# Patient Record
Sex: MALE | Race: WHITE | NOT HISPANIC OR LATINO | Employment: OTHER | URBAN - METROPOLITAN AREA
[De-identification: names, ages, dates, MRNs, and addresses within clinical notes are randomized per-mention and may not be internally consistent; named-entity substitution may affect disease eponyms.]

---

## 2017-04-15 ENCOUNTER — HOSPITAL ENCOUNTER (EMERGENCY)
Facility: CLINIC | Age: 62
Discharge: HOME | End: 2017-04-15
Attending: EMERGENCY MEDICINE | Admitting: SURGERY
Payer: COMMERCIAL

## 2017-04-15 VITALS
SYSTOLIC BLOOD PRESSURE: 145 MMHG | OXYGEN SATURATION: 99 % | RESPIRATION RATE: 16 BRPM | TEMPERATURE: 97.8 F | DIASTOLIC BLOOD PRESSURE: 84 MMHG | HEART RATE: 87 BPM

## 2017-04-15 DIAGNOSIS — S31.139A PUNCTURE WOUND OF ABDOMEN, INITIAL ENCOUNTER: ICD-10-CM

## 2017-04-15 PROCEDURE — 25000128 H RX IP 250 OP 636

## 2017-04-15 PROCEDURE — 20102 EXPL PENTRG WND ABD/FLNK/BK: CPT | Performed by: SURGERY

## 2017-04-15 PROCEDURE — 99284 EMERGENCY DEPT VISIT MOD MDM: CPT | Mod: 25

## 2017-04-15 PROCEDURE — 36000050 ZZH SURGERY LEVEL 2 1ST 30 MIN: Performed by: SURGERY

## 2017-04-15 PROCEDURE — 25000125 ZZHC RX 250: Performed by: SURGERY

## 2017-04-15 PROCEDURE — 71000027 ZZH RECOVERY PHASE 2 EACH 15 MINS: Performed by: SURGERY

## 2017-04-15 PROCEDURE — 27210794 ZZH OR GENERAL SUPPLY STERILE: Performed by: SURGERY

## 2017-04-15 PROCEDURE — 99203 OFFICE O/P NEW LOW 30 MIN: CPT | Mod: 57 | Performed by: SURGERY

## 2017-04-15 PROCEDURE — 40000306 ZZH STATISTIC PRE PROC ASSESS II: Performed by: SURGERY

## 2017-04-15 RX ORDER — GLYCINE 1.5 G/100ML
SOLUTION IRRIGATION PRN
Status: DISCONTINUED | OUTPATIENT
Start: 2017-04-15 | End: 2017-04-15 | Stop reason: HOSPADM

## 2017-04-15 RX ORDER — OXYCODONE HYDROCHLORIDE 5 MG/1
5-10 TABLET ORAL
Qty: 20 TABLET | Refills: 0 | Status: SHIPPED | OUTPATIENT
Start: 2017-04-15 | End: 2018-02-14

## 2017-04-15 RX ORDER — OXYCODONE HYDROCHLORIDE 5 MG/1
5-10 TABLET ORAL
Status: DISCONTINUED | OUTPATIENT
Start: 2017-04-15 | End: 2017-04-15 | Stop reason: HOSPADM

## 2017-04-15 RX ORDER — CEFAZOLIN SODIUM 1 G/3ML
INJECTION, POWDER, FOR SOLUTION INTRAMUSCULAR; INTRAVENOUS
Status: COMPLETED
Start: 2017-04-15 | End: 2017-04-15

## 2017-04-15 RX ORDER — BUPIVACAINE HYDROCHLORIDE 5 MG/ML
INJECTION, SOLUTION PERINEURAL
Status: DISCONTINUED
Start: 2017-04-15 | End: 2017-04-15 | Stop reason: HOSPADM

## 2017-04-15 RX ADMIN — CEFAZOLIN SODIUM 1 G: 1 INJECTION, POWDER, FOR SOLUTION INTRAMUSCULAR; INTRAVENOUS at 15:03

## 2017-04-15 ASSESSMENT — ENCOUNTER SYMPTOMS: WOUND: 1
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
## 2017-04-15 NOTE — IP AVS SNAPSHOT
[facility] PreOP/PostOP    [address]    [facility] [address]    Phone:  [phone]    Fax:  [phone]                                       After Visit Summary   4/15/2017    Linda Grover    MRN: [id number]           After Visit Summary Signature Page     I have received my discharge instructions, and my questions have been answered. I have discussed any challenges I see with this plan with the nurse or doctor.    ..........................................................................................................................................  Patient/Patient Representative Signature      ..........................................................................................................................................  Patient Representative Print Name and Relationship to Patient    ..................................................               ................................................  Date                                            Time    ..........................................................................................................................................  Reviewed by Signature/Title    ...................................................              ..............................................  Date                                                            Time

## 2017-04-15 NOTE — IP AVS SNAPSHOT
"                  MRN:[id number]                      After Visit Summary   4/15/2017    Linda Grover    MRN: [id number]           Thank you!     Thank you for choosing [facility] for your care. Our goal is always to provide you with excellent care. Hearing back from our patients is one way we can continue to improve our services. Please take a few minutes to complete the written survey that you may receive in the mail after you visit. If you would like to speak to someone directly about your visit please contact Patient Relations at [phone]. Thank you!          Patient Information     Date Of Birth          1955        About your hospital stay     You were admitted on:  N/A You last received care in the:  [facility] PreOP/PostOP    You were discharged on:  April 15, 2017       Who to Call     For medical emergencies, please call 911.  For non-urgent questions about your medical care, please call your primary care provider or clinic, [phone]  For questions related to your surgery, please call your surgery clinic        Attending Provider     Provider Matthew Bhandari MD Emergency Medicine       Primary Care Provider Office Phone # Fax #    Marialuisa Beavers [phone]       [facility] [address] [facility] [place] [address]        After Care Instructions     Patient care order       Dressing supplies - 4x4 gauze, one stack                                   2\" medipore tape one roll                  Further instructions from your care team       HOME CARE FOLLOWING MINOR SURGERY  FARZANA Razo E. Gavin, N. Guttormson, D. Maurer, R. O Donnell, L. Thomas    RESULTS:  If a biopsy of tissue was done, you may call for your final pathology report after 1p.m. two working days after surgery.  Your results will also be reviewed with you at your postoperative appointment.    INCISIONAL CARE:    If you have a dressing in place, " keep clean and dry for 24 hours; you may replace the gauze if it becomes soiled.    Sutures which are beneath the skin will absorb and do not need to be removed.    Sutures you can see should be removed at your surgeon's office in 10-14 days at the latest.    You may expect a small amount of drainage from your incision.    A lump/ridge under the incision is normal and will gradually resolve.  If it becomes red or very uncomfortable, contact the nurse at your surgeon's office to discuss whether this needs to be evaluated.    ACTIVITY:  Cautiously resume exercise and strenuous activities such as jogging, tennis, aerobics, etc. Also, be careful of stretching activities which affect the area of surgery for two weeks.    DIET:  No restrictions.  Increased fluid intake is recommended. While taking pain medications, increase dietary fiber or add a fiber supplementation like Metamucil or Citrucel to help prevent constipation - a possible side effect of pain medications.    DISCOMFORT:  Local anesthetic placed at surgery should provide relief for 4-8 hours.  Begin taking pain pills before discomfort is severe.  Take the pain medication with some food, when possible, to minimize side effects.  Intermittent use of ice packs may help during the first 48 hours.  Expect gradual improvement.  You may slowly transition to use of over-the-counter medications for pain relief when you are no longer requiring narcotics for pain control.    RETURN APPOINTMENT:  Schedule a follow-up visit 2-3 weeks post-op.  Office Phone:  [phone]     CONTACT US IF THE FOLLOWING DEVELOPS:   1. A fever that is above 101     2. If there is a large amount of drainage, bleeding, or swelling.   3. Severe pain that is not relieved by your prescription.   4. Drainage that is thick, cloudy, yellow, green or white.   5. Any other questions not answered by  Frequently Asked Questions  sheet.      FREQUENTLY ASKED QUESTIONS:    Q:  How should my incision look?     A:  Normally your incision will appear slightly swollen with light redness directly along the incision itself as it heals.  It may feel like a bump or ridge as the healing/scarring happens, and over time (3-4 months) this bump or ridge feeling should slowly go away.  In general, clear or pink watery drainage can be normal at first as your incision heals, but should decrease over time.    Q:  How do I know if my incision is infected?  A:  Look at your incision for signs of infection, like redness around the incision spreading to surrounding skin, or drainage of cloudy or foul-smelling drainage.  If you feel warm, check your temperature to see if you are running a fever.    **If any of these things occur, please notify the nurse at our office.  We may need you to come into the office for an incision check.      Q:  How do I take care of my incision?  A:  If you have a dressing in place - Starting the day after surgery, replace the dressing 1-2 times a day until there is no further drainage from the incision.  At that time, a dressing is no longer needed.  Try to minimize tape on the skin if irritation is occurring at the tape sites.  If you have significant irritation from tape on the skin, please call the office to discuss other method of dressing your incision.      Q:  There is a piece of tape or a sticky  lead  still on my skin.  Can I remove this?  A:  Sometimes the sticky  leads  used for monitoring during surgery or for evaluation in the emergency department are not all removed while you are in the hospital.  These sometimes have a tab or metal dot on them.  You can easily remove these on your own, like taking off a band-aid.  If there is a gel substance under the  lead , simply wipe/clean it off with a washcloth or paper towel.      Q:  What can I do to minimize constipation (very hard stools, or lack of stools)?  A:  Stay well hydrated.  Increase your dietary fiber intake or take a fiber supplement -with  plenty of water.  Walk around frequently.  You may consider an over-the-counter stool-softener.  Your Pharmacist can assist you with choosing one that is stocked at your pharmacy.  Constipation is also one of the most common side effects of pain medication.  If you are using pain medication, be pro-active and try to PREVENT problems with constipation by taking the steps above BEFORE constipation becomes a problem.    Q:  What do I do if I need more pain medications?  A:  Call the office to receive refills.  Be aware that certain pain meds cannot be called into a pharmacy and actually require a paper prescription.  A change may be made in your pain med as you progress thru your recovery period or if you have side effects to certain meds.    --Pain meds are NOT refilled after 5pm on weekdays, and NOT AT ALL on the weekends, so please look ahead to prevent problems.      Q:  Why am I having a hard time sleeping now that I am at home?  A:  Many medications you receive while you are in the hospital can impact your sleep for a number of days after your surgery/hospitalization.  Decreased level of activity and naps during the day may also make sleeping at night difficult.  Try to minimize day-time naps, and get up frequently during the day to walk around your home during your recovery time.  Sleep aides may be of some help, but are not recommended for long-term use.      Q:  I am having some back discomfort.  What should I do?  A:  This may be related to certain positioning that was required for your surgery, extended periods of time in bed, or other changes in your overall activity level.  You may try ice, heat, acetaminophen, or ibuprofen to treat this temporarily.  Note that many pain medications have acetaminophen in them and would state this on the prescription bottle.  Be sure not to exceed the maximum of 4000mg per day of acetaminophen.     **If the pain you are having does not resolve, is severe, or is a flare of  back pain you have had on other occasions prior to surgery, please contact your primary physician for further recommendations or for an appointment to be examined at their office.    Q:  Why am I having headaches?  A:  Headaches can be caused by many things:  caffeine withdrawal, use of pain meds, dehydration, high blood pressure, lack of sleep, over-activity/exhaustion, flare-up of usual migraine headaches.  If you feel this is related to muscle tension (a band-like feeling around the head, or a pressure at the low-back of the head) you may try ice or heat to this area.  You may need to drink more fluids (try electrolyte drink like Gatorade), rest, or take your usual migraine medications.   **If your headaches do not resolve, worsen, are accompanied by other symptoms, or if your blood pressure is high, please call your primary physician for recommendation and/or examination.    Q:  I am unable to urinate.  What do I do?  A:  A small percentage of people can have difficulty urinating initially after surgery.  This includes being able to urinate only a very small amount at a time and feeling discomfort or pressure in the very low abdomen.  This is called  urinary retention , and is actually an urgent situation.  Proceed to your nearest Emergency department for evaluation (not an Urgent Care Center).  Sometimes the bladder does not work correctly after certain medications you receive during surgery, or related to certain procedures.  You may need to have a catheter placed until your bladder recovers.  When planning to go to an Emergency department, it may help to call the ER to let them know you are coming in for this problem after a surgery.  This may help you get in quicker to be evaluated.  **If you have symptoms of a urinary tract infection, please contact your primary physician for the proper evaluation and treatment.          If you have other questions, please call the office Monday thru Friday between 8am and  "5pm to discuss with the nurse or physician assistant.  [phone]    There is a surgeon ON CALL on weekday evenings and over the weekend in case of urgent need only, and may be contacted at the same number.    If you are having an emergency, call 911 or proceed to your nearest emergency department.        Pending Results     No orders found from 2017 to 2017.            Admission Information     Date & Time Department Dept. Phone    4/15/2017 [facility] PreOP/PostOP [phone]      Your Vitals Were     Blood Pressure Pulse Temperature Respirations Pulse Oximetry       152/94 87 97.8  F (36.6  C) (Temporal) 16 98%       MyChart Information     [employer] lets you send messages to your doctor, view your test results, renew your prescriptions, schedule appointments and more. To sign up, go to [email or web address] . Click on \"Log in\" on the left side of the screen, which will take you to the Welcome page. Then click on \"Sign up Now\" on the right side of the page.     You will be asked to enter the access code listed below, as well as some personal information. Please follow the directions to create your username and password.     Your access code is: [email or web address]  Expires: 2017  4:38 PM     Your access code will  in 90 days. If you need help or a new code, please call your [place] clinic or [phone].        Care EveryWhere ID     This is your Care EveryWhere ID. This could be used by other organizations to access your [place] medical records  [id number]           Review of your medicines      START taking        Dose / Directions    oxyCODONE 5 MG IR tablet   Commonly known as:  ROXICODONE   Used for:  Puncture wound of abdomen, initial encounter        Dose:  5-10 mg   Take 1-2 tablets (5-10 mg) by mouth every 3 hours as needed for pain or other (Moderate to Severe)   Quantity:  20 tablet   Refills:  0         CONTINUE these medicines which have NOT CHANGED        Dose / " Directions    BACLOFEN PO        Refills:  0       LISINOPRIL PO        Refills:  0       TYSABRI IV        Refills:  0            Where to get your medicines      Some of these will need a paper prescription and others can be bought over the counter. Ask your nurse if you have questions.     Bring a paper prescription for each of these medications     oxyCODONE 5 MG IR tablet                Protect others around you: Learn how to safely use, store and throw away your medicines at www.disposemymeds.org.             Medication List: This is a list of all your medications and when to take them. Check marks below indicate your daily home schedule. Keep this list as a reference.      Medications           Morning Afternoon Evening Bedtime As Needed    BACLOFEN PO                                LISINOPRIL PO                                oxyCODONE 5 MG IR tablet   Commonly known as:  ROXICODONE   Take 1-2 tablets (5-10 mg) by mouth every 3 hours as needed for pain or other (Moderate to Severe)                                TYSABRI IV

## 2017-04-15 NOTE — DISCHARGE INSTRUCTIONS
HOME CARE FOLLOWING MINOR SURGERY  FARZANA Razo E. Gavin, N. Guttormson, D. Maurer, LIBBY Pappas    RESULTS:  If a biopsy of tissue was done, you may call for your final pathology report after 1p.m. two working days after surgery.  Your results will also be reviewed with you at your postoperative appointment.    INCISIONAL CARE:    If you have a dressing in place, keep clean and dry for 24 hours; you may replace the gauze if it becomes soiled.    Sutures which are beneath the skin will absorb and do not need to be removed.    Sutures you can see should be removed at your surgeon's office in 10-14 days at the latest.    You may expect a small amount of drainage from your incision.    A lump/ridge under the incision is normal and will gradually resolve.  If it becomes red or very uncomfortable, contact the nurse at your surgeon's office to discuss whether this needs to be evaluated.    ACTIVITY:  Cautiously resume exercise and strenuous activities such as jogging, tennis, aerobics, etc. Also, be careful of stretching activities which affect the area of surgery for two weeks.    DIET:  No restrictions.  Increased fluid intake is recommended. While taking pain medications, increase dietary fiber or add a fiber supplementation like Metamucil or Citrucel to help prevent constipation - a possible side effect of pain medications.    DISCOMFORT:  Local anesthetic placed at surgery should provide relief for 4-8 hours.  Begin taking pain pills before discomfort is severe.  Take the pain medication with some food, when possible, to minimize side effects.  Intermittent use of ice packs may help during the first 48 hours.  Expect gradual improvement.  You may slowly transition to use of over-the-counter medications for pain relief when you are no longer requiring narcotics for pain control.    RETURN APPOINTMENT:  Schedule a follow-up visit 2-3 weeks post-op.  Office Phone:  [phone]     CONTACT US  IF THE FOLLOWING DEVELOPS:   1. A fever that is above 101     2. If there is a large amount of drainage, bleeding, or swelling.   3. Severe pain that is not relieved by your prescription.   4. Drainage that is thick, cloudy, yellow, green or white.   5. Any other questions not answered by  Frequently Asked Questions  sheet.      FREQUENTLY ASKED QUESTIONS:    Q:  How should my incision look?    A:  Normally your incision will appear slightly swollen with light redness directly along the incision itself as it heals.  It may feel like a bump or ridge as the healing/scarring happens, and over time (3-4 months) this bump or ridge feeling should slowly go away.  In general, clear or pink watery drainage can be normal at first as your incision heals, but should decrease over time.    Q:  How do I know if my incision is infected?  A:  Look at your incision for signs of infection, like redness around the incision spreading to surrounding skin, or drainage of cloudy or foul-smelling drainage.  If you feel warm, check your temperature to see if you are running a fever.    **If any of these things occur, please notify the nurse at our office.  We may need you to come into the office for an incision check.      Q:  How do I take care of my incision?  A:  If you have a dressing in place - Starting the day after surgery, replace the dressing 1-2 times a day until there is no further drainage from the incision.  At that time, a dressing is no longer needed.  Try to minimize tape on the skin if irritation is occurring at the tape sites.  If you have significant irritation from tape on the skin, please call the office to discuss other method of dressing your incision.      Q:  There is a piece of tape or a sticky  lead  still on my skin.  Can I remove this?  A:  Sometimes the sticky  leads  used for monitoring during surgery or for evaluation in the emergency department are not all removed while you are in the hospital.  These  sometimes have a tab or metal dot on them.  You can easily remove these on your own, like taking off a band-aid.  If there is a gel substance under the  lead , simply wipe/clean it off with a washcloth or paper towel.      Q:  What can I do to minimize constipation (very hard stools, or lack of stools)?  A:  Stay well hydrated.  Increase your dietary fiber intake or take a fiber supplement -with plenty of water.  Walk around frequently.  You may consider an over-the-counter stool-softener.  Your Pharmacist can assist you with choosing one that is stocked at your pharmacy.  Constipation is also one of the most common side effects of pain medication.  If you are using pain medication, be pro-active and try to PREVENT problems with constipation by taking the steps above BEFORE constipation becomes a problem.    Q:  What do I do if I need more pain medications?  A:  Call the office to receive refills.  Be aware that certain pain meds cannot be called into a pharmacy and actually require a paper prescription.  A change may be made in your pain med as you progress thru your recovery period or if you have side effects to certain meds.    --Pain meds are NOT refilled after 5pm on weekdays, and NOT AT ALL on the weekends, so please look ahead to prevent problems.      Q:  Why am I having a hard time sleeping now that I am at home?  A:  Many medications you receive while you are in the hospital can impact your sleep for a number of days after your surgery/hospitalization.  Decreased level of activity and naps during the day may also make sleeping at night difficult.  Try to minimize day-time naps, and get up frequently during the day to walk around your home during your recovery time.  Sleep aides may be of some help, but are not recommended for long-term use.      Q:  I am having some back discomfort.  What should I do?  A:  This may be related to certain positioning that was required for your surgery, extended periods of  time in bed, or other changes in your overall activity level.  You may try ice, heat, acetaminophen, or ibuprofen to treat this temporarily.  Note that many pain medications have acetaminophen in them and would state this on the prescription bottle.  Be sure not to exceed the maximum of 4000mg per day of acetaminophen.     **If the pain you are having does not resolve, is severe, or is a flare of back pain you have had on other occasions prior to surgery, please contact your primary physician for further recommendations or for an appointment to be examined at their office.    Q:  Why am I having headaches?  A:  Headaches can be caused by many things:  caffeine withdrawal, use of pain meds, dehydration, high blood pressure, lack of sleep, over-activity/exhaustion, flare-up of usual migraine headaches.  If you feel this is related to muscle tension (a band-like feeling around the head, or a pressure at the low-back of the head) you may try ice or heat to this area.  You may need to drink more fluids (try electrolyte drink like Gatorade), rest, or take your usual migraine medications.   **If your headaches do not resolve, worsen, are accompanied by other symptoms, or if your blood pressure is high, please call your primary physician for recommendation and/or examination.    Q:  I am unable to urinate.  What do I do?  A:  A small percentage of people can have difficulty urinating initially after surgery.  This includes being able to urinate only a very small amount at a time and feeling discomfort or pressure in the very low abdomen.  This is called  urinary retention , and is actually an urgent situation.  Proceed to your nearest Emergency department for evaluation (not an Urgent Care Center).  Sometimes the bladder does not work correctly after certain medications you receive during surgery, or related to certain procedures.  You may need to have a catheter placed until your bladder recovers.  When planning to go to  an Emergency department, it may help to call the ER to let them know you are coming in for this problem after a surgery.  This may help you get in quicker to be evaluated.  **If you have symptoms of a urinary tract infection, please contact your primary physician for the proper evaluation and treatment.          If you have other questions, please call the office Monday thru Friday between 8am and 5pm to discuss with the nurse or physician assistant.  #[phone]    There is a surgeon ON CALL on weekday evenings and over the weekend in case of urgent need only, and may be contacted at the same number.    If you are having an emergency, call 911 or proceed to your nearest emergency department.

## 2017-04-16 NOTE — OP NOTE
DATE OF PROCEDURE:  04/15/2017      PREOPERATIVE DIAGNOSIS:  Stab wound, left upper abdomen.      POSTOPERATIVE DIAGNOSIS:  Stab wound, left upper abdomen, no penetration of fascia.      PROCEDURE:  Wound exploration, irrigation and partial closure.      SURGEON:  Ever Keane MD      ANESTHETIC:  Local.      BLOOD LOSS:  Minimal.      INDICATION:  Linda Grover is a 61-year-old male who was cutting a piece of garden irrigation tubing with a utility knife when he slipped and accidentally stabbed himself in the abdomen.  The knife went through a shirt.  The triangular blade was less than an inch in length by his report.  No portion of the plate appeared to be missing after the accident.  He did note significant initial bleeding which stopped on its own with local pressure.  He has no other symptoms and no other injuries.  Local exploration in the emergency room was inadequate to visualize the base of the wound.  He is now therefore brought to the operating room for improved retraction, exposure and lighting to explore the wound and proceed with laparotomy if necessary.  He seems to understand all these issues quite well.  All his questions have been answered and he is ready to proceed.      DESCRIPTION OF PROCEDURE:  The patient was brought to the operating room, placed supine on the table and the central abdomen was prepped and draped in sterile manner.  A pause was performed.  The previous wound was further anesthetized with local anesthetic.  We then explored the wound.  It travels tangentially caudally making visualization of the very base difficult.  We therefore extended the incision by about 1 cm and this allowed full visualization of the base of the wound.  There is no fascial penetration.  The wound is irrigated with IrriSept and rinsed.  We carefully checked for any debris or shirt fibers and none were seen.  After irrigating and rinsing, we loosely coapted the skin to allow for drainage between the  sutures, but to also secure the skin flap in place so it would heal in an appropriate position.  Once the sutures were placed, and dressing was applied and he was returned to the recovery room in excellent condition with all sponge and needle counts correct, having tolerated the procedure very well.         BOSTON CHATTERJEE MD             D: 04/15/2017 16:38   T: 04/15/2017 22:18   MT: [id number]      Name:     SAMPSON OROZCO   MRN:      [id number]        Account:        [id number]   :      1955           Procedure Date: 04/15/2017      Document: [id number]       cc: Marialuisa Beavers MD

## 2017-04-24 ENCOUNTER — HOSPITAL ENCOUNTER (EMERGENCY)
Facility: CLINIC | Age: 62
Discharge: HOME | End: 2017-04-24
Attending: NURSE PRACTITIONER | Admitting: NURSE PRACTITIONER
Payer: COMMERCIAL

## 2017-04-24 VITALS
DIASTOLIC BLOOD PRESSURE: 99 MMHG | SYSTOLIC BLOOD PRESSURE: 172 MMHG | OXYGEN SATURATION: 96 % | HEART RATE: 93 BPM | RESPIRATION RATE: 18 BRPM | TEMPERATURE: 98.1 F

## 2017-04-24 DIAGNOSIS — L25.9 CONTACT DERMATITIS, UNSPECIFIED CONTACT DERMATITIS TYPE, UNSPECIFIED TRIGGER: ICD-10-CM

## 2017-04-24 DIAGNOSIS — Z51.89 VISIT FOR WOUND CHECK: ICD-10-CM

## 2017-04-24 PROCEDURE — 99282 EMERGENCY DEPT VISIT SF MDM: CPT

## 2017-04-24 ASSESSMENT — ENCOUNTER SYMPTOMS
VOMITING: 0
COLOR CHANGE: 0
FEVER: 0
NAUSEA: 0
WOUND: 1

## 2017-04-24 NOTE — ED AVS SNAPSHOT
[facility] Emergency Department    [address]    [facility] [address]    Phone:  [phone]    Fax:  [phone]                                       Linda Grover   MRN: [id number]    Department:  [facility] Emergency Department   Date of Visit:  4/24/2017           After Visit Summary Signature Page     I have received my discharge instructions, and my questions have been answered. I have discussed any challenges I see with this plan with the nurse or doctor.    ..........................................................................................................................................  Patient/Patient Representative Signature      ..........................................................................................................................................  Patient Representative Print Name and Relationship to Patient    ..................................................               ................................................  Date                                            Time    ..........................................................................................................................................  Reviewed by Signature/Title    ...................................................              ..............................................  Date                                                            Time

## 2017-04-24 NOTE — DISCHARGE INSTRUCTIONS
Wound Care    You have a break in the skin. This wound may be because of an injury. Or it may be the result of surgery. Closing the wound helps stop bleeding, protects the wound from infection, and speeds healing. The type of closure that is used depends on the size and location of the wound. Choices include stitches (sutures), strips of surgical tape, skin glue, or staples.  Home care  Your health care provider may prescribe medicines for pain. Or he or she may suggest an over-the-counter (OTC) pain reliever, such as ibuprofen. If you have chronic kidney disease, talk with your provider before taking any OTC medicines. Also talk with your provider if you've had a stomach ulcer or gastrointestinal bleeding. In certain cases, antibiotics may be prescribed to help prevent infection. If antibiotics are prescribed, take them exactly as directed for as long as directed. Do not stop taking your antibiotics until they are all gone, even if you feel better.  General care    Follow the health care provider s instructions on how to care for the wound.    Wash your hands with soap and warm water before and after caring for the wound. This helps prevent infection.    If a bandage was applied, change it once a day or as directed. If at any time the bandage becomes wet or dirty, replace it with a new one.    Unless told otherwise, avoid soaking the wound in water. Take showers or sponge baths instead of tub baths. Don't scrub or pick at the wound.    Don't go swimming.    If you have a bandage and it gets wet, use a clean cloth to gently pat the wound dry. Then replace the bandage with a dry one.    Don't scratch, rub, or pick at the area.    Watch for the signs of infection listed below. Any wound can get infected, even if you are taking antibiotics. Seek care right away if you see any possible signs of infection.  Care for specific closures    Sutures. You may want to clean the wound daily after the first 2 to 3 days. To do  this, remove the bandage and gently wash the area with soap and warm water. After cleaning, apply a thin layer of antibiotic ointment if recommended. Then apply a new bandage. Sutures on the outside of the skin usually need to be removed by your health care provider.    Surgical tape. Keep the area dry. If it gets wet, blot it dry with a towel. Surgical tape closures usually fall off within 7 to 10 days. If they have not fallen off after 10 days, you can remove them yourself. To remove the tape, use mineral oil or petroleum jelly on a cotton ball to gently rub the adhesive.    Skin glue. You may shower or bathe as usual, but do not use soaps, lotions, or ointments on the wound area. Do not scrub the wound. After bathing, pat the wound dry with a soft towel. Do not apply liquids like peroxide, ointments, or creams to the wound while the strips or film is in place. Don't scratch, rub, or pick at the strips or film. Don't put tape directly over the strips or film. Skin adhesive film will fall off naturally in 5 to 10 days. If it does not peel off in 10 days, gently rub petroleum jelly or an ointment onto the film.    Jarek. Take showers or sponge baths. Don't take tub baths. Don't use lotions on the wound area. The area may be cleaned with soap and water 2 to 3 days after the wound was stapled. Don't scrub the wound. Pat it dry with a clean soft cloth or towel. You can use antibiotic ointment if your provider tells you to. Staples will need to be removed by your health care provider in 10 to 14 days.  Follow-up care  Follow up with your health care provider, or as directed. If you have sutures or staples, return for their removal as directed.  When to seek medical advice  Call your health care provider right away if you have signs of infection:    Fever of 100.4 F (38 C) or higher, or as directed by your health care provider    Increasing pain in the wound    Increasing redness or swelling    Pus or bad-smelling  drainage from the wound  Also call your provider right away if any of these occur:    Wound bleeds more than a small amount or won t stop bleeding    Wound edges come apart    Numbness or weakness in the wound area that doesn t go away    0927-5253 [employer]. [address] [place] [id number]. All rights reserved. This information is not intended as a substitute for professional medical care. Always follow your healthcare professional's instructions.

## 2017-04-24 NOTE — ED NOTES
Presents to the ED for suture removal of laceration to abdomen. Reports has had some redness around site and a possible tape reaction.

## 2017-04-24 NOTE — ED PROVIDER NOTES
History     Chief Complaint:  Wound Check and Suture Removal       HPI   Linda Grover is a 61 year old male who presents for a wound check and requesting suture removal. On 4/15/2017, the patient was seen in the ED for a puncture wound to his abdomen that he sustained after accidentally stabbing himself with a  that required surgical exploration and closure. He was advised to return in 10 days to have the sutures removed. Since then, the patient reports that the wound has been healing well and he has not had any fever, nausea, vomiting, or spreading redness around the wound. He presents to the ED due to concern that he will require suture removal.     Allergies:  NKDA     Medications:    Natalizumab (TYSABRI IV)  oxyCODONE (ROXICODONE) 5 MG IR tablet  BACLOFEN PO  LISINOPRIL PO    Past Medical History:    Hypertension  Multiple sclerosis     Past Surgical History:    Close secondary wound abdomen     Family History:    History reviewed. No pertinent family history.     Social History:  Tobacco use:    Former smoker  Alcohol use:    Positive  Marital status:    Marred  Accompanied to ED by:  Alone      Review of Systems   Constitutional: Negative for fever.   Gastrointestinal: Negative for nausea and vomiting.   Skin: Positive for wound (healing wound, abdomen). Negative for color change.   All other systems reviewed and are negative.    Physical Exam   First Vitals:  BP: (!) 172/99  Pulse: 93  Temp: 98.1  F (36.7  C)  Resp: 18  SpO2: 96 %      Physical Exam  General: Alert, No obvious discomfort, well kept  Eyes: PERRL, conjunctivae pink no scleral icterus or conjunctival injection  ENT:   Moist mucus membranes, posterior oropharynx clear without erythema or exudates, No lymphadenopathy, Normal voice  Resp:  Lungs clear to auscultation bilaterally, no crackles/rubs/wheezes. Good air movement  CV:  Normal rate and rhythm, no murmurs/rubs/gallops  GI:  Abdomen soft and non-distended.  Normoactive BS.  No  tenderness, guarding or rebound, No masses  Skin:  Warm, dry.  J-shaped laceration upper middle abdomen with some contact irritation where tape was placed and near wound where the patient was applying neosporin. Non-tender. Appears to be healing well. Small separation of wound edges along the top edge of wound. No indication for abscess. No streaking.   Musculoskeletal: No peripheral edema or calf tenderness, Normal gross ROM   Neuro: Alert and oriented to person/place/time, normal sensation  Psychiatric: Normal affect, cooperative, good eye contact    Emergency Department Course     Emergency Department Course:  Nursing notes and vitals reviewed.  1637: I performed an exam of the patient as documented above.     Findings and plan explained to the Patient. Patient discharged home with instructions regarding supportive care, medications, and reasons to return. The importance of close follow-up was reviewed.     Impression & Plan      Medical Decision Making:  Linda Grover is a 61 year old male who presents for evaluation of a wound with possible need for suture removal. He had a laceration that was repaired previously after he slipped while cutting open some packages. This wound does appear to be healing as expected. However, he does have some contact dermatitis from where tape was applied as well as the application of neosporin. There is no indication for laboratory studies at this time. It appears to be healing well. He is safe and appropriate for outpatient management and follow up. If he develops any further concerns, he will follow up with his primary care provider.     Diagnosis:    ICD-10-CM   1. Visit for wound check Z51.89   2. Contact dermatitis, unspecified contact dermatitis type, unspecified trigger L25.9       Disposition:  Discharged to home.       I, Doc Shaikh, am serving as a scribe at 4:37 PM on 4/24/2017 to document services personally performed by Matthew Taylor NP, based on my observations and  the provider's statements to me.     [facility] EMERGENCY DEPARTMENT       Matthew Taylor, JUAN DIEGO CNP  04/24/17 0775

## 2017-04-24 NOTE — ED AVS SNAPSHOT
[facility]    [address]    [facility] [address]    Phone:  [phone]    Fax:  [phone]                                       Linda Grover   MRN: [id number]    Department:  [facility]   Date of Visit:  4/24/2017           Patient Information     Date Of Birth          1955        Your diagnoses for this visit were:     Visit for wound check     Contact dermatitis, unspecified contact dermatitis type, unspecified trigger        You were seen by Matthew Taylor APRN CNP.      Follow-up Information     Follow up with Marialuisa Beavers MD In 3 days.    Specialty:  Family Practice    Why:  if concerns about healing    Contact information:    [facility]  [address]   [place] [address]  [phone]          Follow up with [facility].    Specialty:  EMERGENCY MEDICINE    Why:  if continuned symptoms or sooner if worsening    Contact information:    [address]  [facility] [address] [phone]        Discharge Instructions         Wound Care    You have a break in the skin. This wound may be because of an injury. Or it may be the result of surgery. Closing the wound helps stop bleeding, protects the wound from infection, and speeds healing. The type of closure that is used depends on the size and location of the wound. Choices include stitches (sutures), strips of surgical tape, skin glue, or staples.  Home care  Your health care provider may prescribe medicines for pain. Or he or she may suggest an over-the-counter (OTC) pain reliever, such as ibuprofen. If you have chronic kidney disease, talk with your provider before taking any OTC medicines. Also talk with your provider if you've had a stomach ulcer or gastrointestinal bleeding. In certain cases, antibiotics may be prescribed to help prevent infection. If antibiotics are prescribed, take them  exactly as directed for as long as directed. Do not stop taking your antibiotics until they are all gone, even if you feel better.  General care    Follow the health care provider s instructions on how to care for the wound.    Wash your hands with soap and warm water before and after caring for the wound. This helps prevent infection.    If a bandage was applied, change it once a day or as directed. If at any time the bandage becomes wet or dirty, replace it with a new one.    Unless told otherwise, avoid soaking the wound in water. Take showers or sponge baths instead of tub baths. Don't scrub or pick at the wound.    Don't go swimming.    If you have a bandage and it gets wet, use a clean cloth to gently pat the wound dry. Then replace the bandage with a dry one.    Don't scratch, rub, or pick at the area.    Watch for the signs of infection listed below. Any wound can get infected, even if you are taking antibiotics. Seek care right away if you see any possible signs of infection.  Care for specific closures    Sutures. You may want to clean the wound daily after the first 2 to 3 days. To do this, remove the bandage and gently wash the area with soap and warm water. After cleaning, apply a thin layer of antibiotic ointment if recommended. Then apply a new bandage. Sutures on the outside of the skin usually need to be removed by your health care provider.    Surgical tape. Keep the area dry. If it gets wet, blot it dry with a towel. Surgical tape closures usually fall off within 7 to 10 days. If they have not fallen off after 10 days, you can remove them yourself. To remove the tape, use mineral oil or petroleum jelly on a cotton ball to gently rub the adhesive.    Skin glue. You may shower or bathe as usual, but do not use soaps, lotions, or ointments on the wound area. Do not scrub the wound. After bathing, pat the wound dry with a soft towel. Do not apply liquids like peroxide, ointments, or creams to the  wound while the strips or film is in place. Don't scratch, rub, or pick at the strips or film. Don't put tape directly over the strips or film. Skin adhesive film will fall off naturally in 5 to 10 days. If it does not peel off in 10 days, gently rub petroleum jelly or an ointment onto the film.    [place]. Take showers or sponge baths. Don't take tub baths. Don't use lotions on the wound area. The area may be cleaned with soap and water 2 to 3 days after the wound was stapled. Don't scrub the wound. Pat it dry with a clean soft cloth or towel. You can use antibiotic ointment if your provider tells you to. Staples will need to be removed by your health care provider in 10 to 14 days.  Follow-up care  Follow up with your health care provider, or as directed. If you have sutures or staples, return for their removal as directed.  When to seek medical advice  Call your health care provider right away if you have signs of infection:    Fever of 100.4 F (38 C) or higher, or as directed by your health care provider    Increasing pain in the wound    Increasing redness or swelling    Pus or bad-smelling drainage from the wound  Also call your provider right away if any of these occur:    Wound bleeds more than a small amount or won t stop bleeding    Wound edges come apart    Numbness or weakness in the wound area that doesn t go away    1465-9451 [employer]. [address] [place] [address]. All rights reserved. This information is not intended as a substitute for professional medical care. Always follow your healthcare professional's instructions.          24 Hour Appointment Hotline       To make an appointment at any [facility], call [phone] ([phone]). If you don't have a family doctor or clinic, we will help you find one. [facility] clinics are conveniently located to serve the needs of you and your family.             Review of your medicines      Our records show that you are  taking the medicines listed below. If these are incorrect, please call your family doctor or clinic.        Dose / Directions Last dose taken    BACLOFEN PO        Refills:  0        LISINOPRIL PO        Refills:  0        oxyCODONE 5 MG IR tablet   Commonly known as:  ROXICODONE   Dose:  5-10 mg   Quantity:  20 tablet        Take 1-2 tablets (5-10 mg) by mouth every 3 hours as needed for pain or other (Moderate to Severe)   Refills:  0        TYSABRI IV        Refills:  0                Orders Needing Specimen Collection     None      Pending Results     No orders found from 4/22/2017 to 4/25/2017.            Pending Culture Results     No orders found from 4/22/2017 to 4/25/2017.            Test Results From Your Hospital Stay               Clinical Quality Measure: Blood Pressure Screening     Your blood pressure was checked while you were in the emergency department today. The last reading we obtained was  BP: (!) 172/99 . Please read the guidelines below about what these numbers mean and what you should do about them.  If your systolic blood pressure (the top number) is less than 120 and your diastolic blood pressure (the bottom number) is less than 80, then your blood pressure is normal. There is nothing more that you need to do about it.  If your systolic blood pressure (the top number) is 120-139 or your diastolic blood pressure (the bottom number) is 80-89, your blood pressure may be higher than it should be. You should have your blood pressure rechecked within a year by a primary care provider.  If your systolic blood pressure (the top number) is 140 or greater or your diastolic blood pressure (the bottom number) is 90 or greater, you may have high blood pressure. High blood pressure is treatable, but if left untreated over time it can put you at risk for heart attack, stroke, or kidney failure. You should have your blood pressure rechecked by a primary care provider within the next 4 weeks.  If your  "provider in the emergency department today gave you specific instructions to follow-up with your doctor or provider even sooner than that, you should follow that instruction and not wait for up to 4 weeks for your follow-up visit.        Thank you for choosing [facility]       Thank you for choosing [facility] for your care. Our goal is always to provide you with excellent care. Hearing back from our patients is one way we can continue to improve our services. Please take a few minutes to complete the written survey that you may receive in the mail after you visit with us. Thank you!        [facility] Information     [facility] lets you send messages to your doctor, view your test results, renew your prescriptions, schedule appointments and more. To sign up, go to [email or web address] . Click on \"Log in\" on the left side of the screen, which will take you to the Welcome page. Then click on \"Sign up Now\" on the right side of the page.     You will be asked to enter the access code listed below, as well as some personal information. Please follow the directions to create your username and password.     Your access code is: [email or web address]  Expires: 2017  4:38 PM     Your access code will  in 90 days. If you need help or a new code, please call your [facility] clinic or [phone].        Care EveryWhere ID     This is your Care EveryWhere ID. This could be used by other organizations to access your [facility] medical records  [id number]        After Visit Summary       This is your record. Keep this with you and show to your community pharmacist(s) and doctor(s) at your next visit.                  "

## 2017-07-09 ENCOUNTER — HOSPITAL ENCOUNTER (EMERGENCY)
Facility: CLINIC | Age: 62
Discharge: HOME | End: 2017-07-09
Attending: PHYSICIAN ASSISTANT | Admitting: PHYSICIAN ASSISTANT
Payer: COMMERCIAL

## 2017-07-09 ENCOUNTER — APPOINTMENT (OUTPATIENT)
Dept: CT IMAGING | Facility: CLINIC | Age: 62
End: 2017-07-09
Attending: PHYSICIAN ASSISTANT
Payer: COMMERCIAL

## 2017-07-09 ENCOUNTER — APPOINTMENT (OUTPATIENT)
Dept: GENERAL RADIOLOGY | Facility: CLINIC | Age: 62
End: 2017-07-09
Attending: PHYSICIAN ASSISTANT
Payer: COMMERCIAL

## 2017-07-09 VITALS
TEMPERATURE: 99.1 F | RESPIRATION RATE: 16 BRPM | HEART RATE: 73 BPM | OXYGEN SATURATION: 98 % | SYSTOLIC BLOOD PRESSURE: 107 MMHG | DIASTOLIC BLOOD PRESSURE: 56 MMHG

## 2017-07-09 DIAGNOSIS — R07.89 ATYPICAL CHEST PAIN: ICD-10-CM

## 2017-07-09 DIAGNOSIS — R42 LIGHTHEADEDNESS: ICD-10-CM

## 2017-07-09 LAB
ALBUMIN SERPL-MCNC: 4 G/DL (ref 3.4–5)
ALP SERPL-CCNC: 81 U/L (ref 40–150)
ALT SERPL W P-5'-P-CCNC: 35 U/L (ref 0–70)
ANION GAP SERPL CALCULATED.3IONS-SCNC: 8 MMOL/L (ref 3–14)
AST SERPL W P-5'-P-CCNC: 23 U/L (ref 0–45)
BASOPHILS # BLD AUTO: 0.1 10E9/L (ref 0–0.2)
BASOPHILS NFR BLD AUTO: 1.1 %
BILIRUB SERPL-MCNC: 0.5 MG/DL (ref 0.2–1.3)
BUN SERPL-MCNC: 12 MG/DL (ref 7–30)
CALCIUM SERPL-MCNC: 9 MG/DL (ref 8.5–10.1)
CHLORIDE SERPL-SCNC: 107 MMOL/L (ref 94–109)
CO2 SERPL-SCNC: 26 MMOL/L (ref 20–32)
CREAT SERPL-MCNC: 0.87 MG/DL (ref 0.66–1.25)
D DIMER PPP FEU-MCNC: 0.6 UG/ML FEU (ref 0–0.5)
DIFFERENTIAL METHOD BLD: ABNORMAL
EOSINOPHIL # BLD AUTO: 0.7 10E9/L (ref 0–0.7)
EOSINOPHIL NFR BLD AUTO: 6.3 %
ERYTHROCYTE [DISTWIDTH] IN BLOOD BY AUTOMATED COUNT: 15.2 % (ref 10–15)
GFR SERPL CREATININE-BSD FRML MDRD: 89 ML/MIN/1.7M2
GLUCOSE SERPL-MCNC: 109 MG/DL (ref 70–99)
HCT VFR BLD AUTO: 44.5 % (ref 40–53)
HGB BLD-MCNC: 15.1 G/DL (ref 13.3–17.7)
IMM GRANULOCYTES # BLD: 0.1 10E9/L (ref 0–0.4)
IMM GRANULOCYTES NFR BLD: 0.6 %
LIPASE SERPL-CCNC: 138 U/L (ref 73–393)
LYMPHOCYTES # BLD AUTO: 4.4 10E9/L (ref 0.8–5.3)
LYMPHOCYTES NFR BLD AUTO: 40.5 %
MCH RBC QN AUTO: 30.4 PG (ref 26.5–33)
MCHC RBC AUTO-ENTMCNC: 33.9 G/DL (ref 31.5–36.5)
MCV RBC AUTO: 90 FL (ref 78–100)
MONOCYTES # BLD AUTO: 0.8 10E9/L (ref 0–1.3)
MONOCYTES NFR BLD AUTO: 7 %
NEUTROPHILS # BLD AUTO: 4.8 10E9/L (ref 1.6–8.3)
NEUTROPHILS NFR BLD AUTO: 44.5 %
NRBC # BLD AUTO: 0 10*3/UL
NRBC BLD AUTO-RTO: 0 /100
NT-PROBNP SERPL-MCNC: 61 PG/ML (ref 0–900)
PLATELET # BLD AUTO: 237 10E9/L (ref 150–450)
POTASSIUM SERPL-SCNC: 3.7 MMOL/L (ref 3.4–5.3)
PROT SERPL-MCNC: 7.7 G/DL (ref 6.8–8.8)
RBC # BLD AUTO: 4.97 10E12/L (ref 4.4–5.9)
SODIUM SERPL-SCNC: 141 MMOL/L (ref 133–144)
TROPONIN I SERPL-MCNC: NORMAL UG/L (ref 0–0.04)
WBC # BLD AUTO: 10.8 10E9/L (ref 4–11)

## 2017-07-09 PROCEDURE — 25000128 H RX IP 250 OP 636: Performed by: PHYSICIAN ASSISTANT

## 2017-07-09 PROCEDURE — 83880 ASSAY OF NATRIURETIC PEPTIDE: CPT | Performed by: PHYSICIAN ASSISTANT

## 2017-07-09 PROCEDURE — 25000132 ZZH RX MED GY IP 250 OP 250 PS 637: Performed by: PHYSICIAN ASSISTANT

## 2017-07-09 PROCEDURE — 80053 COMPREHEN METABOLIC PANEL: CPT | Performed by: PHYSICIAN ASSISTANT

## 2017-07-09 PROCEDURE — 83690 ASSAY OF LIPASE: CPT | Performed by: PHYSICIAN ASSISTANT

## 2017-07-09 PROCEDURE — 96374 THER/PROPH/DIAG INJ IV PUSH: CPT | Mod: 59

## 2017-07-09 PROCEDURE — 96361 HYDRATE IV INFUSION ADD-ON: CPT

## 2017-07-09 PROCEDURE — 99285 EMERGENCY DEPT VISIT HI MDM: CPT | Mod: 25

## 2017-07-09 PROCEDURE — 71020 XR CHEST 2 VW: CPT

## 2017-07-09 PROCEDURE — 84484 ASSAY OF TROPONIN QUANT: CPT | Performed by: PHYSICIAN ASSISTANT

## 2017-07-09 PROCEDURE — 71260 CT THORAX DX C+: CPT

## 2017-07-09 PROCEDURE — 85379 FIBRIN DEGRADATION QUANT: CPT | Performed by: PHYSICIAN ASSISTANT

## 2017-07-09 PROCEDURE — 93005 ELECTROCARDIOGRAM TRACING: CPT

## 2017-07-09 PROCEDURE — 85025 COMPLETE CBC W/AUTO DIFF WBC: CPT | Performed by: PHYSICIAN ASSISTANT

## 2017-07-09 RX ORDER — SODIUM CHLORIDE 9 MG/ML
1000 INJECTION, SOLUTION INTRAVENOUS CONTINUOUS
Status: DISCONTINUED | OUTPATIENT
Start: 2017-07-09 | End: 2017-07-09 | Stop reason: HOSPADM

## 2017-07-09 RX ORDER — NITROGLYCERIN 0.4 MG/1
0.4 TABLET SUBLINGUAL EVERY 5 MIN PRN
Status: DISCONTINUED | OUTPATIENT
Start: 2017-07-09 | End: 2017-07-09 | Stop reason: HOSPADM

## 2017-07-09 RX ORDER — KETOROLAC TROMETHAMINE 30 MG/ML
30 INJECTION, SOLUTION INTRAMUSCULAR; INTRAVENOUS ONCE
Status: COMPLETED | OUTPATIENT
Start: 2017-07-09 | End: 2017-07-09

## 2017-07-09 RX ORDER — ACETAMINOPHEN 325 MG/1
650 TABLET ORAL EVERY 4 HOURS PRN
Status: DISCONTINUED | OUTPATIENT
Start: 2017-07-09 | End: 2017-07-09

## 2017-07-09 RX ORDER — IOPAMIDOL 755 MG/ML
500 INJECTION, SOLUTION INTRAVASCULAR ONCE
Status: COMPLETED | OUTPATIENT
Start: 2017-07-09 | End: 2017-07-09

## 2017-07-09 RX ADMIN — SODIUM CHLORIDE 90 ML: 9 INJECTION, SOLUTION INTRAVENOUS at 11:50

## 2017-07-09 RX ADMIN — IOPAMIDOL 79 ML: 755 INJECTION, SOLUTION INTRAVENOUS at 11:50

## 2017-07-09 RX ADMIN — KETOROLAC TROMETHAMINE 30 MG: 30 INJECTION, SOLUTION INTRAMUSCULAR at 12:56

## 2017-07-09 RX ADMIN — NITROGLYCERIN 0.4 MG: 0.4 TABLET SUBLINGUAL at 12:21

## 2017-07-09 RX ADMIN — SODIUM CHLORIDE 1000 ML: 9 INJECTION, SOLUTION INTRAVENOUS at 10:41

## 2017-07-09 RX ADMIN — NITROGLYCERIN 0.4 MG: 0.4 TABLET SUBLINGUAL at 12:14

## 2017-07-09 ASSESSMENT — ENCOUNTER SYMPTOMS
LIGHT-HEADEDNESS: 1
FEVER: 0
SHORTNESS OF BREATH: 0
VOMITING: 0
ABDOMINAL PAIN: 0
NAUSEA: 0

## 2017-07-09 NOTE — ED AVS SNAPSHOT
[facility]    [address]    [facility] [address]    Phone:  [phone]    Fax:  [phone]                                       Linda Grover   MRN: [id number]    Department:  [facility]   Date of Visit:  7/9/2017           After Visit Summary Signature Page     I have received my discharge instructions, and my questions have been answered. I have discussed any challenges I see with this plan with the nurse or doctor.    ..........................................................................................................................................  Patient/Patient Representative Signature      ..........................................................................................................................................  Patient Representative Print Name and Relationship to Patient    ..................................................               ................................................  Date                                            Time    ..........................................................................................................................................  Reviewed by Signature/Title    ...................................................              ..............................................  Date                                                            Time

## 2017-07-09 NOTE — ED AVS SNAPSHOT
[facility] Emergency Department    [address]    [facility] [address]    Phone:  [phone]    Fax:  [phone]                                       Linda Grover   MRN: [id number]    Department:  [facility] Emergency Department   Date of Visit:  7/9/2017           Patient Information     Date Of Birth          1955        Your diagnoses for this visit were:     Lightheadedness     Atypical chest pain        You were seen by Lyn Stewart PA-C.      Follow-up Information     Follow up with [facility] Emergency Department.    Specialty:  EMERGENCY MEDICINE    Why:  If symptoms worsen    Contact information:    [address]  [facility] [address] [phone]        Follow up with Marialuisa Beavers MD In 2 days.    Specialty:  Family Practice    Contact information:    [facility]  [address] [place] [address]  [phone]          Discharge Instructions       Rest, fluids, follow up with primary care in 2 days for recheck.   Return for worsening chest tightness/pain, difficulty breathing, passing out, high fevers or any other new concerns.     Discharge Instructions  Chest Pain    You have been seen today for chest pain or discomfort.  At this time, your doctor has found no signs that your chest pain is due to a serious or life-threatening condition, (or you have declined more testing and/or admission to the hospital). However, sometimes there is a serious problem that does not show up right away. Your evaluation today may not be complete and you may need further testing and evaluation.     You need to follow-up with your regular doctor within 3 days.    Return to the Emergency Department if:    Your chest pain changes, gets worse, starts to happen more often, or comes with less activity.    You are short of breath.    You get very weak or tired.    You pass out or faint.    You have any new  symptoms, like fever, cough, numb legs, or you cough up blood.    You have anything else that worries you.    Until you follow-up with your regular doctor please do the following:    Take one aspirin daily unless you have an allergy or are told not to by your doctor.    If a stress test appointment has been made, go to the appointment.    If you have questions, contact your regular doctor.    If your doctor today has told you to follow-up with your regular doctor, it is very important that you make an appointment with your clinic and go to the appointment.  If you do not follow-up with your primary doctor, it may result in missing an important development which could result in permanent injury or disability and/or lasting pain.  If there is any problem keeping your appointment, call your doctor or return to the Emergency Department.    If you were given a prescription for medicine here today, be sure to read all of the information (including the package insert) that comes with your prescription.  This will include important information about the medicine, its side effects, and any warnings that you need to know about.  The pharmacist who fills the prescription can provide more information and answer questions you may have about the medicine.  If you have questions or concerns that the pharmacist cannot address, please call or return to the Emergency Department.     Remember that you can always come back to the Emergency Department if you are not able to see your regular doctor in the amount of time listed above, if you get any new symptoms, or if there is anything that worries you.          24 Hour Appointment Hotline       To make an appointment at any [facility], call [phone] ([phone]). If you don't have a family doctor or clinic, we will help you find one. [facility] are conveniently located to serve the needs of you and your family.             Review of your medicines      Our records  show that you are taking the medicines listed below. If these are incorrect, please call your family doctor or clinic.        Dose / Directions Last dose taken    BACLOFEN PO        Refills:  0        LISINOPRIL PO        Refills:  0        oxyCODONE 5 MG IR tablet   Commonly known as:  ROXICODONE   Dose:  5-10 mg   Quantity:  20 tablet        Take 1-2 tablets (5-10 mg) by mouth every 3 hours as needed for pain or other (Moderate to Severe)   Refills:  0        TYSABRI IV        Refills:  0                Procedures and tests performed during your visit     CBC + differential    Cardiac Continuous Monitoring    Chest CT, IV contrast only - PE protocol    Chest XR,  PA & LAT    Comprehensive metabolic panel    D dimer quantitative    EKG 12 lead    IV access    Lipase    Nt probnp inpatient (BNP)    Orthostatic blood pressure and pulse    Pulse oximetry nursing    Troponin I (now)      Orders Needing Specimen Collection     None      Pending Results     Date and Time Order Name Status Description    7/9/2017 1013 EKG 12 lead Preliminary             Pending Culture Results     No orders found from 7/7/2017 to 7/10/2017.            Pending Results Instructions     If you had any lab results that were not finalized at the time of your Discharge, you can call the ED Lab Result RN at [phone]. You will be contacted by this team for any positive Lab results or changes in treatment. The nurses are available 7 days a week from 10A to 6:30P.  You can leave a message 24 hours per day and they will return your call.        Test Results From Your Hospital Stay        7/9/2017 10:26 AM      Component Results     Component Value Ref Range & Units Status    WBC 10.8 4.0 - 11.0 10e9/L Final    RBC Count 4.97 4.4 - 5.9 10e12/L Final    Hemoglobin 15.1 13.3 - 17.7 g/dL Final    Hematocrit 44.5 40.0 - 53.0 % Final    MCV 90 78 - 100 fl Final    MCH 30.4 26.5 - 33.0 pg Final    MCHC 33.9 31.5 - 36.5 g/dL Final    RDW 15.2 (H) 10.0 -  15.0 % Final    Platelet Count 237 150 - 450 10e9/L Final    Diff Method Automated Method  Final    % Neutrophils 44.5 % Final    % Lymphocytes 40.5 % Final    % Monocytes 7.0 % Final    % Eosinophils 6.3 % Final    % Basophils 1.1 % Final    % Immature Granulocytes 0.6 % Final    Nucleated RBCs 0 0 /100 Final    Absolute Neutrophil 4.8 1.6 - 8.3 10e9/L Final    Absolute Lymphocytes 4.4 0.8 - 5.3 10e9/L Final    Absolute Monocytes 0.8 0.0 - 1.3 10e9/L Final    Absolute Eosinophils 0.7 0.0 - 0.7 10e9/L Final    Absolute Basophils 0.1 0.0 - 0.2 10e9/L Final    Abs Immature Granulocytes 0.1 0 - 0.4 10e9/L Final    Absolute Nucleated RBC 0.0  Final         7/9/2017 10:54 AM      Component Results     Component Value Ref Range & Units Status    Sodium 141 133 - 144 mmol/L Final    Potassium 3.7 3.4 - 5.3 mmol/L Final    Chloride 107 94 - 109 mmol/L Final    Carbon Dioxide 26 20 - 32 mmol/L Final    Anion Gap 8 3 - 14 mmol/L Final    Glucose 109 (H) 70 - 99 mg/dL Final    Urea Nitrogen 12 7 - 30 mg/dL Final    Creatinine 0.87 0.66 - 1.25 mg/dL Final    GFR Estimate 89 >60 mL/min/1.7m2 Final    Non  GFR Calc    GFR Estimate If Black >90   GFR Calc   >60 mL/min/1.7m2 Final    Calcium 9.0 8.5 - 10.1 mg/dL Final    Bilirubin Total 0.5 0.2 - 1.3 mg/dL Final    Albumin 4.0 3.4 - 5.0 g/dL Final    Protein Total 7.7 6.8 - 8.8 g/dL Final    Alkaline Phosphatase 81 40 - 150 U/L Final    ALT 35 0 - 70 U/L Final    AST 23 0 - 45 U/L Final         7/9/2017 10:39 AM      Component Results     Component Value Ref Range & Units Status    D Dimer 0.6 (H) 0.0 - 0.50 ug/ml FEU Final    This D-dimer assay is intended for use in conjunction with a clinical pretest   probability assessment model to exclude pulmonary embolism (PE) and deep   venous   thrombosis (DVT) in outpatients suspected of PE or DVT. The cut-off value is   0.5 ug/mL FEU.           7/9/2017 10:54 AM      Component Results     Component Value  Ref Range & Units Status    Troponin I ES  0.000 - 0.045 ug/L Final    <0.015  The 99th percentile for upper reference range is 0.045 ug/L.  Troponin values in   the range of 0.045 - 0.120 ug/L may be associated with risks of adverse   clinical events.           7/9/2017 10:52 AM      Narrative     XR CHEST 2 VW 7/9/2017 10:48 AM    HISTORY: Chest tightness.    COMPARISON: None.        Impression     IMPRESSION: The lungs are clear. No focal pulmonary opacities. Heart  and mediastinum are unremarkable. No acute cardiopulmonary  abnormalities.    GÓMEZ LABRIGHT MD         7/9/2017 10:54 AM      Component Results     Component Value Ref Range & Units Status    N-Terminal Pro BNP Inpatient 61 0 - 900 pg/mL Final    Reference range shown and results flagged as abnormal are suggested inpatient   cut points for confirming diagnosis if CHF in an acute setting. Establishing   a   baseline value for each individual patient is useful for follow-up. An   inpatient or emergency department NT-proPBNP <300 pg/mL effectively rules out   acute CHF, with 99% negative predictive value.  The outpatient non-acute reference range for ruling out CHF is:   0-125 pg/mL (age 18 to less than 75)   0-450 pg/mL (age 75 yrs and older)           7/9/2017 12:05 PM      Narrative     CT CHEST PULMONARY EMBOLISM W CONTRAST 7/9/2017 12:00 PM    HISTORY: chest tightness, lightheadedness, elevated dimer    TECHNIQUE: CT of the chest is performed with IV contrast per pulmonary  embolus protocol. 79mL Isovue-370 is utilized intravenously.   Radiation dose for this scan was reduced using automated exposure  control, adjustment of the mA and/or kV according to patient size, or  iterative reconstruction technique.    This study is particularly tailored to assess the pulmonary arteries  and their branch vessels.  Other assessed structures include the  lungs, mediastinum, pleura, and chest wall.    COMPARISON: None.    FINDINGS:    Lung  parenchyma:Clear.  Pleural effusions:None.  Pneumothorax:None.    Pulmonary arteries:There are no filling defects within the bilateral  pulmonary arteries to suggest significant pulmonary embolus.    Heart:Unremarkable.  Aorta:Normal.    Lymphadenopathy:No axillary, mediastinal, or hilar lymphadenopathy  appreciated.    Miscellaneous findings:Visualized portions of the upper abdomen  demonstrate cholelithiasis.        Impression     IMPRESSION:    1. No CT evidence of acute pulmonary embolus.  2. Cholelithiasis.    GÓMEZ ALBRIGHT MD         7/9/2017  1:29 PM      Component Results     Component Value Ref Range & Units Status    Lipase 138 73 - 393 U/L Final                Clinical Quality Measure: Blood Pressure Screening     Your blood pressure was checked while you were in the emergency department today. The last reading we obtained was  BP: 107/56 . Please read the guidelines below about what these numbers mean and what you should do about them.  If your systolic blood pressure (the top number) is less than 120 and your diastolic blood pressure (the bottom number) is less than 80, then your blood pressure is normal. There is nothing more that you need to do about it.  If your systolic blood pressure (the top number) is 120-139 or your diastolic blood pressure (the bottom number) is 80-89, your blood pressure may be higher than it should be. You should have your blood pressure rechecked within a year by a primary care provider.  If your systolic blood pressure (the top number) is 140 or greater or your diastolic blood pressure (the bottom number) is 90 or greater, you may have high blood pressure. High blood pressure is treatable, but if left untreated over time it can put you at risk for heart attack, stroke, or kidney failure. You should have your blood pressure rechecked by a primary care provider within the next 4 weeks.  If your provider in the emergency department today gave you specific instructions to  "follow-up with your doctor or provider even sooner than that, you should follow that instruction and not wait for up to 4 weeks for your follow-up visit.        Thank you for choosing [facility]       Thank you for choosing [facility] for your care. Our goal is always to provide you with excellent care. Hearing back from our patients is one way we can continue to improve our services. Please take a few minutes to complete the written survey that you may receive in the mail after you visit with us. Thank you!        [employer] Information     [employer] lets you send messages to your doctor, view your test results, renew your prescriptions, schedule appointments and more. To sign up, go to [email or web address] . Click on \"Log in\" on the left side of the screen, which will take you to the Welcome page. Then click on \"Sign up Now\" on the right side of the page.     You will be asked to enter the access code listed below, as well as some personal information. Please follow the directions to create your username and password.     Your access code is: [email or web address]  Expires: 2017  4:38 PM     Your access code will  in 90 days. If you need help or a new code, please call your [facility] clinic or [phone].        Care EveryWhere ID     This is your Care EveryWhere ID. This could be used by other organizations to access your [facility] medical records  [id number]        Equal Access to Services     ISIDRO SEVILLA : Hadii austin Glover, waciaran burden, qaybta [place] . So [facility] [phone].    ATENCIÓN: Si habla español, tiene a aparicio disposición servicios gratuitos de asistencia lingüística. Harris al [phone].    We comply with applicable federal civil rights laws and [place] laws. We do not discriminate on the basis of race, color, national origin, age, disability sex, sexual orientation or gender identity.            After Visit Summary       This is " your record. Keep this with you and show to your community pharmacist(s) and doctor(s) at your next visit.

## 2017-07-09 NOTE — DISCHARGE INSTRUCTIONS
Rest, fluids, follow up with primary care in 2 days for recheck.   Return for worsening chest tightness/pain, difficulty breathing, passing out, high fevers or any other new concerns.     Discharge Instructions  Chest Pain    You have been seen today for chest pain or discomfort.  At this time, your doctor has found no signs that your chest pain is due to a serious or life-threatening condition, (or you have declined more testing and/or admission to the hospital). However, sometimes there is a serious problem that does not show up right away. Your evaluation today may not be complete and you may need further testing and evaluation.     You need to follow-up with your regular doctor within 3 days.    Return to the Emergency Department if:    Your chest pain changes, gets worse, starts to happen more often, or comes with less activity.    You are short of breath.    You get very weak or tired.    You pass out or faint.    You have any new symptoms, like fever, cough, numb legs, or you cough up blood.    You have anything else that worries you.    Until you follow-up with your regular doctor please do the following:    Take one aspirin daily unless you have an allergy or are told not to by your doctor.    If a stress test appointment has been made, go to the appointment.    If you have questions, contact your regular doctor.    If your doctor today has told you to follow-up with your regular doctor, it is very important that you make an appointment with your clinic and go to the appointment.  If you do not follow-up with your primary doctor, it may result in missing an important development which could result in permanent injury or disability and/or lasting pain.  If there is any problem keeping your appointment, call your doctor or return to the Emergency Department.    If you were given a prescription for medicine here today, be sure to read all of the information (including the package insert) that comes with your  prescription.  This will include important information about the medicine, its side effects, and any warnings that you need to know about.  The pharmacist who fills the prescription can provide more information and answer questions you may have about the medicine.  If you have questions or concerns that the pharmacist cannot address, please call or return to the Emergency Department.     Remember that you can always come back to the Emergency Department if you are not able to see your regular doctor in the amount of time listed above, if you get any new symptoms, or if there is anything that worries you.

## 2017-07-09 NOTE — ED PROVIDER NOTES
"  History     Chief Complaint:  Chest pain and lightheadedness     HPI   Linda Grover is a 61 year old male who presents with chest pain and lightheadedness. The patient states that for the past several months he has had occasional episodes of mid-sternal chest pain that he describes as tightness and \"discomfort\". This past Friday, 7/7, the patient's wife reports that the patient had a heparin infusion performed, which he has had done for the past year and a half. However, afterwards, the patient was noted to be pale and lightheaded. For the past couple of days, he reports that this chest discomfort has become persistent, unaffected by positional changes, and that during the mornings, he has experienced consistent lightheadedness that per patient is not affected nor brought upon by positional changes.     Earlier this morning, per wife, the patient had an episode where his face became red, he became lightheaded, and which required him laying down for a number of seconds before the lightheadedness alleviated. His chest discomfort was particularly present after this episode, which prompted them to present to the ED for evaluation. Currently, the patient rates his discomfort as a 2-3/10, endorses lightheadedness, but denies any shortness of breath. He denies any nausea, vomiting, or any abdominal pain. The patient also denies any syncopal episodes, fever, changes in medications, or any leg swelling during this time.     Of note, the patient was in [place] this past June from 6/15-6/26 to visit family.     Cardiac Risk Factors:  SEX:              M  Tobacco:              Former smoker   Hypertension:       P  Diabetes:             N  Lipids:                N  Personal history:   N  Family History:       N    PE and DVT Risk Factors:  Personal hx of PE/DVT:  N  Family hx of PE/DVT:  N  Recent travel:    [place] 6/15-6/26  Recent surgery:   N  Other immobilizations:  N  Hx cancer:    N  Hormone use:   N    Allergies:  NKDA "     Medications:    Natalizumab (TYSABRI IV)   oxyCODONE (ROXICODONE) 5 MG IR tablet   BACLOFEN PO   LISINOPRIL PO     Past Medical History:    HTN  MS  Right BBB    Past Surgical History:    Close secondary wound abdomen     Family History:    No past pertinent family history.    Social History:  Former smoker  Positive for alcohol use.   Accompanied by wife   Marital Status:        Review of Systems   Constitutional: Negative for fever.   Respiratory: Negative for shortness of breath.    Cardiovascular: Positive for chest pain. Negative for leg swelling.   Gastrointestinal: Negative for abdominal pain, nausea and vomiting.   Neurological: Positive for light-headedness.   All other systems reviewed and are negative.    Physical Exam     Patient Vitals for the past 24 hrs:   BP Temp Temp src Pulse Heart Rate Resp SpO2   07/09/17 1330 - - - - 67 - 98 %   07/09/17 1315 107/56 - - - 69 - 98 %   07/09/17 1300 122/62 - - - 64 - 97 %   07/09/17 1245 121/61 - - - 67 - 95 %   07/09/17 1230 132/66 - - - 75 - 95 %   07/09/17 1215 136/79 - - - 73 - 97 %   07/09/17 1200 147/82 - - - - - 97 %   07/09/17 1145 134/66 - - - - - 97 %   07/09/17 1130 139/67 - - - - - -   07/09/17 1030 134/85 - - - 70 - 100 %   07/09/17 1028 - 99.1  F (37.3  C) - - - - -   07/09/17 1013 (!) 148/91 - Oral 73 73 16 97 %     Physical Exam  Nursing note and vitals reviewed.     GENERAL: Alert, mild distress, non toxic appearing.   HEENT: Normal conjunctiva. No scleral icterus. MMM.   NECK: Supple.  CHEST: Reproducible tenderness over lower sternum. No overlying skin changes.   CARDIAC: Normal rate and regular rhythm. Normal heart sounds. No murmurs, rubs, or gallops appreciated. Intact distal radial pulses 2+.   PULMONARY: CTA bilaterally. Normal breath sounds. No wheezing, crackles, or rhonchi appreciated.  ABDOMEN: Soft, non distended abdomen. Non-tender. No rebound or guarding.   NEURO: Alert and oriented. Non-focal.   MUSCULOSKELETAL: Normal  range of motion. No peripheral edema. No calf tenderness bilaterally.   SKIN: Skin is warm and dry. No rashes. No pallor or jaundice.   PSYCH: Normal affect and mood.     Emergency Department Course   ECG:  Indication: shortness of breath  Time: 1007  Vent. Rate 79 bpm. RI interval 140. QRS duration 136. QT/QTc 404/463. P-R-T axis 62 -11 9. Normal sinus rhythm. Right bundle branch block. patient has had a previous right BBB Abnormal ECG.  Read time: 1011.     Imaging:  Radiographic findings were communicated with the patient and wife who voiced understanding of the findings.    XR chest, PA & LAT  The lungs are clear. No focal pulmonary opacities. Heart  and mediastinum are unremarkable. No acute cardiopulmonary  abnormalities. As per radiology.     CT chest, IV contrast only:  1. No CT evidence of acute pulmonary embolus.  2. Cholelithiasis. As per radiology.     Laboratory:  CBC: WBC: 10.8, HGB: 15.1, PLT: 237  CMP: Glucose 109 (H), o/w WNL (Creatinine: 0.87)    D-dimer: 0.6  Troponin I: <0.015  BNP:61     Lipase: 138    Interventions:  1041 NS 1L IV   1214 Nitro 0.4 mg sublingual  1221 Nitro 0.4 mg sublingual   1256 Toradol 30 mg IV     Emergency Department Course:  EKG obtained in the ED, see results above.     IV inserted. Medicine administered as documented above.     Blood drawn. This was sent to the lab for further testing, results above.    Nursing notes and vitals reviewed.  I performed an exam of the patient as documented above.     The patient's orthostatic vitals were monitored here in the emergency department and found to be 143/80 while laying, 139/89 while sitting, and 140/88 while standing.    The patient was sent for a chest XR while in the emergency department, findings above.     1052 I reevaluated the patient and provided an update in regards to his ED course. I recommended that a CT chest be performed, and the patient agrees to have this done.     The patient was sent for a CT chest while in  the emergency department, findings above.     1208 I updated the patient in regards to his CT results.   1221 No change in chest pain with nitro, per nurse.   1230 I rechecked on the patient. He says that his pain is not improved.   1314 The patient states that his pain is slightly improved following the Toradol.     Findings and plan explained to the Patient and wife. Patient discharged home with instructions regarding supportive care, medications, and reasons to return. The importance of close follow-up was reviewed.     I personally reviewed the laboratory results with the Patient and wife and answered all related questions prior to discharge.     Impression & Plan      Medical Decision Making:  Linda Grover is a 61 year old male with a history of hypertension and multiple sclerosis who presents to the ED for evaluation of chest tightness, constant for the past two days, with associated lightheadedness, no syncope. Here, he is afebrile, hemodynamically stable and non toxic appearing. No evidence of orthostatic hypotension. Heart and lungs normal. No signs of respiratory distress. ECG shows normal sinus rhythm with a right bundle branch block which he has had in the past. No acute ischemic changes and troponin is unremarkable making acute coronary syndrome less likely. He also had no improvement following nitro. Given his recent, travel, PE considered and d-dimer slightly elevated. CT chest subsequently obtained which was negative for acute pulmonary embolism, pleural effusion, pneumothorax, pneumonia or dissection. He incidentally had gallstones. No significant tenderness in RUQ and liver function tests within normal limits making acute hepatobiliary disease less likely. Lipase WNL making pancreatitis unlikely. No evidence of anemia or acute electrolyte abnormality. No clinical evidence of fluid overload and BNP WNL making CHF unlikely.     At this time, the etiology of his symptoms are unclear, though possibly  costochondritis given his reproducible tenderness over the lower sternum on exam. He did have improvement the tightness sensation with Toradol, favoring this. Will have him follow up with his primary care provider in days for recheck. Recommended rest and fluids. Reviewed reasons to return to ED, including worsening symptoms or any new concerns. The patient was in agreement with plan and discharged in satisfactory condition with all questions answered.      Diagnosis:    ICD-10-CM    1. Lightheadedness R42 Lipase   2. Atypical chest pain R07.89        I, Otis Juarez, am serving as a scribe on 7/9/2017 at 10:02 AM to personally document services performed by Lyn Stewart PA-C based on my observations and the provider's statements to me.     Otis Juarez  7/9/2017   [facility] EMERGENCY DEPARTMENT       Lyn Stewart PA-C  07/09/17 2253

## 2017-07-10 LAB — INTERPRETATION ECG - MUSE: NORMAL

## 2017-07-12 ENCOUNTER — TRANSFERRED RECORDS (OUTPATIENT)
Dept: HEALTH INFORMATION MANAGEMENT | Facility: CLINIC | Age: 62
End: 2017-07-12

## 2017-07-20 ENCOUNTER — MEDICAL CORRESPONDENCE (OUTPATIENT)
Dept: HEALTH INFORMATION MANAGEMENT | Facility: CLINIC | Age: 62
End: 2017-07-20

## 2017-07-28 DIAGNOSIS — G35 MULTIPLE SCLEROSIS (H): Primary | ICD-10-CM

## 2017-07-28 RX ORDER — ACETAMINOPHEN 500 MG
1000 TABLET ORAL ONCE
Status: CANCELLED | OUTPATIENT
Start: 2017-09-18

## 2017-07-28 RX ORDER — MEPERIDINE HYDROCHLORIDE 50 MG/1
50 TABLET ORAL ONCE
Status: CANCELLED
Start: 2017-09-18 | End: 2017-09-18

## 2017-07-28 RX ORDER — DIPHENHYDRAMINE HCL 25 MG
25 CAPSULE ORAL EVERY 4 HOURS PRN
Status: CANCELLED
Start: 2017-09-18

## 2017-07-28 RX ORDER — IBUPROFEN 200 MG
800 TABLET ORAL EVERY 6 HOURS PRN
Status: CANCELLED | OUTPATIENT
Start: 2017-09-18

## 2017-07-28 RX ORDER — PROMETHAZINE HYDROCHLORIDE 25 MG/ML
25 INJECTION, SOLUTION INTRAMUSCULAR; INTRAVENOUS
Status: CANCELLED
Start: 2017-09-18

## 2017-07-28 RX ORDER — ACETAMINOPHEN 500 MG
1000 TABLET ORAL EVERY 4 HOURS PRN
Status: CANCELLED | OUTPATIENT
Start: 2017-09-18

## 2017-07-28 RX ORDER — HYDROXYZINE HYDROCHLORIDE 50 MG/1
50 TABLET, FILM COATED ORAL ONCE
Status: CANCELLED
Start: 2017-09-18 | End: 2017-09-18

## 2017-07-28 RX ORDER — DIPHENHYDRAMINE HYDROCHLORIDE 50 MG/ML
25 INJECTION INTRAMUSCULAR; INTRAVENOUS
Status: CANCELLED | OUTPATIENT
Start: 2017-09-18

## 2017-07-28 RX ORDER — HYDROXYZINE HYDROCHLORIDE 50 MG/1
50 TABLET, FILM COATED ORAL
Status: CANCELLED
Start: 2017-09-18

## 2017-07-28 RX ORDER — MEPERIDINE HYDROCHLORIDE 50 MG/1
50 TABLET ORAL EVERY 4 HOURS PRN
Status: CANCELLED
Start: 2017-09-18

## 2017-07-28 RX ORDER — EPINEPHRINE 0.3 MG/.3ML
0.3 INJECTION SUBCUTANEOUS
Status: CANCELLED | OUTPATIENT
Start: 2017-09-18

## 2017-09-19 ENCOUNTER — DOCUMENTATION ONLY (OUTPATIENT)
Dept: PHARMACY | Facility: CLINIC | Age: 62
End: 2017-09-19

## 2017-09-19 NOTE — PROGRESS NOTES
Called patient about his upcoming appointment for Lemtrada on Wednesday 9/20/17. I reminded the patient to take hydroxyzine and ranitidine today (1 day before Lemtrada infusion). I also told the patient to bring his medications (ranitidine, hydroxyzine and meperidine to the appointment tomorrow. I told him that his nurse will instruct him when to take the medications tomorrow. The patient verbalized understanding.    Jim FrostD.

## 2017-09-20 ENCOUNTER — INFUSION THERAPY VISIT (OUTPATIENT)
Dept: INFUSION THERAPY | Facility: CLINIC | Age: 62
End: 2017-09-20
Attending: PSYCHIATRY & NEUROLOGY
Payer: COMMERCIAL

## 2017-09-20 VITALS
SYSTOLIC BLOOD PRESSURE: 150 MMHG | TEMPERATURE: 98 F | RESPIRATION RATE: 16 BRPM | DIASTOLIC BLOOD PRESSURE: 81 MMHG | HEART RATE: 87 BPM

## 2017-09-20 DIAGNOSIS — G35 MULTIPLE SCLEROSIS (H): Primary | ICD-10-CM

## 2017-09-20 PROCEDURE — 96415 CHEMO IV INFUSION ADDL HR: CPT

## 2017-09-20 PROCEDURE — 25000128 H RX IP 250 OP 636: Performed by: PSYCHIATRY & NEUROLOGY

## 2017-09-20 PROCEDURE — 96413 CHEMO IV INFUSION 1 HR: CPT

## 2017-09-20 PROCEDURE — 25000132 ZZH RX MED GY IP 250 OP 250 PS 637: Performed by: PSYCHIATRY & NEUROLOGY

## 2017-09-20 PROCEDURE — 96367 TX/PROPH/DG ADDL SEQ IV INF: CPT

## 2017-09-20 RX ORDER — HYDROXYZINE HYDROCHLORIDE 50 MG/1
50 TABLET, FILM COATED ORAL ONCE
Status: COMPLETED | OUTPATIENT
Start: 2017-09-20 | End: 2017-09-20

## 2017-09-20 RX ORDER — ACETAMINOPHEN 500 MG
1000 TABLET ORAL EVERY 4 HOURS PRN
Status: CANCELLED | OUTPATIENT
Start: 2017-09-20

## 2017-09-20 RX ORDER — PROMETHAZINE HYDROCHLORIDE 25 MG/ML
25 INJECTION, SOLUTION INTRAMUSCULAR; INTRAVENOUS
Status: CANCELLED
Start: 2017-09-20

## 2017-09-20 RX ORDER — MEPERIDINE HYDROCHLORIDE 50 MG/1
50 TABLET ORAL ONCE
Status: CANCELLED
Start: 2017-09-20 | End: 2017-09-20

## 2017-09-20 RX ORDER — ACETAMINOPHEN 500 MG
1000 TABLET ORAL ONCE
Status: CANCELLED | OUTPATIENT
Start: 2017-09-20

## 2017-09-20 RX ORDER — IBUPROFEN 200 MG
800 TABLET ORAL EVERY 6 HOURS PRN
Status: CANCELLED | OUTPATIENT
Start: 2017-09-20

## 2017-09-20 RX ORDER — MEPERIDINE HYDROCHLORIDE 50 MG/1
50 TABLET ORAL EVERY 4 HOURS PRN
Status: CANCELLED
Start: 2017-09-20

## 2017-09-20 RX ORDER — EPINEPHRINE 0.3 MG/.3ML
0.3 INJECTION SUBCUTANEOUS
Status: CANCELLED | OUTPATIENT
Start: 2017-09-20

## 2017-09-20 RX ORDER — HYDROXYZINE HYDROCHLORIDE 50 MG/1
50 TABLET, FILM COATED ORAL ONCE
Status: CANCELLED
Start: 2017-09-20 | End: 2017-09-20

## 2017-09-20 RX ORDER — HYDROXYZINE HYDROCHLORIDE 50 MG/1
50 TABLET, FILM COATED ORAL
Status: CANCELLED
Start: 2017-09-20

## 2017-09-20 RX ORDER — ACETAMINOPHEN 500 MG
1000 TABLET ORAL ONCE
Status: COMPLETED | OUTPATIENT
Start: 2017-09-20 | End: 2017-09-20

## 2017-09-20 RX ORDER — DIPHENHYDRAMINE HYDROCHLORIDE 50 MG/ML
25 INJECTION INTRAMUSCULAR; INTRAVENOUS
Status: CANCELLED | OUTPATIENT
Start: 2017-09-20

## 2017-09-20 RX ORDER — DIPHENHYDRAMINE HCL 25 MG
25 CAPSULE ORAL EVERY 4 HOURS PRN
Status: CANCELLED
Start: 2017-09-20

## 2017-09-20 RX ORDER — MEPERIDINE HYDROCHLORIDE 50 MG/1
50 TABLET ORAL ONCE
Status: COMPLETED | OUTPATIENT
Start: 2017-09-20 | End: 2017-09-20

## 2017-09-20 RX ADMIN — ACETAMINOPHEN 1000 MG: 500 TABLET ORAL at 09:02

## 2017-09-20 RX ADMIN — RENAGEL 12 MG: 400 TABLET ORAL at 09:31

## 2017-09-20 RX ADMIN — SODIUM CHLORIDE 1000 MG: 0.9 INJECTION, SOLUTION INTRAVENOUS at 08:27

## 2017-09-20 RX ADMIN — HYDROXYZINE HYDROCHLORIDE 50 MG: 50 TABLET, FILM COATED ORAL at 09:02

## 2017-09-20 RX ADMIN — MEPERIDINE HYDROCHLORIDE 50 MG: 50 TABLET ORAL at 09:02

## 2017-09-20 ASSESSMENT — PAIN SCALES - GENERAL: PAINLEVEL: NO PAIN (0)

## 2017-09-20 NOTE — PATIENT INSTRUCTIONS
Thank you for choosing [facility]. The following information is a summary of your appointment as well as important reminders:      Last dose Tylenol was   Last dose Ibuprofen was   Last dose Benadryl was     Care for yourself after your infusions:  1.  Notify your doctor or seek appropriate medical attention as soon as possible if you experience any discomfort or anything that feels out of the ordinary--including swelling in your mouth or throat, trouble breathing, weakness; fast, slow or irregular heartbeat, chest pain or rash.    2.  Please continue all post infusion medications as prescribed by your healthcare provider.  3.  Stay hydrated.  Be sure to drink plenty of water.  4.  Rest.  You may feel tired, so take it easy and don't overexert yourself.  5.  Eat your regular meals and usual portion sizes as tolerated.   6.  Be mindful of infections and ways to help reduce your risk.      We look forward in seeing you on your next appointment here at [facility].  Please don t hesitate to call us at [phone] to reschedule any of your appointments or to speak with one of our registered nurses.  It was a pleasure taking care of you today.    Sincerely,    [facility]  [facility]  [address] [place] [address]  Phone: [phone]

## 2017-09-20 NOTE — PROGRESS NOTES
Infusion Nursing Note:  Linda Grover presents today for Lemtrada.    Patient seen by provider today: No   present during visit today: Not Applicable.    Note: N/A.    Intravenous Access:  Peripheral IV placed.    Treatment Conditions:    Lemtrada Infusion Nursing Note:    Pre infusion Assessment:    1. Is patient authorized and have they received the Lemtrada treatment and infusion reactions patient guide? YES.  2. If ordered, has patient taken pre-medications? NO patient took Zantac prior to arrival.  3. Does patient have any of the following signs of infection: fever, cough, sore throat, wounds, UTI symptoms? NO.  4. Is patient taking any oral, IV or topical anti-infective medication? NO.  5. Has patient been in contact with anyone with TB? NO.  6. Has patient traveled outside the country in the last 21 days? NO.  7. Has patient had a flu shot or vaccine in the last 6 weeks? NO  8. Plan~ A. Therapy is appropriate, will proceed with treatment today?                                        YES.      B. Medication will be held today and patient will be re-assessed at a later                      Date: Not Applicable.    Lemtrada - REMS requirements:    9. Lemtrada REMS infusion checklist reviewed and proper information documented: YES.  10. Lemtrada REMS infusion checklist completed online daily and submitted after FINAL infusion: YES.  11. Post-infusion instructions reviewed and given to patient: NO.  15. Adverse reactions reported to: N/A.      Post Infusion Assessment:  Patient tolerated infusion without incident.  Patient observed for 120 minutes post Lemtrada per protocol.  Site patent and intact, free from redness, edema or discomfort.  No evidence of extravasations.  Access discontinued.     Discharge Plan:   Discharge instructions reviewed with: Patient and Family.  Patient and/or family verbalized understanding of discharge instructions and all questions answered.  AVS to patient via Reunify.   Patient will return 9/21/2017 for next appointment.   Patient discharged in stable condition with his wife.  Departure Mode: Ambulatory    Odalis Tarango RN

## 2017-09-20 NOTE — MR AVS SNAPSHOT
After Visit Summary   9/20/2017    Linda Grover    MRN: [id number]           Patient Information     Date Of Birth          1955        Visit Information        Provider Department      9/20/2017 8:00 AM  INFUSION CHAIR 9 [facility]        Today's Diagnoses     Multiple sclerosis (H)    -  1      Care Instructions    Thank you for choosing [facility]. The following information is a summary of your appointment as well as important reminders:      Last dose Tylenol was   Last dose Ibuprofen was   Last dose Benadryl was     Care for yourself after your infusions:  1.  Notify your doctor or seek appropriate medical attention as soon as possible if you experience any discomfort or anything that feels out of the ordinary--including swelling in your mouth or throat, trouble breathing, weakness; fast, slow or irregular heartbeat, chest pain or rash.    2.  Please continue all post infusion medications as prescribed by your healthcare provider.  3.  Stay hydrated.  Be sure to drink plenty of water.  4.  Rest.  You may feel tired, so take it easy and don't overexert yourself.  5.  Eat your regular meals and usual portion sizes as tolerated.   6.  Be mindful of infections and ways to help reduce your risk.      We look forward in seeing you on your next appointment here at [facility] [place].  Please don t hesitate to call us at [phone] to reschedule any of your appointments or to speak with one of our registered nurses.  It was a pleasure taking care of you today.    Sincerely,    [facility]  [facility]  [address] [place] [address]  Phone: [phone]                  Follow-ups after your visit        Your next 10 appointments already scheduled     Sep 21, 2017  8:00 AM CDT   Level 7 with  INFUSION CHAIR 17  "  [facility] ([facility])    [facility]  [address]  [place] [address]   [phone]            Sep 22, 2017  7:30 AM CDT   Level 7 with SH INFUSION CHAIR 9   [facility] ([facility])    [facility] [place]  [address]  [place] [address]   [phone]            Sep 25, 2017  8:00 AM CDT   Level 7 with SH INFUSION CHAIR 17   [facility])    [facility] [place]  [address]  [place] [address]   [phone]            Sep 26, 2017  8:00 AM CDT   Level 7 with SH INFUSION CHAIR 4   [facility] ([facility])    [place] [id number]  [place] [address]   [phone]              Who to contact     If you have questions or need follow up information about today's clinic visit or your schedule please contact [facility] directly at [phone].  Normal or non-critical lab and imaging results will be communicated to you by VIOlifehart, letter or phone within 4 business days after the clinic has received the results. If you do not hear from us within 7 days, please contact the clinic through MyChart or phone. If you have a critical or abnormal lab result, we will notify you by phone as soon as possible.  Submit refill requests through ADOP or call your pharmacy and they will forward the refill request to us. Please allow 3 business days for your refill to be completed.          Additional Information About Your Visit        ADOP Information     ADOP lets you send messages to your doctor, view your test results, renew your prescriptions, schedule appointments and more. To sign up, go to [email or web address] . Click on \"Log in\" on the left side of the " "screen, which will take you to the Welcome page. Then click on \"Sign up Now\" on the right side of the page.     You will be asked to enter the access code listed below, as well as some personal information. Please follow the directions to create your username and password.     Your access code is: [email or web address]  Expires: 2017  2:56 PM     Your access code will  in 90 days. If you need help or a new code, please call your [facility] clinic or [phone].        Care EveryWhere ID     This is your Care EveryWhere ID. This could be used by other organizations to access your [facility] medical records  [id number]        Your Vitals Were     Pulse Temperature Respirations             80 98  F (36.7  C) (Oral) 16          Blood Pressure from Last 3 Encounters:   17 133/76   17 107/56   17 (!) 172/99    Weight from Last 3 Encounters:   11/25/15 104.3 kg (230 lb)   01/19/15 99.8 kg (220 lb)              Today, you had the following     No orders found for display       Primary Care Provider Office Phone # Fax #    Marialuisa Beavers [phone]       [address] [place] [address] [facility] [place] [address]        Equal Access to Services     [facility]: Hadii aad ku hadasho Soomaali, waaxda luqadaha, qaybta kaalmada adeegyada, waxay merry hayjerald guevara . So [facility] [phone].    ATENCIÓN: Si habla español, tiene a aparicio disposición servicios gratuitos de asistencia lingüística. Llame al [phone].    We comply with applicable federal civil rights laws and [place] laws. We do not discriminate on the basis of race, color, national origin, age, disability sex, sexual orientation or gender identity.            Thank you!     Thank you for choosing [facility] AND [facility]  for your care. Our goal is always to provide you with excellent care. Hearing back from our patients is one way we can continue to improve our services. Please take a " few minutes to complete the written survey that you may receive in the mail after your visit with us. Thank you!             Your Updated Medication List - Protect others around you: Learn how to safely use, store and throw away your medicines at www.disposemymeds.org.          This list is accurate as of: 9/20/17  2:56 PM.  Always use your most recent med list.                   Brand Name Dispense Instructions for use Diagnosis    BACLOFEN PO           LISINOPRIL PO           oxyCODONE 5 MG IR tablet    ROXICODONE    20 tablet    Take 1-2 tablets (5-10 mg) by mouth every 3 hours as needed for pain or other (Moderate to Severe)    Puncture wound of abdomen, initial encounter       NEELIMA FELDMAN

## 2017-09-21 ENCOUNTER — INFUSION THERAPY VISIT (OUTPATIENT)
Dept: INFUSION THERAPY | Facility: CLINIC | Age: 62
End: 2017-09-21
Attending: PSYCHIATRY & NEUROLOGY
Payer: COMMERCIAL

## 2017-09-21 VITALS
RESPIRATION RATE: 18 BRPM | TEMPERATURE: 97.8 F | DIASTOLIC BLOOD PRESSURE: 75 MMHG | SYSTOLIC BLOOD PRESSURE: 156 MMHG | OXYGEN SATURATION: 95 % | HEART RATE: 65 BPM

## 2017-09-21 DIAGNOSIS — G35 MULTIPLE SCLEROSIS (H): Primary | ICD-10-CM

## 2017-09-21 PROCEDURE — 96415 CHEMO IV INFUSION ADDL HR: CPT

## 2017-09-21 PROCEDURE — 25000128 H RX IP 250 OP 636: Performed by: PSYCHIATRY & NEUROLOGY

## 2017-09-21 PROCEDURE — 96367 TX/PROPH/DG ADDL SEQ IV INF: CPT

## 2017-09-21 PROCEDURE — 25000132 ZZH RX MED GY IP 250 OP 250 PS 637: Performed by: PSYCHIATRY & NEUROLOGY

## 2017-09-21 PROCEDURE — 96413 CHEMO IV INFUSION 1 HR: CPT

## 2017-09-21 RX ORDER — ACETAMINOPHEN 500 MG
1000 TABLET ORAL ONCE
Status: CANCELLED | OUTPATIENT
Start: 2017-09-21

## 2017-09-21 RX ORDER — ACETAMINOPHEN 500 MG
1000 TABLET ORAL EVERY 4 HOURS PRN
Status: CANCELLED | OUTPATIENT
Start: 2017-09-21

## 2017-09-21 RX ORDER — HYDROXYZINE HYDROCHLORIDE 50 MG/1
50 TABLET, FILM COATED ORAL ONCE
Status: DISCONTINUED | OUTPATIENT
Start: 2017-09-21 | End: 2017-09-21 | Stop reason: HOSPADM

## 2017-09-21 RX ORDER — HYDROXYZINE HYDROCHLORIDE 50 MG/1
50 TABLET, FILM COATED ORAL ONCE
Status: CANCELLED
Start: 2017-09-21 | End: 2017-09-21

## 2017-09-21 RX ORDER — PROMETHAZINE HYDROCHLORIDE 25 MG/ML
25 INJECTION, SOLUTION INTRAMUSCULAR; INTRAVENOUS
Status: CANCELLED
Start: 2017-09-21

## 2017-09-21 RX ORDER — HYDROXYZINE HYDROCHLORIDE 50 MG/1
50 TABLET, FILM COATED ORAL
Status: CANCELLED
Start: 2017-09-21

## 2017-09-21 RX ORDER — MEPERIDINE HYDROCHLORIDE 50 MG/1
50 TABLET ORAL EVERY 4 HOURS PRN
Status: CANCELLED
Start: 2017-09-21

## 2017-09-21 RX ORDER — DIPHENHYDRAMINE HCL 25 MG
25 CAPSULE ORAL EVERY 4 HOURS PRN
Status: CANCELLED
Start: 2017-09-21

## 2017-09-21 RX ORDER — DIPHENHYDRAMINE HYDROCHLORIDE 50 MG/ML
25 INJECTION INTRAMUSCULAR; INTRAVENOUS
Status: CANCELLED | OUTPATIENT
Start: 2017-09-21

## 2017-09-21 RX ORDER — MEPERIDINE HYDROCHLORIDE 50 MG/1
50 TABLET ORAL ONCE
Status: DISCONTINUED | OUTPATIENT
Start: 2017-09-21 | End: 2017-09-21 | Stop reason: HOSPADM

## 2017-09-21 RX ORDER — ACETAMINOPHEN 500 MG
1000 TABLET ORAL ONCE
Status: COMPLETED | OUTPATIENT
Start: 2017-09-21 | End: 2017-09-21

## 2017-09-21 RX ORDER — IBUPROFEN 200 MG
800 TABLET ORAL EVERY 6 HOURS PRN
Status: CANCELLED | OUTPATIENT
Start: 2017-09-21

## 2017-09-21 RX ORDER — MEPERIDINE HYDROCHLORIDE 50 MG/1
50 TABLET ORAL ONCE
Status: CANCELLED
Start: 2017-09-21 | End: 2017-09-21

## 2017-09-21 RX ORDER — EPINEPHRINE 0.3 MG/.3ML
0.3 INJECTION SUBCUTANEOUS
Status: CANCELLED | OUTPATIENT
Start: 2017-09-21

## 2017-09-21 RX ADMIN — RENAGEL 12 MG: 400 TABLET ORAL at 09:42

## 2017-09-21 RX ADMIN — ACETAMINOPHEN 1000 MG: 500 TABLET ORAL at 08:37

## 2017-09-21 RX ADMIN — SODIUM CHLORIDE 1000 MG: 0.9 INJECTION, SOLUTION INTRAVENOUS at 08:38

## 2017-09-21 ASSESSMENT — PAIN SCALES - GENERAL: PAINLEVEL: NO PAIN (0)

## 2017-09-21 NOTE — PATIENT INSTRUCTIONS
Thank you for choosing [facility]. The following information is a summary of your appointment as well as important reminders:      Last dose Tylenol was 0837 here in clinic today.  Last dose Ibuprofen was none given.  Last dose Benadryl was none given.    Care for yourself after your infusions:  1.  Notify your doctor or seek appropriate medical attention as soon as possible if you experience any discomfort or anything that feels out of the ordinary--including swelling in your mouth or throat, trouble breathing, weakness; fast, slow or irregular heartbeat, chest pain or rash.    2.  Please continue all post infusion medications as prescribed by your healthcare provider.  3.  Stay hydrated.  Be sure to drink plenty of water.  4.  Rest.  You may feel tired, so take it easy and don't overexert yourself.  5.  Eat your regular meals and usual portion sizes as tolerated.   6.  Be mindful of infections and ways to help reduce your risk.      We look forward in seeing you on your next appointment here at [facility].  Please don t hesitate to call us at [phone] to reschedule any of your appointments or to speak with one of our registered nurses.  It was a pleasure taking care of you today.    Sincerely,    [facility]  [facility]  [address] [place] [address]  Phone: [phone]

## 2017-09-21 NOTE — MR AVS SNAPSHOT
After Visit Summary   9/21/2017    Linda Grover    MRN: [id number]           Patient Information     Date Of Birth          1955        Visit Information        Provider Department      9/21/2017 8:00 AM  INFUSION CHAIR 17 [facility] and Infusion Center        Today's Diagnoses     Multiple sclerosis (H)    -  1      Care Instructions    Thank you for choosing [facility] [place]. The following information is a summary of your appointment as well as important reminders:      Last dose Tylenol was 0837 here in clinic today.  Last dose Ibuprofen was none given.  Last dose Benadryl was none given.    Care for yourself after your infusions:  1.  Notify your doctor or seek appropriate medical attention as soon as possible if you experience any discomfort or anything that feels out of the ordinary--including swelling in your mouth or throat, trouble breathing, weakness; fast, slow or irregular heartbeat, chest pain or rash.    2.  Please continue all post infusion medications as prescribed by your healthcare provider.  3.  Stay hydrated.  Be sure to drink plenty of water.  4.  Rest.  You may feel tired, so take it easy and don't overexert yourself.  5.  Eat your regular meals and usual portion sizes as tolerated.   6.  Be mindful of infections and ways to help reduce your risk.      We look forward in seeing you on your next appointment here at [facility] [place].  Please don t hesitate to call us at [phone] to reschedule any of your appointments or to speak with one of our registered nurses.  It was a pleasure taking care of you today.    Sincerely,    [facility] Phys[facility]  [facility]  [address] [place] [address]  Phone: [phone]            Follow-ups after your visit        Your next 10 appointments already scheduled     Sep 22, 2017  7:30 AM  "CDT   Level 7 with  INFUSION CHAIR 9   [facility] ([facility])    [facility] [place] a[address]ilyn [address]  [place] [address]   [phone]            Sep 25, 2017  8:00 AM CDT   Level 7 with SH INFUSION CHAIR 17   [facility] ([facility])    [facility] [place] [address]  [place] [address]   [phone]            Sep 26, 2017  8:00 AM CDT   Level 7 with SH INFUSION CHAIR 4   [facility] Infusion Center ([facility])    [facility] [place] [address]  [place] [address] [phone]              Who to contact     If you have questions or need follow up information about today's clinic visit or your schedule please contact [facility] AND INFUSION CENTER directly at [phone].  Normal or non-critical lab and imaging results will be communicated to you by Hint Inchart, letter or phone within 4 business days after the clinic has received the results. If you do not hear from us within 7 days, please contact the clinic through MocoSpacet or phone. If you have a critical or abnormal lab result, we will notify you by phone as soon as possible.  Submit refill requests through VDP or call your pharmacy and they will forward the refill request to us. Please allow 3 business days for your refill to be completed.          Additional Information About Your Visit        VDP Information     VDP lets you send messages to your doctor, view your test results, renew your prescriptions, schedule appointments and more. To sign up, go to [email or web address] . Click on \"Log in\" on the left side of the screen, which will take you to the Welcome page. Then click on \"Sign up Now\" on the right side of the page.     You will be asked to enter the access code listed below, as well as some personal " information. Please follow the directions to create your username and password.     Your access code is: [email or web address]  Expires: 2017  2:56 PM     Your access code will  in 90 days. If you need help or a new code, please call your [place] clinic or [phone].        Care EveryWhere ID     This is your Care EveryWhere ID. This could be used by other organizations to access your [place] medical records  [id number]        Your Vitals Were     Pulse Temperature Respirations Pulse Oximetry          65 97.8  F (36.6  C) (Oral) 18 95%         Blood Pressure from Last 3 Encounters:   17 156/75   17 150/81   17 107/56    Weight from Last 3 Encounters:   11/25/15 104.3 kg (230 lb)   01/19/15 99.8 kg (220 lb)              Today, you had the following     No orders found for display         Today's Medication Changes          These changes are accurate as of: 17  4:15 PM.  If you have any questions, ask your nurse or doctor.               Stop taking these medicines if you haven't already. Please contact your care team if you have questions.     NEELIMA FELDMAN                    Primary Care Provider Office Phone # Fax #    Marialuisa Beavers [phone]       [facility] [place] [address] [place] [address]        Equal Access to Services     [facility]: Hadii austin bravo hadasho Soze, waaxda luqadaha, qaybta kaalmada ronal zapien . So [facility] [phone].    ATENCIÓN: Si habla español, tiene a aparicio disposición servicios gratuitos de asistencia lingüística. Llame al [phone].    We comply with applicable federal civil rights laws and [place] laws. We do not discriminate on the basis of race, color, national origin, age, disability sex, sexual orientation or gender identity.            Thank you!     Thank you for choosing [facility] AND [facility]  for your care. Our goal is always to provide you  with excellent care. Hearing back from our patients is one way we can continue to improve our services. Please take a few minutes to complete the written survey that you may receive in the mail after your visit with us. Thank you!             Your Updated Medication List - Protect others around you: Learn how to safely use, store and throw away your medicines at www.disposemymeds.org.          This list is accurate as of: 9/21/17  4:15 PM.  Always use your most recent med list.                   Brand Name Dispense Instructions for use Diagnosis    BACLOFEN PO           LISINOPRIL PO           oxyCODONE 5 MG IR tablet    ROXICODONE    20 tablet    Take 1-2 tablets (5-10 mg) by mouth every 3 hours as needed for pain or other (Moderate to Severe)    Puncture wound of abdomen, initial encounter

## 2017-09-21 NOTE — PROGRESS NOTES
Lemtrada Infusion Nursing Note:  Linda Grover presents today for Lemtrada.    Patient seen by provider today: No   present during visit today: Not Applicable.    Note: No concerns or changes to report. Face appears quite red, maybe from Solumedrol yesterday, however wife states he gets red easily. No other signs of reaction..    Intravenous Access:  Peripheral IV placed. PIV kept per pt request. Pt is coming back tomorrow for another Lemtrada infusion. Coban placed and secured with spandage. Normal saline flush after the infusion.    Pre infusion Assessment:    1. Is patient authorized and have they received the Lemtrada treatment and infusion reactions patient guide? YES.  2. If ordered, has patient taken pre-medications? YES.  3. Does patient have any of the following signs of infection: fever, cough, sore throat, wounds, UTI symptoms? NO.  4. Is patient taking any oral, IV or topical anti-infective medication? YES.  5. Has patient been in contact with anyone with TB? NO.  6. Has patient traveled outside the country in the last 21 days? NO.  7. Has patient had a flu shot or vaccine in the last 6 weeks? NO  8. Plan~ A. Therapy is appropriate, will proceed with treatment today?                                        YES.      B. Medication will be held today and patient will be re-assessed at a later                      Date: NO.    Lemtrada - REMS requirements:    9. Lemtrada REMS infusion checklist reviewed and proper information documented: YES.  10. Lemtrada REMS infusion checklist completed online daily and submitted after FINAL infusion: YES.  11. Post-infusion instructions reviewed and given to patient: YES.  15. Adverse reactions reported to: N/A.      Post Infusion Assessment:  Patient tolerated infusion without incident.  Patient observed for 120 minutes post Lemtrada per protocol.  Blood return noted pre and post infusion.  Site patent and intact, free from redness, edema or discomfort.  No  evidence of extravasations.    Discharge Plan:   Discharge instructions reviewed with: Patient and Spouse.  Patient and/or family verbalized understanding of discharge instructions and all questions answered.  Copy of AVS reviewed with patient and/or family.  Patient will return 9/22/2017 for next appointment.  Patient discharged in stable condition accompanied by: self and wife.  Departure Mode: Ambulatory.    GUTIERREZ Ortiz RN

## 2017-09-22 ENCOUNTER — INFUSION THERAPY VISIT (OUTPATIENT)
Dept: INFUSION THERAPY | Facility: CLINIC | Age: 62
End: 2017-09-22
Attending: PSYCHIATRY & NEUROLOGY
Payer: COMMERCIAL

## 2017-09-22 VITALS
DIASTOLIC BLOOD PRESSURE: 76 MMHG | RESPIRATION RATE: 16 BRPM | SYSTOLIC BLOOD PRESSURE: 133 MMHG | TEMPERATURE: 97.8 F | HEART RATE: 71 BPM

## 2017-09-22 DIAGNOSIS — G35 MULTIPLE SCLEROSIS (H): Primary | ICD-10-CM

## 2017-09-22 PROCEDURE — 25000132 ZZH RX MED GY IP 250 OP 250 PS 637: Performed by: PSYCHIATRY & NEUROLOGY

## 2017-09-22 PROCEDURE — 96415 CHEMO IV INFUSION ADDL HR: CPT

## 2017-09-22 PROCEDURE — 96367 TX/PROPH/DG ADDL SEQ IV INF: CPT

## 2017-09-22 PROCEDURE — 96413 CHEMO IV INFUSION 1 HR: CPT

## 2017-09-22 PROCEDURE — 25000128 H RX IP 250 OP 636: Performed by: PSYCHIATRY & NEUROLOGY

## 2017-09-22 RX ORDER — ACETAMINOPHEN 500 MG
1000 TABLET ORAL EVERY 4 HOURS PRN
Status: CANCELLED | OUTPATIENT
Start: 2017-09-22

## 2017-09-22 RX ORDER — DIPHENHYDRAMINE HYDROCHLORIDE 50 MG/ML
25 INJECTION INTRAMUSCULAR; INTRAVENOUS
Status: CANCELLED | OUTPATIENT
Start: 2017-09-22

## 2017-09-22 RX ORDER — HYDROXYZINE HYDROCHLORIDE 50 MG/1
50 TABLET, FILM COATED ORAL ONCE
Status: CANCELLED
Start: 2017-09-22 | End: 2017-09-22

## 2017-09-22 RX ORDER — MEPERIDINE HYDROCHLORIDE 50 MG/1
50 TABLET ORAL ONCE
Status: COMPLETED | OUTPATIENT
Start: 2017-09-22 | End: 2017-09-22

## 2017-09-22 RX ORDER — MEPERIDINE HYDROCHLORIDE 50 MG/1
50 TABLET ORAL ONCE
Status: CANCELLED
Start: 2017-09-22 | End: 2017-09-22

## 2017-09-22 RX ORDER — MEPERIDINE HYDROCHLORIDE 50 MG/1
50 TABLET ORAL EVERY 4 HOURS PRN
Status: CANCELLED
Start: 2017-09-22

## 2017-09-22 RX ORDER — EPINEPHRINE 0.3 MG/.3ML
0.3 INJECTION SUBCUTANEOUS
Status: CANCELLED | OUTPATIENT
Start: 2017-09-22

## 2017-09-22 RX ORDER — ACETAMINOPHEN 500 MG
1000 TABLET ORAL ONCE
Status: CANCELLED | OUTPATIENT
Start: 2017-09-22

## 2017-09-22 RX ORDER — ACETAMINOPHEN 500 MG
1000 TABLET ORAL ONCE
Status: COMPLETED | OUTPATIENT
Start: 2017-09-22 | End: 2017-09-22

## 2017-09-22 RX ORDER — DIPHENHYDRAMINE HCL 25 MG
25 CAPSULE ORAL EVERY 4 HOURS PRN
Status: CANCELLED
Start: 2017-09-22

## 2017-09-22 RX ORDER — PROMETHAZINE HYDROCHLORIDE 25 MG/ML
25 INJECTION, SOLUTION INTRAMUSCULAR; INTRAVENOUS
Status: CANCELLED
Start: 2017-09-22

## 2017-09-22 RX ORDER — IBUPROFEN 200 MG
800 TABLET ORAL EVERY 6 HOURS PRN
Status: CANCELLED | OUTPATIENT
Start: 2017-09-22

## 2017-09-22 RX ORDER — HYDROXYZINE HYDROCHLORIDE 50 MG/1
50 TABLET, FILM COATED ORAL ONCE
Status: COMPLETED | OUTPATIENT
Start: 2017-09-22 | End: 2017-09-22

## 2017-09-22 RX ORDER — HYDROXYZINE HYDROCHLORIDE 50 MG/1
50 TABLET, FILM COATED ORAL
Status: CANCELLED
Start: 2017-09-22

## 2017-09-22 RX ADMIN — SODIUM CHLORIDE 1000 MG: 0.9 INJECTION, SOLUTION INTRAVENOUS at 08:19

## 2017-09-22 RX ADMIN — RENAGEL 12 MG: 400 TABLET ORAL at 09:19

## 2017-09-22 RX ADMIN — HYDROXYZINE HYDROCHLORIDE 50 MG: 50 TABLET, FILM COATED ORAL at 08:20

## 2017-09-22 RX ADMIN — MEPERIDINE HYDROCHLORIDE 50 MG: 50 TABLET ORAL at 08:20

## 2017-09-22 RX ADMIN — ACETAMINOPHEN 1000 MG: 500 TABLET ORAL at 08:19

## 2017-09-22 ASSESSMENT — PAIN SCALES - GENERAL: PAINLEVEL: NO PAIN (0)

## 2017-09-22 NOTE — PROGRESS NOTES
Infusion Nursing Note:  Linda Grover presents today for Lemtrada 3/5.    Patient seen by provider today: No   present during visit today: Not Applicable.    Note: N/A.    Intravenous Access:  Peripheral IV placed 9/21/2017; flushes without difficulty. No blood return present.    Treatment Conditions:    Lemtrada Infusion Nursing Note:    Pre infusion Assessment:    1. Is patient authorized and have they received the Lemtrada treatment and infusion reactions patient guide? YES.  2. If ordered, has patient taken pre-medications? Took  Zantac prior to arrival.  3. Does patient have any of the following signs of infection: fever, cough, sore throat, wounds, UTI symptoms? NO.  4. Is patient taking any oral, IV or topical anti-infective medication? Oral Acyclovir  5. Has patient been in contact with anyone with TB? NO.  6. Has patient traveled outside the country in the last 21 days? NO.  7. Has patient had a flu shot or vaccine in the last 6 weeks? NO  8. Plan~ A. Therapy is appropriate, will proceed with treatment today?                                        YES.      B. Medication will be held today and patient will be re-assessed at a later                      Date: NO.    Lemtrada - REMS requirements:    9. Lemtrada REMS infusion checklist reviewed and proper information documented: YES.  10. Lemtrada REMS infusion checklist completed online daily and submitted after FINAL infusion: YES.  11. Post-infusion instructions reviewed and given to patient: YES.  15. Adverse reactions reported to: N/A.      Post Infusion Assessment:  Patient tolerated infusion without incident.  Patient observed for 120 minutes post Lemtrada per protocol.  Site patent and intact, free from redness, edema or discomfort.  No evidence of extravasations.  Access discontinued per protocol.    Discharge Plan:   Discharge instructions reviewed with: Patient.  Patient and/or family verbalized understanding of discharge instructions and  all questions answered.  Copy of AVS reviewed with patient and/or family.  Patient will return 9/25/2017 for next appointment.  Patient discharged in stable condition accompanied by: self and wife.  Departure Mode: Ambulatory.    Odalis Tarango RN

## 2017-09-22 NOTE — MR AVS SNAPSHOT
After Visit Summary   9/22/2017    Linda Grover    MRN: [id number]           Patient Information     Date Of Birth          1955        Visit Information        Provider Department      9/22/2017 7:30 AM  INFUSION CHAIR 9 [facility] Infusion Center        Today's Diagnoses     Multiple sclerosis (H)    -  1      Care Instructions    Thank you for choosing [facility] & [facility] [place]. The following information is a summary of your appointment as well as important reminders:      Last dose Tylenol was 820am  Last dose Ibuprofen was N/A  Last dose Benadryl was N/A    Care for yourself after your infusions:  1.  Notify your doctor or seek appropriate medical attention as soon as possible if you experience any discomfort or anything that feels out of the ordinary--including swelling in your mouth or throat, trouble breathing, weakness; fast, slow or irregular heartbeat, chest pain or rash.    2.  Please continue all post infusion medications as prescribed by your healthcare provider.  3.  Stay hydrated.  Be sure to drink plenty of water.  4.  Rest.  You may feel tired, so take it easy and don't overexert yourself.  5.  Eat your regular meals and usual portion sizes as tolerated.   6.  Be mindful of infections and ways to help reduce your risk.      We look forward in seeing you on your next appointment here at [facility] [place].  Please don t hesitate to call us at [phone] to reschedule any of your appointments or to speak with one of our registered nurses.  It was a pleasure taking care of you today.    Sincerely,    [facility] Physicians  [facility] Infusion Center  [address] [facility] [address] [place] [address]  Phone: [phone]          Follow-ups after your visit        Your next 10 appointments already scheduled     Sep 25, 2017  8:00 AM CDT   Level 7 with  INFUSION CHAIR 17  "  [facility] ([facility])    [facility] [place]  [address]  [place] [address] [phone]            Sep 26, 2017  8:00 AM CDT   Level 7 with  INFUSION CHAIR 4   [facility] ([facility])    [facility] [place]  [address]  [place] [address] [phone]              Who to contact     If you have questions or need follow up information about today's clinic visit or your schedule please contact [facility] AND INFUSION CENTER directly at [phone].  Normal or non-critical lab and imaging results will be communicated to you by Kaly[employer] Pharmaceuticalshart, letter or phone within 4 business days after the clinic has received the results. If you do not hear from us within 7 days, please contact the clinic through K[employer] Pharm[employer]shart or phone. If you have a critical or abnormal lab result, we will notify you by phone as soon as possible.  Submit refill requests through NiftyThrifty or call your pharmacy and they will forward the refill request to us. Please allow 3 business days for your refill to be completed.          Additional Information About Your Visit        Kalyra PharmaceuticalsharCovagen Information     NiftyThrifty lets you send messages to your doctor, view your test results, renew your prescriptions, schedule appointments and more. To sign up, go to [email or web address] . Click on \"Log in\" on the left side of the screen, which will take you to the Welcome page. Then click on \"Sign up Now\" on the right side of the page.     You will be asked to enter the access code listed below, as well as some personal information. Please follow the directions to create your username and password.     Your access code is: [email or web address]  Expires: 2017  2:56 PM     Your access code will  in 90 days. If you need help or a new code, please call your [place] clinic or [phone].        Care EveryWhere " ID     This is your Care EveryWhere ID. This could be used by other organizations to access your [place] medical records  [id number]        Your Vitals Were     Pulse Temperature Respirations             61 97.8  F (36.6  C) (Oral) 18          Blood Pressure from Last 3 Encounters:   09/22/17 136/82   09/21/17 156/75   09/20/17 150/81    Weight from Last 3 Encounters:   11/25/15 104.3 kg (230 lb)   01/19/15 99.8 kg (220 lb)              Today, you had the following     No orders found for display       Primary Care Provider Office Phone # Fax #    Marialuisa Beavers [phone]       [facility] [place] [address] [place] [address]        Equal Access to Services     SIIDRO SEVILLA : Hadii austin bravo hadasho Soomaali, waaxda luqadaha, qaybta kaalmada adeegyada, waxay idiin hayjerald guevara . So [facility] [phone].    ATENCIÓN: Si habla español, tiene a aparicio disposición servicios gratuitos de asistencia lingüística. Llame al [phone].    We comply with applicable federal civil rights laws and [place] laws. We do not discriminate on the basis of race, color, national origin, age, disability sex, sexual orientation or gender identity.            Thank you!     Thank you for choosing [facility] AND [facility] C[facility]  for your care. Our goal is always to provide you with excellent care. Hearing back from our patients is one way we can continue to improve our services. Please take a few minutes to complete the written survey that you may receive in the mail after your visit with us. Thank you!             Your Updated Medication List - Protect others around you: Learn how to safely use, store and throw away your medicines at www.disposemymeds.org.          This list is accurate as of: 9/22/17  3:04 PM.  Always use your most recent med list.                   Brand Name Dispense Instructions for use Diagnosis    BACLOFEN PO           LISINOPRIL PO           oxyCODONE 5  MG IR tablet    ROXICODONE    20 tablet    Take 1-2 tablets (5-10 mg) by mouth every 3 hours as needed for pain or other (Moderate to Severe)    Puncture wound of abdomen, initial encounter

## 2017-09-22 NOTE — PATIENT INSTRUCTIONS
Thank you for choosing [facility]fusion Center. The following information is a summary of your appointment as well as important reminders:      Last dose Tylenol was 820am  Last dose Ibuprofen was N/A  Last dose Benadryl was N/A    Care for yourself after your infusions:  1.  Notify your doctor or seek appropriate medical attention as soon as possible if you experience any discomfort or anything that feels out of the ordinary--including swelling in your mouth or throat, trouble breathing, weakness; fast, slow or irregular heartbeat, chest pain or rash.    2.  Please continue all post infusion medications as prescribed by your healthcare provider.  3.  Stay hydrated.  Be sure to drink plenty of water.  4.  Rest.  You may feel tired, so take it easy and don't overexert yourself.  5.  Eat your regular meals and usual portion sizes as tolerated.   6.  Be mindful of infections and ways to help reduce your risk.      We look forward in seeing you on your next appointment here at [facility] and Infusion Center.  Please don t hesitate to call us at [phone] to reschedule any of your appointments or to speak with one of our registered nurses.  It was a pleasure taking care of you today.    Sincerely,    [facility] Phys[facility]  [facility]  [address]  [place] [address]  Phone: [phone]

## 2017-09-25 ENCOUNTER — INFUSION THERAPY VISIT (OUTPATIENT)
Dept: INFUSION THERAPY | Facility: CLINIC | Age: 62
End: 2017-09-25
Attending: PSYCHIATRY & NEUROLOGY
Payer: COMMERCIAL

## 2017-09-25 VITALS
RESPIRATION RATE: 18 BRPM | TEMPERATURE: 98.1 F | HEART RATE: 67 BPM | OXYGEN SATURATION: 95 % | DIASTOLIC BLOOD PRESSURE: 80 MMHG | SYSTOLIC BLOOD PRESSURE: 139 MMHG

## 2017-09-25 DIAGNOSIS — G35 MULTIPLE SCLEROSIS (H): Primary | ICD-10-CM

## 2017-09-25 PROCEDURE — 96367 TX/PROPH/DG ADDL SEQ IV INF: CPT

## 2017-09-25 PROCEDURE — 96413 CHEMO IV INFUSION 1 HR: CPT

## 2017-09-25 PROCEDURE — 25000128 H RX IP 250 OP 636: Performed by: PSYCHIATRY & NEUROLOGY

## 2017-09-25 PROCEDURE — 96415 CHEMO IV INFUSION ADDL HR: CPT

## 2017-09-25 PROCEDURE — 25000132 ZZH RX MED GY IP 250 OP 250 PS 637: Performed by: PSYCHIATRY & NEUROLOGY

## 2017-09-25 RX ORDER — HYDROXYZINE HYDROCHLORIDE 50 MG/1
50 TABLET, FILM COATED ORAL ONCE
Status: COMPLETED | OUTPATIENT
Start: 2017-09-25 | End: 2017-09-25

## 2017-09-25 RX ORDER — ACETAMINOPHEN 500 MG
1000 TABLET ORAL ONCE
Status: CANCELLED | OUTPATIENT
Start: 2017-09-25

## 2017-09-25 RX ORDER — PROMETHAZINE HYDROCHLORIDE 25 MG/ML
25 INJECTION, SOLUTION INTRAMUSCULAR; INTRAVENOUS
Status: CANCELLED
Start: 2017-09-25

## 2017-09-25 RX ORDER — HYDROXYZINE HYDROCHLORIDE 50 MG/1
50 TABLET, FILM COATED ORAL ONCE
Status: CANCELLED
Start: 2017-09-25 | End: 2017-09-25

## 2017-09-25 RX ORDER — VALACYCLOVIR HYDROCHLORIDE 500 MG/1
500 TABLET, FILM COATED ORAL 2 TIMES DAILY
COMMUNITY
End: 2022-11-21

## 2017-09-25 RX ORDER — MEPERIDINE HYDROCHLORIDE 50 MG/1
50 TABLET ORAL ONCE
Status: COMPLETED | OUTPATIENT
Start: 2017-09-25 | End: 2017-09-25

## 2017-09-25 RX ORDER — ACETAMINOPHEN 500 MG
1000 TABLET ORAL ONCE
Status: COMPLETED | OUTPATIENT
Start: 2017-09-25 | End: 2017-09-25

## 2017-09-25 RX ORDER — EPINEPHRINE 0.3 MG/.3ML
0.3 INJECTION SUBCUTANEOUS
Status: CANCELLED | OUTPATIENT
Start: 2017-09-25

## 2017-09-25 RX ORDER — ACETAMINOPHEN 500 MG
1000 TABLET ORAL EVERY 4 HOURS PRN
Status: CANCELLED | OUTPATIENT
Start: 2017-09-25

## 2017-09-25 RX ORDER — MEPERIDINE HYDROCHLORIDE 50 MG/1
50 TABLET ORAL ONCE
Status: CANCELLED
Start: 2017-09-25 | End: 2017-09-25

## 2017-09-25 RX ORDER — MEPERIDINE HYDROCHLORIDE 50 MG/1
50 TABLET ORAL DAILY
COMMUNITY
End: 2018-02-14

## 2017-09-25 RX ORDER — DIPHENHYDRAMINE HYDROCHLORIDE 50 MG/ML
25 INJECTION INTRAMUSCULAR; INTRAVENOUS
Status: CANCELLED | OUTPATIENT
Start: 2017-09-25

## 2017-09-25 RX ORDER — HYDROXYZINE HYDROCHLORIDE 50 MG/1
50 TABLET, FILM COATED ORAL
Status: CANCELLED
Start: 2017-09-25

## 2017-09-25 RX ORDER — IBUPROFEN 200 MG
800 TABLET ORAL EVERY 6 HOURS PRN
Status: CANCELLED | OUTPATIENT
Start: 2017-09-25

## 2017-09-25 RX ORDER — MEPERIDINE HYDROCHLORIDE 50 MG/1
50 TABLET ORAL EVERY 4 HOURS PRN
Status: CANCELLED
Start: 2017-09-25

## 2017-09-25 RX ORDER — DIPHENHYDRAMINE HCL 25 MG
25 CAPSULE ORAL EVERY 4 HOURS PRN
Status: CANCELLED
Start: 2017-09-25

## 2017-09-25 RX ADMIN — RENAGEL 12 MG: 400 TABLET ORAL at 09:24

## 2017-09-25 RX ADMIN — RANITIDINE 150 MG: 150 TABLET, FILM COATED ORAL at 08:48

## 2017-09-25 RX ADMIN — HYDROXYZINE HYDROCHLORIDE 50 MG: 50 TABLET, FILM COATED ORAL at 08:48

## 2017-09-25 RX ADMIN — SODIUM CHLORIDE 500 MG: 0.9 INJECTION, SOLUTION INTRAVENOUS at 08:49

## 2017-09-25 RX ADMIN — ACETAMINOPHEN 1000 MG: 500 TABLET ORAL at 08:47

## 2017-09-25 RX ADMIN — MEPERIDINE HYDROCHLORIDE 50 MG: 50 TABLET ORAL at 08:49

## 2017-09-25 ASSESSMENT — PAIN SCALES - GENERAL: PAINLEVEL: NO PAIN (0)

## 2017-09-25 NOTE — PROGRESS NOTES
Lemtrada Infusion Nursing Note:  Linda Grover presents today for D4/5 Lemtrada infusion.    Patient seen by provider today: No   present during visit today: Not Applicable.    Note: No concern for today per pt. No change from the last infusion.    Intravenous Access:  Peripheral IV placed.    Pre infusion Assessment:    1. Is patient authorized and have they received the Lemtrada treatment and infusion reactions patient guide? YES.  2. If ordered, has patient taken pre-medications? YES. Patient took ranitidine prior to arrival (6:30am). No need to repeat dose 30 minutes prior to lemtrada per Megan Mariano, PharmD.  3. Does patient have any of the following signs of infection: fever, cough, sore throat, wounds, UTI symptoms? NO.  4. Is patient taking any oral, IV or topical anti-infective medication? YES. On zyclovir per Dr. Judge  5. Has patient been in contact with anyone with TB? NO.  6. Has patient traveled outside the country in the last 21 days? NO.  7. Has patient had a flu shot or vaccine in the last 6 weeks? NO  8. Plan~ A. Therapy is appropriate, will proceed with treatment today? YES.      B. Medication will be held today and patient will be re-assessed at a later date: Not Applicable.    Lemtrada - REMS requirements:    9. Lemtrada REMS infusion checklist reviewed and proper information documented: YES.  10. Lemtrada REMS infusion checklist completed online daily and submitted after FINAL infusion: YES.  11. Post-infusion instructions reviewed and given to patient: YES.  15. Adverse reactions reported to: N/A.      Post Infusion Assessment:  Patient tolerated infusion without incident.  Patient observed for 120 minutes post Lemtrada per protocol.   PIV kept for today. Pt will have another Lemtrada infusion tomorrow. Flushed will with normal saline.       Discharge Plan:   Discharge instructions reviewed with: Patient and spouse.  Patient and/or family verbalized understanding of discharge  instructions and all questions answered.  Copy of AVS reviewed with patient and/or family.  Patient will return 9/26/2017 for next appointment.  Patient discharged in stable condition accompanied by: self and wife.  Departure Mode: Ambulatory.    GUTIERREZ Addison RN (Discharge)

## 2017-09-25 NOTE — PATIENT INSTRUCTIONS
Thank you for choosing [facility] Infusion Center. The following information is a summary of your appointment as well as important reminders:      Last dose Tylenol was 0847H.  Last dose Ibuprofen was not given.  Last dose Benadryl was not given.    Care for yourself after your infusions:  1.  Notify your doctor or seek appropriate medical attention as soon as possible if you experience any discomfort or anything that feels out of the ordinary--including swelling in your mouth or throat, trouble breathing, weakness; fast, slow or irregular heartbeat, chest pain or rash.    2.  Please continue all post infusion medications as prescribed by your healthcare provider.  3.  Stay hydrated.  Be sure to drink plenty of water.  4.  Rest.  You may feel tired, so take it easy and don't overexert yourself.  5.  Eat your regular meals and usual portion sizes as tolerated.   6.  Be mindful of infections and ways to help reduce your risk.      We look forward in seeing you on your next appointment here at [facility] and Infusion Center.  Please don t hesitate to call us at [phone] to reschedule any of your appointments or to speak with one of our registered nurses.  It was a pleasure taking care of you today.    Sincerely,    [facility]  [facility] Infusion Center  [address]  [place] [address]  Phone: [phone]

## 2017-09-25 NOTE — MR AVS SNAPSHOT
After Visit Summary   9/25/2017    Linda Grover    MRN: [id number]           Patient Information     Date Of Birth          1955        Visit Information        Provider Department      9/25/2017 8:00 AM  INFUSION CHAIR 17 [facility]        Today's Diagnoses     Multiple sclerosis (H)    -  1      Care Instructions    Thank you for choosing [facility] [place]. The following information is a summary of your appointment as well as important reminders:      Last dose Tylenol was 0847H.  Last dose Ibuprofen was not given.  Last dose Benadryl was not given.    Care for yourself after your infusions:  1.  Notify your doctor or seek appropriate medical attention as soon as possible if you experience any discomfort or anything that feels out of the ordinary--including swelling in your mouth or throat, trouble breathing, weakness; fast, slow or irregular heartbeat, chest pain or rash.    2.  Please continue all post infusion medications as prescribed by your healthcare provider.  3.  Stay hydrated.  Be sure to drink plenty of water.  4.  Rest.  You may feel tired, so take it easy and don't overexert yourself.  5.  Eat your regular meals and usual portion sizes as tolerated.   6.  Be mindful of infections and ways to help reduce your risk.      We look forward in seeing you on your next appointment here at [facility] [place].  Please don t hesitate to call us at [phone] to reschedule any of your appointments or to speak with one of our registered nurses.  It was a pleasure taking care of you today.    Sincerely,    [facility]  [facility]  [address] [place] [address]  Phone: [phone]            Follow-ups after your visit        Your next 10 appointments already scheduled     Sep 26, 2017  8:00 AM CDT   Level 7 with JOANNE  "INFUSION CHAIR 4   [facility] ([facility])    [facility] [place]  [address]  [place] [address] [phone]              Who to contact     If you have questions or need follow up information about today's clinic visit or your schedule please contact [facility] directly at [phone].  Normal or non-critical lab and imaging results will be communicated to you by [employer], letter or phone within 4 business days after the clinic has received the results. If you do not hear from us within 7 days, please contact the clinic through "[employer]t or phone. If you have a critical or abnormal lab result, we will notify you by phone as soon as possible.  Submit refill requests through [employer] or call your pharmacy and they will forward the refill request to us. Please allow 3 business days for your refill to be completed.          Additional Information About Your Visit        WorkVoices Information     [employer] lets you send messages to your doctor, view your test results, renew your prescriptions, schedule appointments and more. To sign up, go to [email or web address] . Click on \"Log in\" on the left side of the screen, which will take you to the Welcome page. Then click on \"Sign up Now\" on the right side of the page.     You will be asked to enter the access code listed below, as well as some personal information. Please follow the directions to create your username and password.     Your access code is: [email or web address]  Expires: 2017  2:56 PM     Your access code will  in 90 days. If you need help or a new code, please call your [place] clinic or [phone].        Care EveryWhere ID     This is your Care EveryWhere ID. This could be used by other organizations to access your [place] medical records  [id number]        Your Vitals Were     Pulse Temperature Respirations Pulse Oximetry          72 98.2 "  F (36.8  C) (Oral) 16 96%         Blood Pressure from Last 3 Encounters:   09/25/17 145/78   09/22/17 133/76   09/21/17 156/75    Weight from Last 3 Encounters:   11/25/15 104.3 kg (230 lb)   01/19/15 99.8 kg (220 lb)              Today, you had the following     No orders found for display       Primary Care Provider Office Phone # Fax #    Marialuisa MISHA Beavers [phone]       [facility] [address] [place] [address]        Equal Access to Services     [facility]: Hadii aad ku hadasho Soomaali, waaxda luqadaha, qaybta kaalmada adeegyada, ronal guevara . So [facility] [phone].    ATENCIÓN: Si habla español, tiene a aparicio disposición servicios gratuitos de asistencia lingüística. [facility] [phone].    We comply with applicable federal civil rights laws and [place] laws. We do not discriminate on the basis of race, color, national origin, age, disability sex, sexual orientation or gender identity.            Thank you!     Thank you for choosing [facility] AND [facility]  for your care. Our goal is always to provide you with excellent care. Hearing back from our patients is one way we can continue to improve our services. Please take a few minutes to complete the written survey that you may receive in the mail after your visit with us. Thank you!             Your Updated Medication List - Protect others around you: Learn how to safely use, store and throw away your medicines at www.disposemymeds.org.          This list is accurate as of: 9/25/17  3:24 PM.  Always use your most recent med list.                   Brand Name Dispense Instructions for use Diagnosis    BACLOFEN PO           HYDROXYZINE HCL PO      Take 50 mg by mouth As directed per Dr. Judge        LISINOPRIL PO           meperidine 50 MG tablet    DEMEROL     Take 50 mg by mouth daily As directed per Dr. Judge        oxyCODONE 5 MG IR tablet    ROXICODONE     20 tablet    Take 1-2 tablets (5-10 mg) by mouth every 3 hours as needed for pain or other (Moderate to Severe)    Puncture wound of abdomen, initial encounter       RANITIDINE HCL PO      Take 150 mg by mouth daily        valACYclovir 500 MG tablet    VALTREX     Take 500 mg by mouth daily

## 2017-09-26 ENCOUNTER — INFUSION THERAPY VISIT (OUTPATIENT)
Dept: INFUSION THERAPY | Facility: CLINIC | Age: 62
End: 2017-09-26
Attending: PSYCHIATRY & NEUROLOGY
Payer: COMMERCIAL

## 2017-09-26 VITALS
HEART RATE: 68 BPM | RESPIRATION RATE: 16 BRPM | TEMPERATURE: 98.3 F | DIASTOLIC BLOOD PRESSURE: 82 MMHG | SYSTOLIC BLOOD PRESSURE: 147 MMHG

## 2017-09-26 DIAGNOSIS — G35 MULTIPLE SCLEROSIS (H): Primary | ICD-10-CM

## 2017-09-26 PROCEDURE — 25000128 H RX IP 250 OP 636: Performed by: PSYCHIATRY & NEUROLOGY

## 2017-09-26 PROCEDURE — 96367 TX/PROPH/DG ADDL SEQ IV INF: CPT

## 2017-09-26 PROCEDURE — 96413 CHEMO IV INFUSION 1 HR: CPT

## 2017-09-26 PROCEDURE — 25000132 ZZH RX MED GY IP 250 OP 250 PS 637: Performed by: PSYCHIATRY & NEUROLOGY

## 2017-09-26 PROCEDURE — 96415 CHEMO IV INFUSION ADDL HR: CPT

## 2017-09-26 RX ORDER — DIPHENHYDRAMINE HYDROCHLORIDE 50 MG/ML
25 INJECTION INTRAMUSCULAR; INTRAVENOUS
Status: CANCELLED | OUTPATIENT
Start: 2017-09-26

## 2017-09-26 RX ORDER — EPINEPHRINE 0.3 MG/.3ML
0.3 INJECTION SUBCUTANEOUS
Status: CANCELLED | OUTPATIENT
Start: 2017-09-26

## 2017-09-26 RX ORDER — DIPHENHYDRAMINE HCL 25 MG
25 CAPSULE ORAL EVERY 4 HOURS PRN
Status: CANCELLED
Start: 2017-09-26

## 2017-09-26 RX ORDER — ACETAMINOPHEN 500 MG
1000 TABLET ORAL ONCE
Status: COMPLETED | OUTPATIENT
Start: 2017-09-26 | End: 2017-09-26

## 2017-09-26 RX ORDER — MEPERIDINE HYDROCHLORIDE 50 MG/1
50 TABLET ORAL ONCE
Status: CANCELLED
Start: 2017-09-26 | End: 2017-09-26

## 2017-09-26 RX ORDER — ACETAMINOPHEN 500 MG
1000 TABLET ORAL ONCE
Status: CANCELLED | OUTPATIENT
Start: 2017-09-26

## 2017-09-26 RX ORDER — MEPERIDINE HYDROCHLORIDE 50 MG/1
50 TABLET ORAL EVERY 4 HOURS PRN
Status: CANCELLED
Start: 2017-09-26

## 2017-09-26 RX ORDER — HYDROXYZINE HYDROCHLORIDE 50 MG/1
50 TABLET, FILM COATED ORAL ONCE
Status: CANCELLED
Start: 2017-09-26 | End: 2017-09-26

## 2017-09-26 RX ORDER — HYDROXYZINE HYDROCHLORIDE 50 MG/1
50 TABLET, FILM COATED ORAL
Status: CANCELLED
Start: 2017-09-26

## 2017-09-26 RX ORDER — HYDROXYZINE HYDROCHLORIDE 50 MG/1
50 TABLET, FILM COATED ORAL ONCE
Status: DISCONTINUED | OUTPATIENT
Start: 2017-09-26 | End: 2017-09-26 | Stop reason: HOSPADM

## 2017-09-26 RX ORDER — ACETAMINOPHEN 500 MG
1000 TABLET ORAL EVERY 4 HOURS PRN
Status: CANCELLED | OUTPATIENT
Start: 2017-09-26

## 2017-09-26 RX ORDER — PROMETHAZINE HYDROCHLORIDE 25 MG/ML
25 INJECTION, SOLUTION INTRAMUSCULAR; INTRAVENOUS
Status: CANCELLED
Start: 2017-09-26

## 2017-09-26 RX ORDER — IBUPROFEN 200 MG
800 TABLET ORAL EVERY 6 HOURS PRN
Status: CANCELLED | OUTPATIENT
Start: 2017-09-26

## 2017-09-26 RX ORDER — MEPERIDINE HYDROCHLORIDE 50 MG/1
50 TABLET ORAL ONCE
Status: DISCONTINUED | OUTPATIENT
Start: 2017-09-26 | End: 2017-09-26 | Stop reason: HOSPADM

## 2017-09-26 RX ADMIN — SODIUM CHLORIDE 500 MG: 0.9 INJECTION, SOLUTION INTRAVENOUS at 08:35

## 2017-09-26 RX ADMIN — ACETAMINOPHEN 1000 MG: 500 TABLET ORAL at 09:07

## 2017-09-26 RX ADMIN — RENAGEL 12 MG: 400 TABLET ORAL at 09:41

## 2017-09-26 ASSESSMENT — PAIN SCALES - GENERAL: PAINLEVEL: NO PAIN (0)

## 2017-09-26 NOTE — MR AVS SNAPSHOT
"              After Visit Summary   2017    Linda Grover    MRN: [id number]           Patient Information     Date Of Birth          1955        Visit Information        Provider Department      2017 8:00 AM  INFUSION CHAIR 4 [facility]        Today's Diagnoses     Multiple sclerosis (H)    -  1       Follow-ups after your visit        Who to contact     If you have questions or need follow up information about today's clinic visit or your schedule please contact [facility] directly at [phone].  Normal or non-critical lab and imaging results will be communicated to you by rollApphart, letter or phone within 4 business days after the clinic has received the results. If you do not hear from us within 7 days, please contact the clinic through [email or web address] or phone. If you have a critical or abnormal lab result, we will notify you by phone as soon as possible.  Submit refill requests through [email or web address] or call your pharmacy and they will forward the refill request to us. Please allow 3 business days for your refill to be completed.          Additional Information About Your Visit        MyChart Information     [email or web address] lets you send messages to your doctor, view your test results, renew your prescriptions, schedule appointments and more. To sign up, go to [email or web address] . Click on \"Log in\" on the left side of the screen, which will take you to the Welcome page. Then click on \"Sign up Now\" on the right side of the page.     You will be asked to enter the access code listed below, as well as some personal information. Please follow the directions to create your username and password.     Your access code is: [email or web address]  Expires: 2017  2:56 PM     Your access code will  in 90 days. If you need help or a new code, please call your [facility] or [phone].        Care EveryWhere ID     This is your Care EveryWhere ID. " This could be used by other organizations to access your [place] medical records  [id number]        Your Vitals Were     Pulse Temperature Respirations             68 98.3  F (36.8  C) (Oral) 16          Blood Pressure from Last 3 Encounters:   09/26/17 147/82   09/25/17 139/80   09/22/17 133/76    Weight from Last 3 Encounters:   11/25/15 104.3 kg (230 lb)   01/19/15 99.8 kg (220 lb)              Today, you had the following     No orders found for display       Primary Care Provider Office Phone # Fax #    Marialuisa CABRAL MD Nimesh [phone]       [facility] [address] [place] [address]        Equal Access to Services     [facility] : Hadii aad ku hadasho Soomaali, waaxda luqadaha, qaybta kaalmada adeegyada, waxay idiin hayjerald ospina. So [facility] [phone].    ATENCIÓN: Si habla español, tiene a aparicio disposición servicios gratuitos de asistencia lingüística. [facility] [phone].    We comply with applicable federal civil rights laws and [place] laws. We do not discriminate on the basis of race, color, national origin, age, disability sex, sexual orientation or gender identity.            Thank you!     Thank you for choosing [facility] AND [facility]  for your care. Our goal is always to provide you with excellent care. Hearing back from our patients is one way we can continue to improve our services. Please take a few minutes to complete the written survey that you may receive in the mail after your visit with us. Thank you!             Your Updated Medication List - Protect others around you: Learn how to safely use, store and throw away your medicines at www.disposemymeds.org.          This list is accurate as of: 9/26/17  3:44 PM.  Always use your most recent med list.                   Brand Name Dispense Instructions for use Diagnosis    BACLOFEN PO           HYDROXYZINE HCL PO      Take 50 mg by mouth As directed per Dr. Judge         LISINOPRIL PO           meperidine 50 MG tablet    DEMEROL     Take 50 mg by mouth daily As directed per Dr. Judge        oxyCODONE 5 MG IR tablet    ROXICODONE    20 tablet    Take 1-2 tablets (5-10 mg) by mouth every 3 hours as needed for pain or other (Moderate to Severe)    Puncture wound of abdomen, initial encounter       RANITIDINE HCL PO      Take 150 mg by mouth daily        valACYclovir 500 MG tablet    VALTREX     Take 500 mg by mouth daily

## 2017-09-26 NOTE — PROGRESS NOTES
Infusion Nursing Note:  Linda Grover presents today for Lemtrada day 5   Patient seen by provider today: No   present during visit today: Not Applicable.    Note: N/A.    Intravenous Access:  Peripheral IV placed.    Treatment Conditions: Lemtrada REMS checklist completed      Post Infusion Assessment:  Patient tolerated infusion without incident.  Patient observed for 120 minutes post Lemtrada per protocol.  Site patent and intact, free from redness, edema or discomfort.  No evidence of extravasations.  Access discontinued per protocol.    Discharge Plan:   Discharge instructions reviewed with: Patient.  Patient and/or family verbalized understanding of discharge instructions and all questions answered.  Copy of AVS reviewed with patient and/or family.  Patient will return as directed by MD for next appointment.  Patient discharged in stable condition accompanied by: self and wife.  Departure Mode: Ambulatory.    Belkys Tamayo RN

## 2018-02-14 ENCOUNTER — HOSPITAL ENCOUNTER (OUTPATIENT)
Facility: CLINIC | Age: 63
Setting detail: OBSERVATION
Discharge: HOME | End: 2018-02-16
Attending: EMERGENCY MEDICINE | Admitting: HOSPITALIST
Payer: COMMERCIAL

## 2018-02-14 ENCOUNTER — APPOINTMENT (OUTPATIENT)
Dept: ULTRASOUND IMAGING | Facility: CLINIC | Age: 63
End: 2018-02-14
Attending: EMERGENCY MEDICINE
Payer: COMMERCIAL

## 2018-02-14 ENCOUNTER — APPOINTMENT (OUTPATIENT)
Dept: CT IMAGING | Facility: CLINIC | Age: 63
End: 2018-02-14
Attending: EMERGENCY MEDICINE
Payer: COMMERCIAL

## 2018-02-14 DIAGNOSIS — K80.80 BILIARY CALCULUS OF OTHER SITE WITHOUT OBSTRUCTION: ICD-10-CM

## 2018-02-14 PROBLEM — R10.9 ABDOMINAL PAIN: Status: ACTIVE | Noted: 2018-02-14

## 2018-02-14 LAB
ALBUMIN SERPL-MCNC: 4 G/DL (ref 3.4–5)
ALBUMIN UR-MCNC: NEGATIVE MG/DL
ALP SERPL-CCNC: 108 U/L (ref 40–150)
ALT SERPL W P-5'-P-CCNC: 42 U/L (ref 0–70)
ANION GAP SERPL CALCULATED.3IONS-SCNC: 7 MMOL/L (ref 3–14)
APPEARANCE UR: CLEAR
AST SERPL W P-5'-P-CCNC: 27 U/L (ref 0–45)
BASOPHILS # BLD AUTO: 0.1 10E9/L (ref 0–0.2)
BASOPHILS NFR BLD AUTO: 0.7 %
BILIRUB SERPL-MCNC: 0.6 MG/DL (ref 0.2–1.3)
BILIRUB UR QL STRIP: NEGATIVE
BUN SERPL-MCNC: 9 MG/DL (ref 7–30)
CALCIUM SERPL-MCNC: 8.6 MG/DL (ref 8.5–10.1)
CHLORIDE SERPL-SCNC: 106 MMOL/L (ref 94–109)
CO2 SERPL-SCNC: 26 MMOL/L (ref 20–32)
COLOR UR AUTO: YELLOW
CREAT BLD-MCNC: 0.8 MG/DL (ref 0.66–1.25)
CREAT SERPL-MCNC: 0.84 MG/DL (ref 0.66–1.25)
DIFFERENTIAL METHOD BLD: ABNORMAL
EOSINOPHIL # BLD AUTO: 0.3 10E9/L (ref 0–0.7)
EOSINOPHIL NFR BLD AUTO: 4.2 %
ERYTHROCYTE [DISTWIDTH] IN BLOOD BY AUTOMATED COUNT: 15.4 % (ref 10–15)
GFR SERPL CREATININE-BSD FRML MDRD: >90 ML/MIN/1.7M2
GFR SERPL CREATININE-BSD FRML MDRD: >90 ML/MIN/1.7M2
GLUCOSE SERPL-MCNC: 92 MG/DL (ref 70–99)
GLUCOSE UR STRIP-MCNC: NEGATIVE MG/DL
HCT VFR BLD AUTO: 43.6 % (ref 40–53)
HGB BLD-MCNC: 14.5 G/DL (ref 13.3–17.7)
HGB UR QL STRIP: NEGATIVE
IMM GRANULOCYTES # BLD: 0 10E9/L (ref 0–0.4)
IMM GRANULOCYTES NFR BLD: 0.3 %
INTERPRETATION ECG - MUSE: NORMAL
KETONES UR STRIP-MCNC: NEGATIVE MG/DL
LEUKOCYTE ESTERASE UR QL STRIP: ABNORMAL
LIPASE SERPL-CCNC: 108 U/L (ref 73–393)
LYMPHOCYTES # BLD AUTO: 1 10E9/L (ref 0.8–5.3)
LYMPHOCYTES NFR BLD AUTO: 13.3 %
MCH RBC QN AUTO: 30 PG (ref 26.5–33)
MCHC RBC AUTO-ENTMCNC: 33.3 G/DL (ref 31.5–36.5)
MCV RBC AUTO: 90 FL (ref 78–100)
MONOCYTES # BLD AUTO: 0.5 10E9/L (ref 0–1.3)
MONOCYTES NFR BLD AUTO: 6.7 %
MUCOUS THREADS #/AREA URNS LPF: PRESENT /LPF
NEUTROPHILS # BLD AUTO: 5.7 10E9/L (ref 1.6–8.3)
NEUTROPHILS NFR BLD AUTO: 74.8 %
NITRATE UR QL: NEGATIVE
NRBC # BLD AUTO: 0 10*3/UL
NRBC BLD AUTO-RTO: 0 /100
PH UR STRIP: 6 PH (ref 5–7)
PLATELET # BLD AUTO: 245 10E9/L (ref 150–450)
POTASSIUM SERPL-SCNC: 3.8 MMOL/L (ref 3.4–5.3)
PROT SERPL-MCNC: 7.7 G/DL (ref 6.8–8.8)
RBC # BLD AUTO: 4.84 10E12/L (ref 4.4–5.9)
RBC #/AREA URNS AUTO: 1 /HPF (ref 0–2)
SODIUM SERPL-SCNC: 139 MMOL/L (ref 133–144)
SOURCE: ABNORMAL
SP GR UR STRIP: 1.01 (ref 1–1.03)
TROPONIN I BLD-MCNC: 0.02 UG/L (ref 0–0.1)
TROPONIN I SERPL-MCNC: <0.015 UG/L (ref 0–0.04)
UROBILINOGEN UR STRIP-MCNC: 0 MG/DL (ref 0–2)
WBC # BLD AUTO: 7.6 10E9/L (ref 4–11)
WBC #/AREA URNS AUTO: 1 /HPF (ref 0–2)

## 2018-02-14 PROCEDURE — 76705 ECHO EXAM OF ABDOMEN: CPT

## 2018-02-14 PROCEDURE — 25000128 H RX IP 250 OP 636

## 2018-02-14 PROCEDURE — 96375 TX/PRO/DX INJ NEW DRUG ADDON: CPT

## 2018-02-14 PROCEDURE — 25000128 H RX IP 250 OP 636: Performed by: EMERGENCY MEDICINE

## 2018-02-14 PROCEDURE — 99285 EMERGENCY DEPT VISIT HI MDM: CPT | Mod: 25

## 2018-02-14 PROCEDURE — 81001 URINALYSIS AUTO W/SCOPE: CPT | Performed by: EMERGENCY MEDICINE

## 2018-02-14 PROCEDURE — G0378 HOSPITAL OBSERVATION PER HR: HCPCS

## 2018-02-14 PROCEDURE — 25000128 H RX IP 250 OP 636: Performed by: PHYSICIAN ASSISTANT

## 2018-02-14 PROCEDURE — 96376 TX/PRO/DX INJ SAME DRUG ADON: CPT

## 2018-02-14 PROCEDURE — 96374 THER/PROPH/DIAG INJ IV PUSH: CPT | Mod: 59

## 2018-02-14 PROCEDURE — 84484 ASSAY OF TROPONIN QUANT: CPT

## 2018-02-14 PROCEDURE — 93005 ELECTROCARDIOGRAM TRACING: CPT

## 2018-02-14 PROCEDURE — 74177 CT ABD & PELVIS W/CONTRAST: CPT

## 2018-02-14 PROCEDURE — 99220 ZZC INITIAL OBSERVATION CARE,LEVL III: CPT | Performed by: PHYSICIAN ASSISTANT

## 2018-02-14 PROCEDURE — 80053 COMPREHEN METABOLIC PANEL: CPT | Performed by: EMERGENCY MEDICINE

## 2018-02-14 PROCEDURE — 82565 ASSAY OF CREATININE: CPT

## 2018-02-14 PROCEDURE — 83690 ASSAY OF LIPASE: CPT | Performed by: EMERGENCY MEDICINE

## 2018-02-14 PROCEDURE — 85025 COMPLETE CBC W/AUTO DIFF WBC: CPT | Performed by: EMERGENCY MEDICINE

## 2018-02-14 PROCEDURE — 25000132 ZZH RX MED GY IP 250 OP 250 PS 637: Performed by: PHYSICIAN ASSISTANT

## 2018-02-14 PROCEDURE — 84484 ASSAY OF TROPONIN QUANT: CPT | Mod: 91 | Performed by: EMERGENCY MEDICINE

## 2018-02-14 PROCEDURE — 96361 HYDRATE IV INFUSION ADD-ON: CPT

## 2018-02-14 RX ORDER — LISINOPRIL 20 MG/1
20 TABLET ORAL AT BEDTIME
COMMUNITY

## 2018-02-14 RX ORDER — ACETAMINOPHEN 325 MG/1
650 TABLET ORAL EVERY 4 HOURS PRN
Status: DISCONTINUED | OUTPATIENT
Start: 2018-02-14 | End: 2018-02-16 | Stop reason: HOSPADM

## 2018-02-14 RX ORDER — ELECTROLYTES/DEXTROSE
1 SOLUTION, ORAL ORAL DAILY
COMMUNITY

## 2018-02-14 RX ORDER — BACLOFEN 10 MG/1
15 TABLET ORAL 3 TIMES DAILY
COMMUNITY
End: 2022-12-13 | Stop reason: DRUGHIGH

## 2018-02-14 RX ORDER — HYDROMORPHONE HCL/0.9% NACL/PF 0.2MG/0.2
.2-.5 SYRINGE (ML) INTRAVENOUS
Status: DISCONTINUED | OUTPATIENT
Start: 2018-02-14 | End: 2018-02-14

## 2018-02-14 RX ORDER — VALACYCLOVIR HYDROCHLORIDE 500 MG/1
500 TABLET, FILM COATED ORAL 2 TIMES DAILY
Status: DISCONTINUED | OUTPATIENT
Start: 2018-02-14 | End: 2018-02-16 | Stop reason: HOSPADM

## 2018-02-14 RX ORDER — ONDANSETRON 2 MG/ML
4 INJECTION INTRAMUSCULAR; INTRAVENOUS EVERY 6 HOURS PRN
Status: DISCONTINUED | OUTPATIENT
Start: 2018-02-14 | End: 2018-02-16 | Stop reason: HOSPADM

## 2018-02-14 RX ORDER — AMOXICILLIN 250 MG
1 CAPSULE ORAL 2 TIMES DAILY PRN
Status: DISCONTINUED | OUTPATIENT
Start: 2018-02-14 | End: 2018-02-16 | Stop reason: HOSPADM

## 2018-02-14 RX ORDER — ACETAMINOPHEN 650 MG/1
650 SUPPOSITORY RECTAL EVERY 4 HOURS PRN
Status: DISCONTINUED | OUTPATIENT
Start: 2018-02-14 | End: 2018-02-16 | Stop reason: HOSPADM

## 2018-02-14 RX ORDER — LIDOCAINE 40 MG/G
CREAM TOPICAL
Status: DISCONTINUED | OUTPATIENT
Start: 2018-02-14 | End: 2018-02-16 | Stop reason: HOSPADM

## 2018-02-14 RX ORDER — HYDROMORPHONE HCL/0.9% NACL/PF 0.2MG/0.2
0.4 SYRINGE (ML) INTRAVENOUS
Status: DISCONTINUED | OUTPATIENT
Start: 2018-02-14 | End: 2018-02-14

## 2018-02-14 RX ORDER — IOPAMIDOL 755 MG/ML
500 INJECTION, SOLUTION INTRAVASCULAR ONCE
Status: COMPLETED | OUTPATIENT
Start: 2018-02-14 | End: 2018-02-14

## 2018-02-14 RX ORDER — SODIUM CHLORIDE 9 MG/ML
INJECTION, SOLUTION INTRAVENOUS CONTINUOUS
Status: DISCONTINUED | OUTPATIENT
Start: 2018-02-14 | End: 2018-02-16 | Stop reason: HOSPADM

## 2018-02-14 RX ORDER — CHLORAL HYDRATE 500 MG
1 CAPSULE ORAL DAILY
COMMUNITY
End: 2022-12-13

## 2018-02-14 RX ORDER — HYDROMORPHONE HYDROCHLORIDE 1 MG/ML
0.5 INJECTION, SOLUTION INTRAMUSCULAR; INTRAVENOUS; SUBCUTANEOUS
Status: DISCONTINUED | OUTPATIENT
Start: 2018-02-14 | End: 2018-02-15

## 2018-02-14 RX ORDER — MORPHINE SULFATE 4 MG/ML
4 INJECTION, SOLUTION INTRAMUSCULAR; INTRAVENOUS ONCE
Status: COMPLETED | OUTPATIENT
Start: 2018-02-14 | End: 2018-02-14

## 2018-02-14 RX ORDER — POLYETHYLENE GLYCOL 3350 17 G/17G
17 POWDER, FOR SOLUTION ORAL DAILY PRN
Status: DISCONTINUED | OUTPATIENT
Start: 2018-02-14 | End: 2018-02-16 | Stop reason: HOSPADM

## 2018-02-14 RX ORDER — OXYCODONE HYDROCHLORIDE 5 MG/1
5-10 TABLET ORAL
Status: DISCONTINUED | OUTPATIENT
Start: 2018-02-14 | End: 2018-02-16 | Stop reason: HOSPADM

## 2018-02-14 RX ORDER — ATORVASTATIN CALCIUM 40 MG/1
40 TABLET, FILM COATED ORAL AT BEDTIME
COMMUNITY

## 2018-02-14 RX ORDER — NALOXONE HYDROCHLORIDE 0.4 MG/ML
.1-.4 INJECTION, SOLUTION INTRAMUSCULAR; INTRAVENOUS; SUBCUTANEOUS
Status: DISCONTINUED | OUTPATIENT
Start: 2018-02-14 | End: 2018-02-16 | Stop reason: HOSPADM

## 2018-02-14 RX ORDER — ONDANSETRON 4 MG/1
4 TABLET, ORALLY DISINTEGRATING ORAL EVERY 6 HOURS PRN
Status: DISCONTINUED | OUTPATIENT
Start: 2018-02-14 | End: 2018-02-16 | Stop reason: HOSPADM

## 2018-02-14 RX ORDER — AMOXICILLIN 250 MG
2 CAPSULE ORAL 2 TIMES DAILY PRN
Status: DISCONTINUED | OUTPATIENT
Start: 2018-02-14 | End: 2018-02-16 | Stop reason: HOSPADM

## 2018-02-14 RX ORDER — HYDROMORPHONE HYDROCHLORIDE 1 MG/ML
INJECTION, SOLUTION INTRAMUSCULAR; INTRAVENOUS; SUBCUTANEOUS
Status: COMPLETED
Start: 2018-02-14 | End: 2018-02-14

## 2018-02-14 RX ORDER — LISINOPRIL 20 MG/1
20 TABLET ORAL AT BEDTIME
Status: DISCONTINUED | OUTPATIENT
Start: 2018-02-14 | End: 2018-02-16 | Stop reason: HOSPADM

## 2018-02-14 RX ADMIN — MORPHINE SULFATE 4 MG: 4 INJECTION INTRAVENOUS at 12:45

## 2018-02-14 RX ADMIN — ACETAMINOPHEN 650 MG: 325 TABLET, FILM COATED ORAL at 19:12

## 2018-02-14 RX ADMIN — Medication 0.4 MG: at 14:14

## 2018-02-14 RX ADMIN — Medication 0.5 MG: at 17:09

## 2018-02-14 RX ADMIN — Medication 0.5 MG: at 15:39

## 2018-02-14 RX ADMIN — BACLOFEN 15 MG: 10 TABLET ORAL at 20:41

## 2018-02-14 RX ADMIN — SODIUM CHLORIDE 90 ML: 9 INJECTION, SOLUTION INTRAVENOUS at 13:41

## 2018-02-14 RX ADMIN — CHOLECALCIFEROL CAP 125 MCG (5000 UNIT) 5000 UNITS: 125 CAP at 20:41

## 2018-02-14 RX ADMIN — SODIUM CHLORIDE: 9 INJECTION, SOLUTION INTRAVENOUS at 20:40

## 2018-02-14 RX ADMIN — MORPHINE SULFATE 4 MG: 4 INJECTION INTRAVENOUS at 15:36

## 2018-02-14 RX ADMIN — Medication 0.5 MG: at 19:12

## 2018-02-14 RX ADMIN — Medication 0.4 MG: at 13:20

## 2018-02-14 RX ADMIN — VALACYCLOVIR HYDROCHLORIDE 500 MG: 500 TABLET, FILM COATED ORAL at 20:41

## 2018-02-14 RX ADMIN — OXYCODONE HYDROCHLORIDE 5 MG: 5 TABLET ORAL at 20:50

## 2018-02-14 RX ADMIN — Medication 0.5 MG: at 18:13

## 2018-02-14 RX ADMIN — SODIUM CHLORIDE 500 ML: 9 INJECTION, SOLUTION INTRAVENOUS at 12:49

## 2018-02-14 RX ADMIN — LISINOPRIL 20 MG: 20 TABLET ORAL at 21:41

## 2018-02-14 RX ADMIN — IOPAMIDOL 79 ML: 755 INJECTION, SOLUTION INTRAVENOUS at 13:41

## 2018-02-14 ASSESSMENT — ENCOUNTER SYMPTOMS
FREQUENCY: 0
ABDOMINAL PAIN: 1
DYSURIA: 0

## 2018-02-14 ASSESSMENT — PAIN DESCRIPTION - DESCRIPTORS
DESCRIPTORS: BURNING;CONSTANT
DESCRIPTORS: BURNING;CONSTANT

## 2018-02-14 NOTE — H&P
History and Physical     Linda Grover MRN# [id number]   YOB: 1955 Age: 62 year old      Date of Admission:  2/14/2018    Primary care provider: System, Provider Not In          Assessment and Plan:   Linda Grover is a 62 year old male with a PMH significant for MS and HTN who presents with abdominal pain.     Patient was discussed with Dr. Connolly, who was provider in ED. Chart review of ED work up was performed and is as follows: Vitals show temp of 97.9, pulse 84, and pressure 171/92 with spontaneous respirations and no hypoxia. Labs remarkable for Creatinine 0.84, normal electrolytes, normal LFTs, troponin negative, WBC 7.6, Hgb 14.5. CT chest abdomen shows cholelithiasis.  RUQ Ultrasound shows stones and gallbladder wall thickening. Chart review of Care Everywhere, previous visits and admissions were also reviewed.    1. RUQ/epigastric pain concerning for cholecystitis vs biliary colic  Patient has had intermittent upper abdominal pain for years. He was worked up for coronary etiology which was reportedly negative. There was also a spot on his pancreas that was watched and deemed not the cause. Now, he comes in after eating a fatty meal last night with epigastric pain that radiates across his upper abdomen. Not associated with fever or nausea/vomiting. He states he has noticed that the pain would previously come on with fatty foods, caffeine and wine. He denies smoking and has not had black stool. He takes Prilosec daily and uses Ranitidine PRN.  LFTs are normal cut CT show cholelithiasis with one in the gallbladder neck and the RUQ shows some wall thickening. Presentation is not obviously gallbladder given epicenter of pain is not in his RUQ so will have surgery assess.  -NPO  -Fluid support  -IV pain and nausea meds  -Surgery assessment  -No evidence of infection  -Protonix IV for possible gastritis??    2. Hx of MS  Doing well on new drug, Alemtuzumab  -Continue Baclofen    3. HTN  Resume  Lisinopril    4. HLP  Holding statin    5. GERD  Holding Prilosec  -Added Protonix IV            Social: No concerns  Code: Discussed with patient and they have chosen Full code  VTE prophylaxis: Ambulation  Disposition: Observation                    Chief Complaint:   Stomach pain         History of Present Illness:   Linda Grover is a 62 year old male who presents with severe upper stomach pain that started this AM. He reportedly ate a fatty meal last night and developed severe epigastric pain that radiated to the LEFT side this AM. This pain has progressively worsened with no improvement. He denies nausea, vomiting, fever and diarrhea. He has had similar pains for years that has had no cause despite cardiac work up and routine monitoring of a spot on his pancreas. He comments that the pain seems to come on after red wine, caffeine and fatty meals. He denies black/bloody stools and takes Prilosec daily. He had a cold a couple of weeks ago but otherwise no infections. He started a new MS med about 6 months ago.              Past Medical History:     Past Medical History:   Diagnosis Date     Hypertension      MS (multiple sclerosis) (H)                Past Surgical History:     Past Surgical History:   Procedure Laterality Date     CLOSE SECONDARY WOUND ABDOMEN N/A 4/15/2017    Procedure: CLOSE SECONDARY WOUND ABDOMEN;  Surgeon: Ever Keane MD;  Location:  OR               Social History:     Social History     Social History     Marital status:      Spouse name: N/A     Number of children: N/A     Years of education: N/A     Occupational History     Not on file.     Social History Main Topics     Smoking status: Former Smoker     Smokeless tobacco: Not on file     Alcohol use Yes     Drug use: Not on file     Sexual activity: Not on file     Other Topics Concern     Not on file     Social History Narrative               Family History:     Family history reviewed and is non contributory          Allergies:    No Known Allergies            Medications:     Prior to Admission medications    Medication Sig Last Dose Taking? Auth Provider   RANITIDINE HCL PO Take 150 mg by mouth daily   Reported, Patient   meperidine (DEMEROL) 50 MG tablet Take 50 mg by mouth daily As directed per Dr. Judge   Reported, Patient   valACYclovir (VALTREX) 500 MG tablet Take 500 mg by mouth daily   Reported, Patient   HYDROXYZINE HCL PO Take 50 mg by mouth As directed per Dr. Judge   Reported, Patient   oxyCODONE (ROXICODONE) 5 MG IR tablet Take 1-2 tablets (5-10 mg) by mouth every 3 hours as needed for pain or other (Moderate to Severe)  Patient not taking: Reported on 9/25/2017   Ever Keane MD   BACLOFEN PO    Reported, Patient   LISINOPRIL PO    Reported, Patient              Review of Systems:   A Comprehensive greater than 10 system review of systems was carried out.  Pertinent positives and negatives are noted above.  Otherwise negative for contributory information.            Physical Exam:   Blood pressure (!) 171/92, pulse 76, temperature 97.9  F (36.6  C), temperature source Oral, resp. rate 18, weight 108.9 kg (240 lb), SpO2 97 %.  Exam:  GENERAL:  Comfortable.  PSYCH: pleasant, oriented, No acute distress.  HEENT:  PERRLA. Normal conjunctiva, normal hearing, nasal mucosa and Oropharynx are normal.  NECK:  Supple, no neck vein distention, adenopathy or bruits, normal thyroid.  HEART:  Normal S1, S2 with no murmur, no pericardial rub, gallops or S3 or S4.  LUNGS:  Clear to auscultation, normal Respiratory effort. No wheezing, rales or ronchi.  ABDOMEN:  Soft, no hepatosplenomegaly, normal bowel sounds. Tender epigastrum and RUQ  EXTREMITIES:  No pedal edema, +2 pulses bilateral and equal.  SKIN:  Dry to touch, No rash, wound or ulcerations.  NEUROLOGIC:  CN 2-12 grossly intact,  sensation is intact with no focal deficits.               Data:       Recent Labs  Lab 02/14/18  1245   WBC 7.6   HGB 14.5   HCT 43.6    MCV 90          Recent Labs  Lab 02/14/18  1254 02/14/18  1245   NA  --  139   POTASSIUM  --  3.8   CHLORIDE  --  106   CO2  --  26   ANIONGAP  --  7   GLC  --  92   BUN  --  9   CR  --  0.84   GFRESTIMATED >90 >90   GFRESTBLACK >90 >90   CRISTINA  --  8.6   PROTTOTAL  --  7.7   ALBUMIN  --  4.0   BILITOTAL  --  0.6   ALKPHOS  --  108   AST  --  27   ALT  --  42       Recent Labs  Lab 02/14/18  1303 02/14/18  1245   TROPONIN 0.02  --    TROPI  --  <0.015       Recent Labs  Lab 02/14/18  1231   COLOR Yellow   APPEARANCE Clear   URINEGLC Negative   URINEBILI Negative   URINEKETONE Negative   SG 1.013   UBLD Negative   URINEPH 6.0   PROTEIN Negative   NITRITE Negative   LEUKEST Trace*   RBCU 1   WBCU 1         Recent Results (from the past 24 hour(s))   CT Chest/Abdomen/Pelvis w Contrast    Narrative    CT CHEST/ABDOMEN/PELVIS WITH CONTRAST  2/14/2018 1:59 PM     HISTORY: Chest and abdominal pain.     CONTRAST DOSE:  79 mL Isovue-370    Radiation dose for this scan was reduced using automated exposure  control, adjustment of the mA and/or kV according to patient size, or  iterative reconstruction technique.    FINDINGS:    Chest: There is a good contrast bolus within the pulmonary arteries.  There are no apparent pulmonary arterial filling defects to indicate  pulmonary embolism. There is no evidence of aortic dissection. The  mediastinum and shaun appear within normal limits. Mild linear  atelectasis or fibrosis is noted within the lung bases. The lungs are  otherwise grossly clear. No pleural effusion or pneumothorax.    Abdomen/pelvis: Hepatic fatty infiltration is noted. There are several  small calcified stones dependently within the gallbladder. One small  stone is noted within the gallbladder neck. The kidneys, adrenal  glands, spleen, and pancreas appear within normal limits. There is a  normal-appearing appendix. Scattered colonic diverticulosis is noted.  There is no pericolonic inflammatory stranding. No  evidence of bowel  obstruction. No free peritoneal fluid or air. Pelvic contents appear  within normal limits.      Impression    IMPRESSION:  1. Cholelithiasis with multiple small stones dependently in the  gallbladder. One stone is seen within the gallbladder neck.  2. No CT evidence of pulmonary embolism. Lungs appear clear.  3. Colonic diverticulosis without CT evidence of diverticulitis. No  other CT evidence of an acute inflammatory process in the abdomen or  pelvis.  4. Hepatic fatty infiltration--possible etiologies include consumption  of alcohol or excessive carbohydrate intake, especially  sugar/fructose.  Metabolic syndrome commonly occurs in combination  with nonalcoholic fatty liver disease. Although often reversible,  nonalcoholic fatty liver disease can progress to steatohepatitis and  cirrhosis.    MICKEY MACKAY MD   Abdomen US, limited (RUQ only)    Narrative    US ABDOMEN LIMITED 2/14/2018 3:40 PM     HISTORY: Abdominal pain.    FINDINGS: Multiple small gallstones are noted including within the  gallbladder neck. Gallbladder wall is borderline in thickness. No  biliary dilatation is demonstrated. The liver is increased in  echotexture compatible with fatty infiltration, also noted by CT. The  right kidney appeared within normal limits. The pancreas was  completely obscured by bowel gas.      Impression    IMPRESSION: Cholelithiasis and borderline gallbladder wall thickness.  No biliary dilatation. Hepatic fatty infiltration.    MD Odalis HOLMAN PA-C

## 2018-02-14 NOTE — IP AVS SNAPSHOT
MRN:[id number]                      After Visit Summary   2/14/2018    Linda Grover    MRN: [id number]           Thank you!     Thank you for choosing [facility] for your care. Our goal is always to provide you with excellent care. Hearing back from our patients is one way we can continue to improve our services. Please take a few minutes to complete the written survey that you may receive in the mail after you visit. If you would like to speak to someone directly about your visit please contact Patient Relations at [phone]. Thank you!          Patient Information     Date Of Birth          1955        About your hospital stay     You were admitted on:  February 14, 2018 You last received care in the:  [facility] Observation Department    You were discharged on:  February 16, 2018        Reason for your hospital stay       Registered to observation unit for abdominal pain and taken to OR for cholecystectomy.                  Who to Call     For medical emergencies, please call 911.  For non-urgent questions about your medical care, please call your primary care provider or clinic, None  For questions related to your surgery, please call your surgery clinic        Attending Provider     Provider Shey Andino MD Emergency Medicine    Young, Freddy Ball MD Internal Medicine       Primary Care Provider Fax #    Physician No Ref-Primary [phone]      After Care Instructions     Activity       Activity per surgery team.            Diet       Diet advancement per surgery team.                  Follow-up Appointments     Follow-up and recommended labs and tests        Follow up instructions per surgery team.                             Further instructions from your care team       HOME CARE FOLLOWING LAPAROSCOPIC CHOLECYSTECTOMY  NEL Hong N. Guttormson, D. Maurer, CHRISTI Nowak  Special instructions for Linda Grover:  --call  [phone] to schedule drain removal for Monday 2/19.  You should take a pain pill 30 minutes prior to your drain removal.       INCISIONAL CARE:  Replace the bandage over your incisions until all drainage stops, or if more comfortable to have in place.  If present, leave the steri-strips (white paper tapes) in place for 14 days after surgery.  If Dermabond (a type of skin glue) is present, leave in place until it wears/flakes off.     BATHING:  Avoid baths for 1 week after surgery.  Showers are okay.  You may wash your hair at any time.  Gently pat your incisions dry after bathing.    ACTIVITY:  Light Activity -- you may immediately be up and about as tolerated.  Driving -- you may drive when comfortable and off narcotic pain medications.  Light Work -- resume when comfortable off pain medications.  (If you can drive, you probably can work.)  Strenuous Work/Activity -- limit lifting to 20 pounds for 1 week.  Progressively increase with time.  Active Sports (running, biking, etc.) -- cautiously resume after 2 weeks.    DISCOMFORT:  Use pain medications as prescribed by your surgeon.  Take the pain medication with some food, when possible, to minimize side effects.  Intermittent use of ice packs at the incision sites may help during the first 48 hours.  Expect gradual improvement.  You may experience shoulder pain, which is due to the air placed within your abdomen during the procedure.  This is temporary and usually passes within 2 days.    DIET:  Drink plenty of fluids.  While taking pain medications, increase dietary fiber or add a fiber supplementation like Metamucil or Citrucel to help prevent constipation - a possible side effect of pain medications.  It is not uncommon to experience some bowel changes (loose stools or constipation) after surgery.  Your body has to adapt to you no longer having a gall bladder.  To help minimize this side effect, avoid fatty foods for the first week after surgery.  You may  then slowly increase the amount of fatty foods in your diet.      NAUSEA:  If nauseated from the anesthetic/pain meds; rest in bed, get up cautiously with assistance, and drink clear liquids (juice, tea, broth).    RETURN APPOINTMENT:  Schedule a follow-up visit 2-3 weeks post-op.  Office Phone:  [phone]     CONTACT US IF THE FOLLOWING DEVELOPS:   1. A fever that is above 101     2. If there is a large amount of drainage, bleeding, or swelling.   3. Severe pain that is not relieved by your prescription.   4. Drainage that is thick, cloudy, yellow, green or white.   5. Any other questions not answered by  Frequently Asked Questions  sheet.      FREQUENTLY ASKED QUESTIONS:    Q:  How should my incision look?    A:  Normally your incision will appear slightly swollen with light redness directly along the incision itself as it heals.  It may feel like a bump or ridge as the healing/scarring happens, and over time (3-4 months) this bump or ridge feeling should slowly go away.  In general, clear or pink watery drainage can be normal at first as your incision heals, but should decrease over time.    Q:  How do I know if my incision is infected?  A:  Look at your incision for signs of infection, like redness around the incision spreading to surrounding skin, or drainage of cloudy or foul-smelling drainage.  If you feel warm, check your temperature to see if you are running a fever.    **If any of these things occur, please notify the nurse at our office.  We may need you to come into the office for an incision check.      Q:  How do I take care of my incision?  A:  If you have a dressing in place - Starting the day after surgery, replace the dressing 1-2 times a day until there is no further drainage from the incision.  At that time, a dressing is no longer needed.  Try to minimize tape on the skin if irritation is occurring at the tape sites.  If you have significant irritation from tape on the skin, please call the  office to discuss other method of dressing your incision.    Small pieces of tape called  steri-strips  may be present directly overlying your incision; these may be removed 10 days after surgery unless otherwise specified by your surgeon.  If these tapes start to loosen at the ends, you may trim them back until they fall off or are removed.    A:  If you had  Dermabond  tissue glue used as a dressing (this causes your incision to look shiny with a clear covering over it) - This type of dressing wears off with time and does not require more dressings over the top unless it is draining around the glue as it wears off.  Do not apply ointments or lotions over the incisions until the glue has completely worn off.    Q:  There is a piece of tape or a sticky  lead  still on my skin.  Can I remove this?  A:  Sometimes the sticky  leads  used for monitoring during surgery or for evaluation in the emergency department are not all removed while you are in the hospital.  These sometimes have a tab or metal dot on them.  You can easily remove these on your own, like taking off a band-aid.  If there is a gel substance under the  lead , simply wipe/clean it off with a washcloth or paper towel.      Q:  What can I do to minimize constipation (very hard stools, or lack of stools)?  A:  Stay well hydrated.  Increase your dietary fiber intake or take a fiber supplement -with plenty of water.  Walk around frequently.  You may consider an over-the-counter stool-softener.  Your Pharmacist can assist you with choosing one that is stocked at your pharmacy.  Constipation is also one of the most common side effects of pain medication.  If you are using pain medication, be pro-active and try to PREVENT problems with constipation by taking the steps above BEFORE constipation becomes a problem.    Q:  What do I do if I need more pain medications?  A:  Call the office to receive refills.  Be aware that certain pain meds cannot be called into a  pharmacy and actually require a paper prescription.  A change may be made in your pain med as you progress thru your recovery period or if you have side effects to certain meds.    --Pain meds are NOT refilled after 5pm on weekdays, and NOT AT ALL on the weekends, so please look ahead to prevent problems.      Q:  Why am I having a hard time sleeping now that I am at home?  A:  Many medications you receive while you are in the hospital can impact your sleep for a number of days after your surgery/hospitalization.  Decreased level of activity and naps during the day may also make sleeping at night difficult.  Try to minimize day-time naps, and get up frequently during the day to walk around your home during your recovery time.  Sleep aides may be of some help, but are not recommended for long-term use.      Q:  I am having some back discomfort.  What should I do?  A:  This may be related to certain positioning that was required for your surgery, extended periods of time in bed, or other changes in your overall activity level.  You may try ice, heat, acetaminophen, or ibuprofen to treat this temporarily.  Note that many pain medications have acetaminophen in them and would state this on the prescription bottle.  Be sure not to exceed the maximum of 4000mg per day of acetaminophen.     **If the pain you are having does not resolve, is severe, or is a flare of back pain you have had on other occasions prior to surgery, please contact your primary physician for further recommendations or for an appointment to be examined at their office.    Q:  Why am I having headaches?  A:  Headaches can be caused by many things:  caffeine withdrawal, use of pain meds, dehydration, high blood pressure, lack of sleep, over-activity/exhaustion, flare-up of usual migraine headaches.  If you feel this is related to muscle tension (a band-like feeling around the head, or a pressure at the low-back of the head) you may try ice or heat to  this area.  You may need to drink more fluids (try electrolyte drink like Gatorade), rest, or take your usual migraine medications.   **If your headaches do not resolve, worsen, are accompanied by other symptoms, or if your blood pressure is high, please call your primary physician for recommendation and/or examination.    Q:  I am unable to urinate.  What do I do?  A:  A small percentage of people can have difficulty urinating initially after surgery.  This includes being able to urinate only a very small amount at a time and feeling discomfort or pressure in the very low abdomen.  This is called  urinary retention , and is actually an urgent situation.  Proceed to your nearest Emergency department for evaluation (not an Urgent Care Center).  Sometimes the bladder does not work correctly after certain medications you receive during surgery, or related to certain procedures.  You may need to have a catheter placed until your bladder recovers.  When planning to go to an Emergency department, it may help to call the ER to let them know you are coming in for this problem after a surgery.  This may help you get in quicker to be evaluated.  **If you have symptoms of a urinary tract infection, please contact your primary physician for the proper evaluation and treatment.          If you have other questions, please call the office Monday thru Friday between 8am and 5pm to discuss with the nurse or physician assistant.  #[phone]    There is a surgeon ON CALL on weekday evenings and over the weekend in case of urgent need only, and may be contacted at the same number.    If you are having an emergency, call 911 or proceed to your nearest emergency department.          Pending Results     No orders found from 2/12/2018 to 2/15/2018.            Statement of Approval     Ordered          02/16/18 0950  I have reviewed and agree with all the recommendations and orders detailed in this document.  EFFECTIVE NOW    "  Approved and electronically signed by:  Sherri Puente PA-C           18 0900  I have reviewed and agree with all the recommendations and orders detailed in this document.  EFFECTIVE NOW     Approved and electronically signed by:  Shantell Willingham PA-C           02/15/18 0947  I have reviewed and agree with all the recommendations and orders detailed in this document.  EFFECTIVE NOW     Comments:  Discharge from PACU once cleared by surgery.   Approved and electronically signed by:  Lisa Cloud PA             Admission Information     Date & Time Provider Department Dept. Phone    2018 Freddy Callahan MD [facility] Observation Department [phone]      Your Vitals Were     Blood Pressure Pulse Temperature Respirations Height Weight    154/77 (BP Location: Right arm) 75 98.2  F (36.8  C) (Oral) 18 1.803 m (5' 11\") 115.1 kg (253 lb 12.8 oz)    Pulse Oximetry BMI (Body Mass Index)                91% 35.4 kg/m2          [employer] Information     [employer] lets you send messages to your doctor, view your test results, renew your prescriptions, schedule appointments and more. To sign up, go to [email or web address] . Click on \"Log in\" on the left side of the screen, which will take you to the Welcome page. Then click on \"Sign up Now\" on the right side of the page.     You will be asked to enter the access code listed below, as well as some personal information. Please follow the directions to create your username and password.     Your access code is: [email or web address]  Expires: 2018  2:34 PM     Your access code will  in 90 days. If you need help or a new code, please call your [facility] clinic or [phone].        Care EveryWhere ID     This is your Care EveryWhere ID. This could be used by other organizations to access your [facility] medical records  [id number]        Equal Access to Services     ISIDRO SEVILLA AH: maria del carmen Velazquez, " betty gallagher chapodemetrio, ronal dianajoni puentesaan ah. So [facility] [phone].    ATENCIÓN: Si elif rhoades, tiene a aparicio disposición servicios gratuitos de asistencia lingüística. Harris al [phone].    We comply with applicable federal civil rights laws and [place] laws. We do not discriminate on the basis of race, color, national origin, age, disability, sex, sexual orientation, or gender identity.               Review of your medicines      START taking        Dose / Directions    oxyCODONE IR 5 MG tablet   Commonly known as:  ROXICODONE   Used for:  Biliary calculus of other site without obstruction        Dose:  5-10 mg   Take 1-2 tablets (5-10 mg) by mouth every 4 hours as needed for other (pain control or improvement in physical function.)   Quantity:  20 tablet   Refills:  0         CONTINUE these medicines which have NOT CHANGED        Dose / Directions    alemtuzumab 12 mg        5 day infusion once a year   Refills:  0       aspirin 81 MG EC tablet        Dose:  81 mg   Take 81 mg by mouth daily   Refills:  0       atorvastatin 40 MG tablet   Commonly known as:  LIPITOR        Dose:  40 mg   Take 40 mg by mouth At Bedtime   Refills:  0       baclofen 10 MG tablet   Commonly known as:  LIORESAL        Dose:  15 mg   Take 15 mg by mouth 3 times daily   Refills:  0       cholecalciferol 5000 UNITS Caps capsule   Commonly known as:  vitamin D3        Dose:  5000 Units   Take 5,000 Units by mouth daily   Refills:  0       fish oil-omega-3 fatty acids 1000 MG capsule        Dose:  1 g   Take 1 g by mouth daily   Refills:  0       lisinopril 20 MG tablet   Commonly known as:  PRINIVIL/ZESTRIL        Dose:  20 mg   Take 20 mg by mouth At Bedtime   Refills:  0       MULTIVITAMIN ADULT Tabs        Dose:  1 tablet   Take 1 tablet by mouth daily   Refills:  0       omeprazole 20 MG CR capsule   Commonly known as:  priLOSEC        Dose:  20 mg   Take 20 mg by mouth daily   Refills:  0        valACYclovir 500 MG tablet   Commonly known as:  VALTREX        Dose:  500 mg   Take 500 mg by mouth 2 times daily   Refills:  0            Where to get your medicines      Some of these will need a paper prescription and others can be bought over the counter. Ask your nurse if you have questions.     Bring a paper prescription for each of these medications     oxyCODONE IR 5 MG tablet                Protect others around you: Learn how to safely use, store and throw away your medicines at www.disposemymeds.org.        Information about OPIOIDS     PRESCRIPTION OPIOIDS: WHAT YOU NEED TO KNOW    Prescription opioids can be used to help relieve moderate to severe pain and are often prescribed following a surgery or injury, or for certain health conditions. These medications can be an important part of treatment but also come with serious risks. It is important to work with your health care provider to make sure you are getting the safest, most effective care.    WHAT ARE THE RISKS AND SIDE EFFECTS OF OPIOID USE?  Prescription opioids carry serious risks of addiction and overdose, especially with prolonged use. An opioid overdose, often marked by slowed breathing can cause sudden death. The use of prescription opioids can have a number of side effects as well, even when taken as directed:      Tolerance - meaning you might need to take more of a medication for the same pain relief    Physical dependence - meaning you have symptoms of withdrawal when a medication is stopped    Increased sensitivity to pain    Constipation    Nausea, vomiting, and dry mouth    Sleepiness and dizziness    Confusion    Depression    Low levels of testosterone that can result in lower sex drive, energy, and strength    Itching and sweating    RISKS ARE GREATER WITH:    History of drug misuse, substance use disorder, or overdose    Mental health conditions (such as depression or anxiety)    Sleep apnea    Older age (65 years or  older)    Pregnancy    Avoid alcohol while taking prescription opioids.   Also, unless specifically advised by your health care provider, medications to avoid include:    Benzodiazepines (such as Xanax or Valium)    Muscle relaxants (such as Soma or Flexeril)    Hypnotics (such as Ambien or Lunesta)    Other prescription opioids    KNOW YOUR OPTIONS:  Talk to your health care provider about ways to manage your pain that do not involve prescription opioids. Some of these options may actually work better and have fewer risks and side effects:    Pain relievers such as acetaminophen, ibuprofen, and naproxen    Some medications that are also used for depression or seizures    Physical therapy and exercise    Cognitive behavioral therapy, a psychological, goal-directed approach, in which patients learn how to modify physical, behavioral, and emotional triggers of pain and stress    IF YOU ARE PRESCRIBED OPIOIDS FOR PAIN:    Never take opioids in greater amounts or more often than prescribed    Follow up with your primary health care provider and work together to create a plan on how to manage your pain.    Talk about ways to help manage your pain that do not involve prescription opioids    Talk about all concerns and side effects    Help prevent misuse and abuse    Never sell or share prescription opioids    Never use another person's prescription opioids    Store prescription opioids in a secure place and out of reach of others (this may include visitors, children, friends, and family)    Visit www.cdc.gov/drugoverdose to learn about risks of opioid abuse and overdose    If you believe you may be struggling with addiction, tell your health care provider and ask for guidance or call [facility] National Helpline at 0-538-853-HELP    LEARN MORE / www.cdc.gov/drugoverdose/prescribing/guideline.html    Safely dispose of unused prescription opioids: Find your local drug take-back programs and more information about the  importance of safe disposal at www.doseofreality.[place].gov             Medication List: This is a list of all your medications and when to take them. Check marks below indicate your daily home schedule. Keep this list as a reference.      Medications           Morning Afternoon Evening Bedtime As Needed    alemtuzumab 12 mg   5 day infusion once a year                                aspirin 81 MG EC tablet   Take 81 mg by mouth daily                                atorvastatin 40 MG tablet   Commonly known as:  LIPITOR   Take 40 mg by mouth At Bedtime                                baclofen 10 MG tablet   Commonly known as:  LIORESAL   Take 15 mg by mouth 3 times daily   Last time this was given:  15 mg on 2/16/2018  8:48 AM                                cholecalciferol 5000 UNITS Caps capsule   Commonly known as:  vitamin D3   Take 5,000 Units by mouth daily   Last time this was given:  5,000 Units on 2/16/2018  8:48 AM                                fish oil-omega-3 fatty acids 1000 MG capsule   Take 1 g by mouth daily                                lisinopril 20 MG tablet   Commonly known as:  PRINIVIL/ZESTRIL   Take 20 mg by mouth At Bedtime   Last time this was given:  20 mg on 2/15/2018  9:33 PM                                MULTIVITAMIN ADULT Tabs   Take 1 tablet by mouth daily                                omeprazole 20 MG CR capsule   Commonly known as:  priLOSEC   Take 20 mg by mouth daily                                oxyCODONE IR 5 MG tablet   Commonly known as:  ROXICODONE   Take 1-2 tablets (5-10 mg) by mouth every 4 hours as needed for other (pain control or improvement in physical function.)   Last time this was given:  10 mg on 2/16/2018  9:34 AM                                valACYclovir 500 MG tablet   Commonly known as:  VALTREX   Take 500 mg by mouth 2 times daily   Last time this was given:  500 mg on 2/16/2018  8:48 AM                                          More Information         Oxycodone tablets or capsules  Brand Names: Dazidox, Endocodone, Oxaydo, OxyIR, Percolone, Roxicodone, ROXYBOND  What is this medicine?  OXYCODONE (ox i KOMARK done) is a pain reliever. It is used to treat moderate to severe pain.  How should I use this medicine?  Take this medicine by mouth with a glass of water. Follow the directions on the prescription label. You can take it with or without food. If it upsets your stomach, take it with food. Take your medicine at regular intervals. Do not take it more often than directed. Do not stop taking except on your doctor's advice.  Some brands of this medicine, like Oxecta, have special instructions. Ask your doctor or pharmacist if these directions are for you: Do not cut, crush or chew this medicine. Swallow only one tablet at a time. Do not wet, soak, or lick the tablet before you take it.  A special MedGuide will be given to you by the pharmacist with each prescription and refill. Be sure to read this information carefully each time.  Talk to your pediatrician regarding the use of this medicine in children. Special care may be needed.  What side effects may I notice from receiving this medicine?  Side effects that you should report to your doctor or health care professional as soon as possible:    allergic reactions like skin rash, itching or hives, swelling of the face, lips, or tongue    breathing problems    confusion    signs and symptoms of low blood pressure like dizziness; feeling faint or lightheaded, falls; unusually weak or tired    trouble passing urine or change in the amount of urine    trouble swallowing  Side effects that usually do not require medical attention (report to your doctor or health care professional if they continue or are bothersome):    constipation    dry mouth    nausea, vomiting    tiredness  What may interact with this medicine?  This medicine may interact with the following medications:    alcohol    antihistamines for allergy,  cough and cold    antiviral medicines for HIV or AIDS    atropine    certain antibiotics like clarithromycin, erythromycin, linezolid, rifampin    certain medicines for anxiety or sleep    certain medicines for bladder problems like oxybutynin, tolterodine    certain medicines for depression like amitriptyline, fluoxetine, sertraline    certain medicines for fungal infections like ketoconazole, itraconazole, voriconazole    certain medicines for migraine headache like almotriptan, eletriptan, frovatriptan, naratriptan, rizatriptan, sumatriptan, zolmitriptan    certain medicines for nausea or vomiting like dolasetron, ondansetron, palonosetron    certain medicines for Parkinson's disease like benztropine, trihexyphenidyl    certain medicines for seizures like phenobarbital, phenytoin, primidone    certain medicines for stomach problems like dicyclomine, hyoscyamine    certain medicines for travel sickness like scopolamine    diuretics    general anesthetics like halothane, isoflurane, methoxyflurane, propofol    ipratropium    local anesthetics like lidocaine, pramoxine, tetracaine    MAOIs like Carbex, Eldepryl, Marplan, Nardil, and Parnate    medicines that relax muscles for surgery    methylene blue    nilotinib    other narcotic medicines for pain or cough    phenothiazines like chlorpromazine, mesoridazine, prochlorperazine, thioridazine  What if I miss a dose?  If you miss a dose, take it as soon as you can. If it is almost time for your next dose, take only that dose. Do not take double or extra doses.  Where should I keep my medicine?  Keep out of the reach of children. This medicine can be abused. Keep your medicine in a safe place to protect it from theft. Do not share this medicine with anyone. Selling or giving away this medicine is dangerous and against the law.  Store at room temperature between 15 and 30 degrees C (59 and 86 degrees F). Protect from light. Keep container tightly closed.  This medicine  may cause accidental overdose and death if it is taken by other adults, children, or pets. Flush any unused medicine down the toilet to reduce the chance of harm. Do not use the medicine after the expiration date.  What should I tell my health care provider before I take this medicine?  They need to know if you have any of these conditions:    Jai's disease    brain tumor    head injury    heart disease    history of drug or alcohol abuse problem    if you often drink alcohol    kidney disease    liver disease    lung or breathing disease, like asthma    mental illness    pancreatic disease    seizures    thyroid disease    an unusual or allergic reaction to oxycodone, codeine, hydrocodone, morphine, other medicines, foods, dyes, or preservatives    pregnant or trying to get pregnant    breast-feeding  What should I watch for while using this medicine?  Tell your doctor or health care professional if your pain does not go away, if it gets worse, or if you have new or a different type of pain. You may develop tolerance to the medicine. Tolerance means that you will need a higher dose of the medicine for pain relief. Tolerance is normal and is expected if you take this medicine for a long time.  Do not suddenly stop taking your medicine because you may develop a severe reaction. Your body becomes used to the medicine. This does NOT mean you are addicted. Addiction is a behavior related to getting and using a drug for a non-medical reason. If you have pain, you have a medical reason to take pain medicine. Your doctor will tell you how much medicine to take. If your doctor wants you to stop the medicine, the dose will be slowly lowered over time to avoid any side effects.  There are different types of narcotic medicines (opiates). If you take more than one type at the same time or if you are taking another medicine that also causes drowsiness, you may have more side effects. Give your health care provider a list of  all medicines you use. Your doctor will tell you how much medicine to take. Do not take more medicine than directed. Call emergency for help if you have problems breathing or unusual sleepiness.  You may get drowsy or dizzy. Do not drive, use machinery, or do anything that needs mental alertness until you know how the medicine affects you. Do not stand or sit up quickly, especially if you are an older patient. This reduces the risk of dizzy or fainting spells. Alcohol may interfere with the effect of this medicine. Avoid alcoholic drinks.  This medicine will cause constipation. Try to have a bowel movement at least every 2 to 3 days. If you do not have a bowel movement for 3 days, call your doctor or health care professional.  Your mouth may get dry. Chewing sugarless gum or sucking hard candy, and drinking plenty of water may help. Contact your doctor if the problem does not go away or is severe.  NOTE:This sheet is a summary. It may not cover all possible information. If you have questions about this medicine, talk to your doctor, pharmacist, or health care provider. Copyright  2017 [employer]                Discharge Instructions: Caring for Your Mango-Neal Drainage Tube  Your doctor discharges you with a Mango-Neal drainage tube. Doctors commonly leave this drain within the abdominal cavity after surgery. It helps drain and collect blood and body fluid after surgery. This can prevent swelling and reduces the risk for infection. The tube is held in place by a few stitches. It is covered with a bandage. Your doctor will remove the drain when he or she determines you no longer need it.  Home care    Don t sleep on the same side as the tube.    Secure the tube and bag inside your clothing with a safety pin. This helps keep the tube from being pulled out.    Empty your drain at least twice a day. Empty it more often if the drain is full. Wash  and dry your hands before emptying the drain.    Lift the opening on  the drain.    Drain the fluid into a measuring cup.    Record the amount of fluid each time you empty the drain. Include the date and time it was emptied. Share this information with your doctor on your next visit.    Squeeze the bulb with your hands until you hear air coming out of the bulb if your doctor has instructed you to do so (sometimes the bulb is used as a reservoir without suction). Check with your doctor about specific drain instructions.    Close the opening.    Change the dressing around the tube every day.    Wash your hands.    Remove the old bandage.    Wash your hands again.    Wet a cotton swab and clean the skin around the incision and tube site. Use normal saline solution (salt and water). Or, you can use warm, soapy water.    Put a new bandage on the incision and tube site. Make the bandage large enough to cover the whole incision area.    Tape the bandage in place.    Keep the bandage and tube site dry when you shower. Ask your healthcare provider about the best way to do this.     Stripping  the tube helps keep blood clots from blocking the tube. Ask your nurse how often you should strip the tube. Stripping may not be needed, depending on where and why your doctor placed the tube. It may even be dangerous in some cases.     Hold the tubing where it leaves the skin, with one hand. This keeps it from pulling on the skin.    Pinch the tubing with the thumb and first finger of your other hand.    Slowly and firmly pull your thumb and first finger down the tubing. You may find it helpful to hold an alcohol swab between your fingers and the tube to lubricate the tubing.    If the pulling hurts or feels like it s coming out of the skin, stop. Begin again more gently.  Follow-up care  Make a follow-up appointment as directed by our staff.     When to seek medical care  Call your healthcare provider right away if you have any of the following:    New or increased pain around the tube    Redness,  swelling, or warmth around the incision or tube    Drainage that is foul-smelling    Vomiting    Fever of 100.4 F (38 C)    Fluid leaking around the tube    Incision seems not to be healing    Stitches become loose    Tube falls out or breaks    Drainage that changes from light pink to dark red    Blood clots in the drainage bulb    A sudden increase or decrease in the amount of drainage (over 30 mL)   Date Last Reviewed: 2/1/2017 2000-2017 [employer]. [address] [place] [address]. All rights reserved. This information is not intended as a substitute for professional medical care. Always follow your healthcare professional's instructions.

## 2018-02-14 NOTE — ED NOTES
Pt with mid-epigastric pain which radiates to LUQ off and on throughout the day, c/o feeling bloated also. Denies n/v. Denies SOB. ABC's intact, alert and oriented X3.

## 2018-02-14 NOTE — PHARMACY-ADMISSION MEDICATION HISTORY
Admission medication history interview status for this patient is complete. See [facility] admission navigator for allergy information, prior to admission medications and immunization status.     Medication history interview source(s):Patient  Medication history resources (including written lists, pill bottles, clinic record):Care Everywhere  Primary pharmacy:[facility]    Changes made to PTA medication list:  Added: Atorvastatin, Vitamins, aspirin, vitamin D, fish oil, omeprazole, lemtrada infusion  Deleted: Hydroxyzine, demerol, oxycodone, ranitidine  Changed: Added dose and frequency to baclofen, lisinopril, valacyclovir from daily to bid    Actions taken by pharmacist (provider contacted, etc):Called [facility] for dose and frequency     Additional medication history information:None    Medication reconciliation/reorder completed by provider prior to medication history? No    Do you take OTC medications (eg tylenol, ibuprofen, fish oil, eye/ear drops, etc)? Y    Prior to Admission medications    Medication Sig Last Dose Taking? Auth Provider   baclofen (LIORESAL) 10 MG tablet Take 15 mg by mouth 3 times daily 2/14/2018 at x1 Yes Unknown, Entered By History   lisinopril (PRINIVIL/ZESTRIL) 20 MG tablet Take 20 mg by mouth At Bedtime 2/13/2018 at pm Yes Unknown, Entered By History   omeprazole (PRILOSEC) 20 MG CR capsule Take 20 mg by mouth daily 2/13/2018 at Unknown time Yes Unknown, Entered By History   atorvastatin (LIPITOR) 40 MG tablet Take 40 mg by mouth At Bedtime 2/13/2018 at pm Yes Unknown, Entered By History   Multiple Vitamins-Minerals (MULTIVITAMIN ADULT) TABS Take 1 tablet by mouth daily 2/13/2018 at Unknown time Yes Unknown, Entered By History   fish oil-omega-3 fatty acids 1000 MG capsule Take 1 g by mouth daily 2/13/2018 at Unknown time Yes Unknown, Entered By History   cholecalciferol (VITAMIN D3) 5000 UNITS CAPS capsule Take 5,000 Units by mouth daily 2/13/2018 at Unknown time Yes Unknown, Entered By History    aspirin 81 MG EC tablet Take 81 mg by mouth daily 2/13/2018 at Unknown time Yes Unknown, Entered By History   valACYclovir (VALTREX) 500 MG tablet Take 500 mg by mouth 2 times daily  2/13/2018 at Unknown time Yes Reported, Patient   alemtuzumab 12 mg 5 day infusion once a year Unknown at Unknown time  Unknown, Entered By History

## 2018-02-14 NOTE — ED NOTES
"[facility]  ED Nurse Handoff Report    Linda Grover is a 62 year old male   ED Chief complaint: Abdominal Pain  . ED Diagnosis:   Final diagnoses:   Biliary calculus of other site without obstruction     Allergies: No Known Allergies    Code Status: Full Code  Activity level - Baseline/Home:  Independent. Activity Level - Current:   Independent. Lift room needed: No. Bariatric: No   Needed: No   Isolation: No. Infection: Not Applicable.     Vital Signs:   Vitals:    02/14/18 1207 02/14/18 1215 02/14/18 1330 02/14/18 1413   BP: (!) 196/125 (!) 197/118 (!) 160/94 162/90   Pulse:  75 88 76   Resp:       Temp:       TempSrc:       SpO2:  100% 99% 97%   Weight:           Cardiac Rhythm:  ,      Pain level: 0-10 Pain Scale: 8  Patient confused: No. Patient Falls Risk: Yes.   Elimination Status: Has voided   Patient Report - Initial Complaint: Abdominal pain. Focused Assessment: A&O. C/O abdominal pain in left upper quadrant that spreads across abdomen. Reports feeling bloated and \"swollen.\" Little relief with pain meds   Tests Performed: CT, US labs. Abnormal Results:   Labs Ordered and Resulted from Time of ED Arrival Up to the Time of Departure from the ED   CBC WITH PLATELETS DIFFERENTIAL - Abnormal; Notable for the following:        Result Value    RDW 15.4 (*)     All other components within normal limits   ROUTINE UA WITH MICROSCOPIC - Abnormal; Notable for the following:     Leukocyte Esterase Urine Trace (*)     Mucous Urine Present (*)     All other components within normal limits   COMPREHENSIVE METABOLIC PANEL   LIPASE   TROPONIN I   CREATININE POCT   PERIPHERAL IV CATHETER   ISTAT CREATININE NURSING POCT   ISTAT TROPONIN NURSING POCT   TROPONIN POCT     CT Chest/Abdomen/Pelvis w Contrast   Final Result   IMPRESSION:   1. Cholelithiasis with multiple small stones dependently in the   gallbladder. One stone is seen within the gallbladder neck.   2. No CT evidence of pulmonary embolism. " Lungs appear clear.   3. Colonic diverticulosis without CT evidence of diverticulitis. No   other CT evidence of an acute inflammatory process in the abdomen or   pelvis.   4. Hepatic fatty infiltration--possible etiologies include consumption   of alcohol or excessive carbohydrate intake, especially   sugar/fructose.  Metabolic syndrome commonly occurs in combination   with nonalcoholic fatty liver disease. Although often reversible,   nonalcoholic fatty liver disease can progress to steatohepatitis and   cirrhosis.      MICKEY MACKAY MD      Abdomen US, limited (RUQ only)    (Results Pending)     .   Treatments provided: Pain medications, ivf  Family Comments: wife at bedside  OBS brochure/video discussed/provided to patient:  No  ED Medications:   Medications   HYDROmorphone (DILAUDID) injection 0.4 mg (0.4 mg Intravenous Given 2/14/18 1414)   HYDROmorphone (DILAUDID) injection 0.2-0.5 mg (not administered)   0.9% sodium chloride BOLUS (0 mLs Intravenous Stopped 2/14/18 1410)   morphine (PF) injection 4 mg (4 mg Intravenous Given 2/14/18 1245)   iopamidol (ISOVUE-370) solution 500 mL (79 mLs Intravenous Given 2/14/18 1341)   0.9% sodium chloride BOLUS (0 mLs Intravenous Stopped 2/14/18 1404)     Drips infusing:  No  For the majority of the shift, the patient's behavior Green. Interventions performed were NA.     Severe Sepsis OR Septic Shock Diagnosis Present: No      ED Nurse Name/Phone Number: [place]ia Seth,   2:45 PM    RECEIVING UNIT ED HANDOFF REVIEW    Above ED Nurse Handoff Report was reviewed: Yes  Reviewed by: July Travis on February 14, 2018 at 6:15 PM

## 2018-02-14 NOTE — IP AVS SNAPSHOT
[facility]    [address]    [facility] [address]    Phone:  [phone]                                       After Visit Summary   2/14/2018    Linda Grover    MRN: [id number]           After Visit Summary Signature Page     I have received my discharge instructions, and my questions have been answered. I have discussed any challenges I see with this plan with the nurse or doctor.    ..........................................................................................................................................  Patient/Patient Representative Signature      ..........................................................................................................................................  Patient Representative Print Name and Relationship to Patient    ..................................................               ................................................  Date                                            Time    ..........................................................................................................................................  Reviewed by Signature/Title    ...................................................              ..............................................  Date                                                            Time
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
## 2018-02-15 ENCOUNTER — ANESTHESIA (OUTPATIENT)
Dept: SURGERY | Facility: CLINIC | Age: 63
End: 2018-02-15
Payer: COMMERCIAL

## 2018-02-15 ENCOUNTER — ANESTHESIA EVENT (OUTPATIENT)
Dept: SURGERY | Facility: CLINIC | Age: 63
End: 2018-02-15
Payer: COMMERCIAL

## 2018-02-15 ENCOUNTER — APPOINTMENT (OUTPATIENT)
Dept: SURGERY | Facility: PHYSICIAN GROUP | Age: 63
End: 2018-02-15
Payer: COMMERCIAL

## 2018-02-15 LAB
ALBUMIN SERPL-MCNC: 3.1 G/DL (ref 3.4–5)
ALP SERPL-CCNC: 88 U/L (ref 40–150)
ALT SERPL W P-5'-P-CCNC: 32 U/L (ref 0–70)
ANION GAP SERPL CALCULATED.3IONS-SCNC: 5 MMOL/L (ref 3–14)
AST SERPL W P-5'-P-CCNC: 21 U/L (ref 0–45)
BILIRUB SERPL-MCNC: 1 MG/DL (ref 0.2–1.3)
BUN SERPL-MCNC: 7 MG/DL (ref 7–30)
CALCIUM SERPL-MCNC: 8 MG/DL (ref 8.5–10.1)
CHLORIDE SERPL-SCNC: 106 MMOL/L (ref 94–109)
CO2 SERPL-SCNC: 27 MMOL/L (ref 20–32)
CREAT SERPL-MCNC: 0.87 MG/DL (ref 0.66–1.25)
ERYTHROCYTE [DISTWIDTH] IN BLOOD BY AUTOMATED COUNT: 15.4 % (ref 10–15)
GFR SERPL CREATININE-BSD FRML MDRD: 89 ML/MIN/1.7M2
GLUCOSE SERPL-MCNC: 93 MG/DL (ref 70–99)
HCT VFR BLD AUTO: 39.4 % (ref 40–53)
HGB BLD-MCNC: 13.1 G/DL (ref 13.3–17.7)
MCH RBC QN AUTO: 30 PG (ref 26.5–33)
MCHC RBC AUTO-ENTMCNC: 33.2 G/DL (ref 31.5–36.5)
MCV RBC AUTO: 90 FL (ref 78–100)
PLATELET # BLD AUTO: 198 10E9/L (ref 150–450)
POTASSIUM SERPL-SCNC: 3.7 MMOL/L (ref 3.4–5.3)
PROT SERPL-MCNC: 6.3 G/DL (ref 6.8–8.8)
RBC # BLD AUTO: 4.37 10E12/L (ref 4.4–5.9)
SODIUM SERPL-SCNC: 138 MMOL/L (ref 133–144)
WBC # BLD AUTO: 14.8 10E9/L (ref 4–11)

## 2018-02-15 PROCEDURE — 40000936 ZZH STATISTIC OUTPATIENT (NON-OBS) NIGHT

## 2018-02-15 PROCEDURE — 37000008 ZZH ANESTHESIA TECHNICAL FEE, 1ST 30 MIN: Performed by: SURGERY

## 2018-02-15 PROCEDURE — 25000132 ZZH RX MED GY IP 250 OP 250 PS 637: Performed by: PHYSICIAN ASSISTANT

## 2018-02-15 PROCEDURE — 25000128 H RX IP 250 OP 636: Performed by: ANESTHESIOLOGY

## 2018-02-15 PROCEDURE — 25800025 ZZH RX 258: Performed by: SURGERY

## 2018-02-15 PROCEDURE — 96375 TX/PRO/DX INJ NEW DRUG ADDON: CPT

## 2018-02-15 PROCEDURE — 25000125 ZZHC RX 250: Performed by: NURSE ANESTHETIST, CERTIFIED REGISTERED

## 2018-02-15 PROCEDURE — 47562 LAPAROSCOPIC CHOLECYSTECTOMY: CPT | Performed by: SURGERY

## 2018-02-15 PROCEDURE — 99225 ZZC SUBSEQUENT OBSERVATION CARE,LEVEL II: CPT | Performed by: PHYSICIAN ASSISTANT

## 2018-02-15 PROCEDURE — 36000058 ZZH SURGERY LEVEL 3 EA 15 ADDTL MIN: Performed by: SURGERY

## 2018-02-15 PROCEDURE — 25000128 H RX IP 250 OP 636: Performed by: NURSE ANESTHETIST, CERTIFIED REGISTERED

## 2018-02-15 PROCEDURE — 36415 COLL VENOUS BLD VENIPUNCTURE: CPT | Performed by: PHYSICIAN ASSISTANT

## 2018-02-15 PROCEDURE — 25000566 ZZH SEVOFLURANE, EA 15 MIN: Performed by: SURGERY

## 2018-02-15 PROCEDURE — 25000128 H RX IP 250 OP 636: Performed by: SURGERY

## 2018-02-15 PROCEDURE — 27210794 ZZH OR GENERAL SUPPLY STERILE: Performed by: SURGERY

## 2018-02-15 PROCEDURE — 96376 TX/PRO/DX INJ SAME DRUG ADON: CPT

## 2018-02-15 PROCEDURE — 88304 TISSUE EXAM BY PATHOLOGIST: CPT | Performed by: SURGERY

## 2018-02-15 PROCEDURE — 99203 OFFICE O/P NEW LOW 30 MIN: CPT | Mod: 57 | Performed by: SURGERY

## 2018-02-15 PROCEDURE — 25000125 ZZHC RX 250: Performed by: PHYSICIAN ASSISTANT

## 2018-02-15 PROCEDURE — 36000060 ZZH SURGERY LEVEL 3 W FLUORO 1ST 30 MIN: Performed by: SURGERY

## 2018-02-15 PROCEDURE — 25000128 H RX IP 250 OP 636: Performed by: PHYSICIAN ASSISTANT

## 2018-02-15 PROCEDURE — 85027 COMPLETE CBC AUTOMATED: CPT | Performed by: PHYSICIAN ASSISTANT

## 2018-02-15 PROCEDURE — 27210995 ZZH RX 272: Performed by: SURGERY

## 2018-02-15 PROCEDURE — 71000013 ZZH RECOVERY PHASE 1 LEVEL 1 EA ADDTL HR: Performed by: SURGERY

## 2018-02-15 PROCEDURE — 25000125 ZZHC RX 250: Performed by: SURGERY

## 2018-02-15 PROCEDURE — 47562 LAPAROSCOPIC CHOLECYSTECTOMY: CPT | Mod: AS | Performed by: PHYSICIAN ASSISTANT

## 2018-02-15 PROCEDURE — 71000012 ZZH RECOVERY PHASE 1 LEVEL 1 FIRST HR: Performed by: SURGERY

## 2018-02-15 PROCEDURE — 37000009 ZZH ANESTHESIA TECHNICAL FEE, EACH ADDTL 15 MIN: Performed by: SURGERY

## 2018-02-15 PROCEDURE — 40000306 ZZH STATISTIC PRE PROC ASSESS II: Performed by: SURGERY

## 2018-02-15 PROCEDURE — 88304 TISSUE EXAM BY PATHOLOGIST: CPT | Mod: 26 | Performed by: SURGERY

## 2018-02-15 PROCEDURE — G0378 HOSPITAL OBSERVATION PER HR: HCPCS

## 2018-02-15 PROCEDURE — 80053 COMPREHEN METABOLIC PANEL: CPT | Performed by: PHYSICIAN ASSISTANT

## 2018-02-15 RX ORDER — PROPOFOL 10 MG/ML
INJECTION, EMULSION INTRAVENOUS PRN
Status: DISCONTINUED | OUTPATIENT
Start: 2018-02-15 | End: 2018-02-15

## 2018-02-15 RX ORDER — NALOXONE HYDROCHLORIDE 0.4 MG/ML
.1-.4 INJECTION, SOLUTION INTRAMUSCULAR; INTRAVENOUS; SUBCUTANEOUS
Status: DISCONTINUED | OUTPATIENT
Start: 2018-02-15 | End: 2018-02-15 | Stop reason: HOSPADM

## 2018-02-15 RX ORDER — CEFAZOLIN SODIUM 2 G/100ML
2 INJECTION, SOLUTION INTRAVENOUS
Status: COMPLETED | OUTPATIENT
Start: 2018-02-15 | End: 2018-02-15

## 2018-02-15 RX ORDER — MEPERIDINE HYDROCHLORIDE 25 MG/ML
12.5 INJECTION INTRAMUSCULAR; INTRAVENOUS; SUBCUTANEOUS
Status: DISCONTINUED | OUTPATIENT
Start: 2018-02-15 | End: 2018-02-15 | Stop reason: HOSPADM

## 2018-02-15 RX ORDER — HYDROMORPHONE HYDROCHLORIDE 1 MG/ML
.3-.5 INJECTION, SOLUTION INTRAMUSCULAR; INTRAVENOUS; SUBCUTANEOUS
Status: DISCONTINUED | OUTPATIENT
Start: 2018-02-15 | End: 2018-02-16 | Stop reason: HOSPADM

## 2018-02-15 RX ORDER — ONDANSETRON 2 MG/ML
4 INJECTION INTRAMUSCULAR; INTRAVENOUS EVERY 30 MIN PRN
Status: DISCONTINUED | OUTPATIENT
Start: 2018-02-15 | End: 2018-02-15 | Stop reason: HOSPADM

## 2018-02-15 RX ORDER — FENTANYL CITRATE 50 UG/ML
25-50 INJECTION, SOLUTION INTRAMUSCULAR; INTRAVENOUS
Status: DISCONTINUED | OUTPATIENT
Start: 2018-02-15 | End: 2018-02-15 | Stop reason: HOSPADM

## 2018-02-15 RX ORDER — HYDROMORPHONE HYDROCHLORIDE 1 MG/ML
.3-.5 INJECTION, SOLUTION INTRAMUSCULAR; INTRAVENOUS; SUBCUTANEOUS EVERY 10 MIN PRN
Status: DISCONTINUED | OUTPATIENT
Start: 2018-02-15 | End: 2018-02-15 | Stop reason: HOSPADM

## 2018-02-15 RX ORDER — SODIUM CHLORIDE, SODIUM LACTATE, POTASSIUM CHLORIDE, CALCIUM CHLORIDE 600; 310; 30; 20 MG/100ML; MG/100ML; MG/100ML; MG/100ML
INJECTION, SOLUTION INTRAVENOUS CONTINUOUS
Status: DISCONTINUED | OUTPATIENT
Start: 2018-02-15 | End: 2018-02-15 | Stop reason: HOSPADM

## 2018-02-15 RX ORDER — LIDOCAINE 40 MG/G
CREAM TOPICAL
Status: DISCONTINUED | OUTPATIENT
Start: 2018-02-15 | End: 2018-02-15 | Stop reason: HOSPADM

## 2018-02-15 RX ORDER — FENTANYL CITRATE 50 UG/ML
INJECTION, SOLUTION INTRAMUSCULAR; INTRAVENOUS PRN
Status: DISCONTINUED | OUTPATIENT
Start: 2018-02-15 | End: 2018-02-15

## 2018-02-15 RX ORDER — ONDANSETRON 4 MG/1
4 TABLET, ORALLY DISINTEGRATING ORAL EVERY 30 MIN PRN
Status: DISCONTINUED | OUTPATIENT
Start: 2018-02-15 | End: 2018-02-15 | Stop reason: HOSPADM

## 2018-02-15 RX ORDER — DEXAMETHASONE SODIUM PHOSPHATE 4 MG/ML
INJECTION, SOLUTION INTRA-ARTICULAR; INTRALESIONAL; INTRAMUSCULAR; INTRAVENOUS; SOFT TISSUE PRN
Status: DISCONTINUED | OUTPATIENT
Start: 2018-02-15 | End: 2018-02-15

## 2018-02-15 RX ORDER — LIDOCAINE HYDROCHLORIDE 10 MG/ML
INJECTION, SOLUTION INFILTRATION; PERINEURAL PRN
Status: DISCONTINUED | OUTPATIENT
Start: 2018-02-15 | End: 2018-02-15

## 2018-02-15 RX ORDER — HYDROMORPHONE HYDROCHLORIDE 1 MG/ML
.3-.5 INJECTION, SOLUTION INTRAMUSCULAR; INTRAVENOUS; SUBCUTANEOUS
Status: CANCELLED | OUTPATIENT
Start: 2018-02-15

## 2018-02-15 RX ORDER — BUPIVACAINE HYDROCHLORIDE AND EPINEPHRINE 5; 5 MG/ML; UG/ML
INJECTION, SOLUTION PERINEURAL PRN
Status: DISCONTINUED | OUTPATIENT
Start: 2018-02-15 | End: 2018-02-15 | Stop reason: HOSPADM

## 2018-02-15 RX ORDER — CEFAZOLIN SODIUM 1 G/3ML
1 INJECTION, POWDER, FOR SOLUTION INTRAMUSCULAR; INTRAVENOUS SEE ADMIN INSTRUCTIONS
Status: DISCONTINUED | OUTPATIENT
Start: 2018-02-15 | End: 2018-02-15 | Stop reason: HOSPADM

## 2018-02-15 RX ORDER — NALOXONE HYDROCHLORIDE 0.4 MG/ML
.1-.4 INJECTION, SOLUTION INTRAMUSCULAR; INTRAVENOUS; SUBCUTANEOUS
Status: CANCELLED | OUTPATIENT
Start: 2018-02-15

## 2018-02-15 RX ORDER — NEOSTIGMINE METHYLSULFATE 1 MG/ML
VIAL (ML) INJECTION PRN
Status: DISCONTINUED | OUTPATIENT
Start: 2018-02-15 | End: 2018-02-15

## 2018-02-15 RX ORDER — GLYCOPYRROLATE 0.2 MG/ML
INJECTION, SOLUTION INTRAMUSCULAR; INTRAVENOUS PRN
Status: DISCONTINUED | OUTPATIENT
Start: 2018-02-15 | End: 2018-02-15

## 2018-02-15 RX ADMIN — FENTANYL CITRATE 50 MCG: 50 INJECTION, SOLUTION INTRAMUSCULAR; INTRAVENOUS at 10:50

## 2018-02-15 RX ADMIN — ONDANSETRON 4 MG: 2 INJECTION INTRAMUSCULAR; INTRAVENOUS at 11:14

## 2018-02-15 RX ADMIN — PHENYLEPHRINE HYDROCHLORIDE 50 MCG: 10 INJECTION, SOLUTION INTRAMUSCULAR; INTRAVENOUS; SUBCUTANEOUS at 10:38

## 2018-02-15 RX ADMIN — BACLOFEN 15 MG: 10 TABLET ORAL at 14:55

## 2018-02-15 RX ADMIN — LIDOCAINE HYDROCHLORIDE 50 MG: 10 INJECTION, SOLUTION INFILTRATION; PERINEURAL at 10:13

## 2018-02-15 RX ADMIN — CEFAZOLIN SODIUM 2 G: 2 INJECTION, SOLUTION INTRAVENOUS at 10:03

## 2018-02-15 RX ADMIN — PHENYLEPHRINE HYDROCHLORIDE 100 MCG: 10 INJECTION, SOLUTION INTRAMUSCULAR; INTRAVENOUS; SUBCUTANEOUS at 10:19

## 2018-02-15 RX ADMIN — DEXAMETHASONE SODIUM PHOSPHATE 8 MG: 4 INJECTION, SOLUTION INTRA-ARTICULAR; INTRALESIONAL; INTRAMUSCULAR; INTRAVENOUS; SOFT TISSUE at 10:13

## 2018-02-15 RX ADMIN — PANTOPRAZOLE SODIUM 40 MG: 40 INJECTION, POWDER, FOR SOLUTION INTRAVENOUS at 08:04

## 2018-02-15 RX ADMIN — GLYCOPYRROLATE 0.7 MG: 0.2 INJECTION, SOLUTION INTRAMUSCULAR; INTRAVENOUS at 11:34

## 2018-02-15 RX ADMIN — OXYCODONE HYDROCHLORIDE 10 MG: 5 TABLET ORAL at 00:23

## 2018-02-15 RX ADMIN — SODIUM CHLORIDE, POTASSIUM CHLORIDE, SODIUM LACTATE AND CALCIUM CHLORIDE: 600; 310; 30; 20 INJECTION, SOLUTION INTRAVENOUS at 10:03

## 2018-02-15 RX ADMIN — ACETAMINOPHEN 650 MG: 325 TABLET, FILM COATED ORAL at 14:55

## 2018-02-15 RX ADMIN — SENNOSIDES AND DOCUSATE SODIUM 2 TABLET: 8.6; 5 TABLET ORAL at 21:32

## 2018-02-15 RX ADMIN — OXYCODONE HYDROCHLORIDE 10 MG: 5 TABLET ORAL at 21:33

## 2018-02-15 RX ADMIN — Medication 0.5 MG: at 17:52

## 2018-02-15 RX ADMIN — FENTANYL CITRATE 50 MCG: 50 INJECTION INTRAMUSCULAR; INTRAVENOUS at 13:25

## 2018-02-15 RX ADMIN — ROCURONIUM BROMIDE 10 MG: 10 INJECTION INTRAVENOUS at 11:11

## 2018-02-15 RX ADMIN — VALACYCLOVIR HYDROCHLORIDE 500 MG: 500 TABLET, FILM COATED ORAL at 08:04

## 2018-02-15 RX ADMIN — FENTANYL CITRATE 100 MCG: 50 INJECTION, SOLUTION INTRAMUSCULAR; INTRAVENOUS at 10:16

## 2018-02-15 RX ADMIN — HYDROMORPHONE HYDROCHLORIDE 0.5 MG: 1 INJECTION, SOLUTION INTRAMUSCULAR; INTRAVENOUS; SUBCUTANEOUS at 12:48

## 2018-02-15 RX ADMIN — FENTANYL CITRATE 50 MCG: 50 INJECTION INTRAMUSCULAR; INTRAVENOUS at 12:39

## 2018-02-15 RX ADMIN — LISINOPRIL 20 MG: 20 TABLET ORAL at 21:33

## 2018-02-15 RX ADMIN — ROCURONIUM BROMIDE 10 MG: 10 INJECTION INTRAVENOUS at 10:34

## 2018-02-15 RX ADMIN — MIDAZOLAM 2 MG: 1 INJECTION INTRAMUSCULAR; INTRAVENOUS at 10:03

## 2018-02-15 RX ADMIN — FENTANYL CITRATE 50 MCG: 50 INJECTION, SOLUTION INTRAMUSCULAR; INTRAVENOUS at 10:45

## 2018-02-15 RX ADMIN — Medication 5 MG: at 11:34

## 2018-02-15 RX ADMIN — PROPOFOL 200 MG: 10 INJECTION, EMULSION INTRAVENOUS at 10:13

## 2018-02-15 RX ADMIN — FENTANYL CITRATE 50 MCG: 50 INJECTION INTRAMUSCULAR; INTRAVENOUS at 12:15

## 2018-02-15 RX ADMIN — BACLOFEN 15 MG: 10 TABLET ORAL at 19:32

## 2018-02-15 RX ADMIN — Medication 0.3 MG: at 15:01

## 2018-02-15 RX ADMIN — Medication 0.5 MG: at 06:46

## 2018-02-15 RX ADMIN — GLYCOPYRROLATE 0.2 MG: 0.2 INJECTION, SOLUTION INTRAMUSCULAR; INTRAVENOUS at 10:13

## 2018-02-15 RX ADMIN — BACLOFEN 15 MG: 10 TABLET ORAL at 08:04

## 2018-02-15 RX ADMIN — ROCURONIUM BROMIDE 10 MG: 10 INJECTION INTRAVENOUS at 11:03

## 2018-02-15 RX ADMIN — ROCURONIUM BROMIDE 40 MG: 10 INJECTION INTRAVENOUS at 10:13

## 2018-02-15 RX ADMIN — PHENYLEPHRINE HYDROCHLORIDE 50 MCG: 10 INJECTION, SOLUTION INTRAMUSCULAR; INTRAVENOUS; SUBCUTANEOUS at 10:26

## 2018-02-15 RX ADMIN — SODIUM CHLORIDE: 9 INJECTION, SOLUTION INTRAVENOUS at 06:48

## 2018-02-15 RX ADMIN — VALACYCLOVIR HYDROCHLORIDE 500 MG: 500 TABLET, FILM COATED ORAL at 19:32

## 2018-02-15 RX ADMIN — CHOLECALCIFEROL CAP 125 MCG (5000 UNIT) 5000 UNITS: 125 CAP at 08:04

## 2018-02-15 ASSESSMENT — LIFESTYLE VARIABLES: TOBACCO_USE: 0

## 2018-02-15 ASSESSMENT — PAIN DESCRIPTION - DESCRIPTORS
DESCRIPTORS: SHARP;SHOOTING
DESCRIPTORS: BURNING;CONSTANT;TIGHTNESS
DESCRIPTORS: SHARP;SHOOTING

## 2018-02-15 ASSESSMENT — ENCOUNTER SYMPTOMS: DYSRHYTHMIAS: 0

## 2018-02-15 NOTE — ANESTHESIA POSTPROCEDURE EVALUATION
Patient: Linda Grover    Procedure(s):  LAPAROSCOPIC CHOLECYSTECTOMY - Wound Class: II-Clean Contaminated    Diagnosis:biliary colic  Diagnosis Additional Information: Pre-operative diagnosis:  biliary colic  Post-operative diagnosis: Gangrenous cholecystitis  Procedure: Laparoscopic Cholecystectomy        Anesthesia Type:  General, ETT    Note:  Anesthesia Post Evaluation    Patient location during evaluation: PACU  Patient participation: Able to fully participate in evaluation  Level of consciousness: awake  Pain management: adequate  Airway patency: patent  Cardiovascular status: acceptable  Respiratory status: acceptable  Hydration status: euvolemic  PONV: controlled     Anesthetic complications: None          Last vitals:  Vitals:    02/15/18 1220 02/15/18 1230 02/15/18 1246   BP: 142/80 146/85    Pulse:      Resp: 13 22 14   Temp:      SpO2: 92% 96% 94%         Electronically Signed By: Destin Mak MD  February 15, 2018  12:54 PM

## 2018-02-15 NOTE — PROGRESS NOTES
"[facility]   Observation Unit   Progress Note    Assessment & Plan   Linda Grover is a 62 year old man with a history of MS, HTN. HLD, and GERD who was admitted to observation with RUQ/epigastric abdominal pain thought to be c/w biliary colic. Is now s/p lap fady and staying overnight for close monitoring due to gangrenous findings.     #Gangrenous Cholecystitis. S/p lap fady by Dr. Solares - POD #0. A surgical drain was placed after hemostasis achieved w/ cautery and FloSeal placed in the gallbladder bed followd by Surgicej luis An. T 100.8 deg pre op w/ WBC count 14.8. LFTs wnl. Is afebrile and hemodynamically stable post op.   - Per d/w surgery does not need antibiotic therapy at this time. Low threshold to reassess.   - Close monitoring for bleeding complications w/ hgb check in AM per d/w surgery.   - Surgery will see in AM to reassess drain. Anticipate dark/bloody output overnight given gangrenous findings.   - Pain control w/ PRN Tylenol, oxycodone or IV Dilaudid if not tolerating PO/needed for breakthrough.   - Start clears, ADAT.     #HTN. BPs controlled.  - Continue home lisinopril w/ holding parameters.     #Multiple Sclerosis.   - Continue home Baclofen.   - Also on alemtuzumab as outpatient.     #HLD.   - Home statin on hold.     #GERD.   - On IV PPI until taking home PO.     Diet: Start clears, ADAT  Fluids: NS @ 100 cc/hr  DVT Prophylaxis: Ambulate  Code Status: Full  Disposition: Anticipate home tomorrow pending pain control, PO tolerance, stable hgb     Lisa Cloud PA-C  Hospitalist Service    Interval History   Patient seen after OR and is resting comfortably. Has some pain around the surgical/drain site but this is controlled. No f/c. No N/V. Feels hungry. No CP or SOB.     Physical Exam   /88 (BP Location: Right arm)  Pulse 93  Temp 96.5  F (35.8  C) (Oral)  Resp 20  Ht 1.803 m (5' 11\")  Wt 115.1 kg (253 lb 12.8 oz)  SpO2 91%  BMI 35.4 kg/m2     GENERAL: Alert and " oriented x 3. Lying in bed, appears comfortable. Pleasant and conversant. Wife at bedside.   HEENT: Anicteric sclera. Mucous membranes moist.   CV: RRR. S1, S2. No murmurs appreciated.   RESPIRATORY: Effort normal on room air. Lungs CTAB with no wheezing, rales, rhonchi.   GI: Abdomen soft, mildly distended, bowel sounds present. Mild tenderness around drain site. ANTHONY drain w/ serosanguineous output. No rebound or guarding.   NEUROLOGICAL: No focal deficits. Moves all extremities.    EXTREMITIES: No peripheral edema. Intact bilateral pedal pulses.   SKIN: No jaundice. No rashes.     Data reviewed today: I reviewed all medications, new labs and imaging results over the last 24 hours.

## 2018-02-15 NOTE — PLAN OF CARE
Problem: Patient Care Overview  Goal: Discharge Needs Assessment  Outcome: No Change  Problem: Patient Care Overview  Goal: Plan of Care/Patient Progress Review  Outcome: No Change  PRIMARY DIAGNOSIS: BILIARY COLIC/UNCOMPLICATED EARLY ACUTE CHOLECYSTITIS  OUTPATIENT/OBSERVATION GOALS TO BE MET BEFORE DISCHARGE:      1. Pain status: Improved-controlled with oral pain medications.  2. Stable vital signs and labs (if performed) at disposition: Yes  3. Tolerating adequate PO diet: Yes- NPO ex ice chips  4. Successful cholecystectomy or clear follow up plan with General Surgery team if immediate surgery not performed No- surgery consult tomorrow  5. ADLs back to baseline?  No- Stand by assist  6. Activity and level of assistance: Up with standby assistance.  7. Barriers to discharge noted Yes Surgical consult          Discharge Planner Nurse   Safe discharge environment identified: Lives with wife  Barriers to discharge: Yes- surgical consult       Entered by: July Peterson 02/15/2018   Please review provider order for any additional goals.   Nurse to notify provider when observation goals have been met and patient is ready for discharge.     VSS, patient states pain is much better rating it 4/10, declines pain meds at this time. NPO for surgery consult- surgery consulted and patient will go down for surgery. Reported off to Pre op nurse.

## 2018-02-15 NOTE — CONSULTS
Chief complaint:  Abdominal pain, right upper quadrant    HPI:  This patient is a 62 year old male who presents with upper abdominal pain and gallstones.  The patient has had several recent episodes, and is undergone a workup including a cardiac workup.  He states that his pain is predominantly epigastric and right upper quadrant, though he has recently also had some left upper quadrant discomfort.    Past Medical History:   has a past medical history of Hypertension and MS (multiple sclerosis) (H).    Past Surgical History:  Past Surgical History:   Procedure Laterality Date     CLOSE SECONDARY WOUND ABDOMEN N/A 4/15/2017    Procedure: CLOSE SECONDARY WOUND ABDOMEN;  Surgeon: Ever Keane MD;  Location:  OR        Social History:  Social History     Social History     Marital status:      Spouse name: N/A     Number of children: N/A     Years of education: N/A     Occupational History     Not on file.     Social History Main Topics     Smoking status: Former Smoker     Smokeless tobacco: Not on file     Alcohol use Yes     Drug use: Not on file     Sexual activity: Not on file     Other Topics Concern     Not on file     Social History Narrative        Family History:  No family history on file.    Review of Systems:  The 10 point Review of Systems is negative other than noted in the HPI and above.    Physical Exam:  General - This is a well developed, well nourished male in no apparent distress.  HEENT - Normocephalic, atraumatic.  No scleral icterus.  Neck - supple without masses  Lungs - clear to ascultation.    Heart - Regular rate & rhythm without murmur  Abdomen:   soft, non-distended with mild tenderness noted in the right upper quadrant and epigastric region. no masses palpated. normal bowel sounds.  Extremities - warm without edema  Neurologic - nonfocal    Relevant labs:  Normal liver function tests.  White blood cell count is 14,800.  Hemoglobin is 13.1.    Imaging:  CT and ultrasound  revealed cholelithiasis with gallbladder wall thickness of about 3 mm.    Assessment and Plan:  This is a patient with gallstones and right upper quadrant pain.  I have recommended Laparoscopic cholecystectomy.  Discussed procedure, risks and complications.  We discussed the possibility that some or all of his symptoms might not be related to his gallbladder.  We will plan on surgery later today.    Rolo Solares MD  [facility]

## 2018-02-15 NOTE — PLAN OF CARE
Problem: Patient Care Overview  Goal: Discharge Needs Assessment  Outcome: No Change  PRIMARY DIAGNOSIS: BILIARY COLIC/UNCOMPLICATED EARLY ACUTE CHOLECYSTITIS  OUTPATIENT/OBSERVATION GOALS TO BE MET BEFORE DISCHARGE:     1. Pain status: Improved-controlled with oral pain medications.  2. Stable vital signs and labs (if performed) at disposition: Yes  3. Tolerating adequate PO diet: Yes- NPO ex ice chips  4. Successful cholecystectomy or clear follow up plan with General Surgery team if immediate surgery not performed No- surgery consult tomorrow  5. ADLs back to baseline?  No- Stand by assist  6. Activity and level of assistance: Up with standby assistance.  7. Barriers to discharge noted Yes Surgical consult        Discharge Planner Nurse   Safe discharge environment identified: Lives with wife  Barriers to discharge: Yes- surgical consult       Entered by: Yun Russell 02/15/2018   Please review provider order for any additional goals.   Nurse to notify provider when observation goals have been met and patient is ready for discharge.

## 2018-02-15 NOTE — PLAN OF CARE
Problem: Patient Care Overview  Goal: Discharge Needs Assessment  Outcome: No Change  .PRIMARY DIAGNOSIS: Lap Chle  OUTPATIENT/OBSERVATION GOALS TO BE MET BEFORE DISCHARGE:  1. Stable vital signs Yes, except for 2.5L of O2 oxygen 95%  2. Tolerating diet:No, will order clears  3. Pain controlled with oral pain medications:  No, will transition to oral pain meds  4. Positive bowel sounds:  No  5. Voiding without difficulty:  voided 175, bladder scanned for 690- updated evening shift nurse  6. Able to ambulate:  Yes  7. Provider specific discharge goals met:  No    Discharge Planner Nurse   Safe discharge environment identified: Yes  Barriers to discharge: No       Entered by: July Travis 02/15/2018 3:35 PM     Please review provider order for any additional goals.   Nurse to notify provider when observation goals have been met and patient is ready for discharge.

## 2018-02-15 NOTE — ANESTHESIA CARE TRANSFER NOTE
Patient: Linda Grover    Procedure(s):  LAPAROSCOPIC CHOLECYSTECTOMY - Wound Class: II-Clean Contaminated    Diagnosis: biliary colic  Diagnosis Additional Information: No value filed.    Anesthesia Type:   General, ETT     Note:  Airway :Face Mask  Patient transferred to:PACU  Comments: VSS. Spontaneously breathing O2 per open face mask.  Report given to RN.Handoff Report: Identifed the Patient, Identified the Reponsible Provider, Reviewed the pertinent medical history, Discussed the surgical course, Reviewed Intra-OP anesthesia mangement and issues during anesthesia, Set expectations for post-procedure period and Allowed opportunity for questions and acknowledgement of understanding      Vitals: (Last set prior to Anesthesia Care Transfer)    CRNA VITALS  2/15/2018 1113 - 2/15/2018 1148      2/15/2018             Pulse: 105    SpO2: 93 %    Resp Rate (observed): (!)  6                Electronically Signed By: JUAN DIEGO Carr CRNA  February 15, 2018  11:48 AM

## 2018-02-15 NOTE — PLAN OF CARE
Problem: Patient Care Overview  Goal: Plan of Care/Patient Progress Review  Outcome: No Change  PRIMARY DIAGNOSIS: BILIARY COLIC/UNCOMPLICATED EARLY ACUTE CHOLECYSTITIS  OUTPATIENT/OBSERVATION GOALS TO BE MET BEFORE DISCHARGE:      1. Pain status: Improved-controlled with oral pain medications.  2. Stable vital signs and labs (if performed) at disposition: Yes  3. Tolerating adequate PO diet: Yes- NPO ex ice chips  4. Successful cholecystectomy or clear follow up plan with General Surgery team if immediate surgery not performed No- surgery consult tomorrow  5. ADLs back to baseline?  No- Stand by assist  6. Activity and level of assistance: Up with standby assistance.  7. Barriers to discharge noted Yes Surgical consult          Discharge Planner Nurse   Safe discharge environment identified: Lives with wife  Barriers to discharge: Yes- surgical consult       Entered by: Yun Russell 02/15/2018   Please review provider order for any additional goals.   Nurse to notify provider when observation goals have been met and patient is ready for discharge.    [id number]: Pt resting comfortably. VSS. A&O. Pain controlled with oxycodone. Transfers with SBA. NPO ex. Ice chips. Surgical consult today. Will continue to monitor.

## 2018-02-15 NOTE — DISCHARGE SUMMARY
[facility]   Observation Unit  Discharge Summary     Date of Admission: 2/14/2018  Date of Discharge: 2/15/2018  Discharging Provider: Lisa Cloud PA-C    Discharge Diagnoses   Acute Cholecystitis   Fatty Liver Disease  Multiple Sclerosis   Hypertension     Hospital Course   Linda Grover is a 62 year old man with a history of MS and HTN who presented to the ED for further evaluation of epigastric and RUQ abdominal pain. This had been intermittent over a few years but came on acutely after eating a fatty meal. CT C/A/P showed cholelithiasis w/ multiple antonio tones dependently in the gallbladder and one within the neck. Also colonic diverticulosis w/o diverticulitis, and hepatic fatty infiltration. Not known to consume ETOH and LFTs were wnl. Lipase also wnl. RUQ US showed cholelithiasis and borderline gallbladder wall thickness w/o biliary dilatation. Registered to observation unit for further evaluation. Surgery consult was obtained. Noted temp to 100.8 degrees and WBC count 7.6-->14.8 this morning. Suspected acute cholecystitis and was taken to the OR this morning for further management. We were not able to see patient prior to this. Defer further management to surgery and anticipate that patient will d/c from PACU with appropriate follow up instructions per surgery. BMI is 35 and with fatty liver findings on CT, weight loss should be encouraged and follow up with PCP.     Consultations This Hospital Stay   Surgery    Physical Exam   Patient was not evaluated by hospitalist on day of discharge.     Pending Results   None    Primary Care Physician   No Ref-Primary, Physician    Discharge Disposition   Discharged to home  Condition at discharge: Stable    Code Status   Full     Discharge Procedure Orders  Reason for your hospital stay   Order Comments: Registered to observation unit for abdominal pain and taken to OR for cholecystectomy.     Follow-up and recommended labs and tests    Order Comments:  Follow up instructions per surgery team.     Activity   Order Comments: Activity per surgery team.   Order Specific Question Answer Comments   Is discharge order? Yes      Full Code     Diet   Order Comments: Diet advancement per surgery team.   Order Specific Question Answer Comments   Is discharge order? Yes           Discharge Medications   Current Discharge Medication List      CONTINUE these medications which have NOT CHANGED    Details   baclofen (LIORESAL) 10 MG tablet Take 15 mg by mouth 3 times daily      lisinopril (PRINIVIL/ZESTRIL) 20 MG tablet Take 20 mg by mouth At Bedtime      omeprazole (PRILOSEC) 20 MG CR capsule Take 20 mg by mouth daily      atorvastatin (LIPITOR) 40 MG tablet Take 40 mg by mouth At Bedtime      Multiple Vitamins-Minerals (MULTIVITAMIN ADULT) TABS Take 1 tablet by mouth daily      fish oil-omega-3 fatty acids 1000 MG capsule Take 1 g by mouth daily      cholecalciferol (VITAMIN D3) 5000 UNITS CAPS capsule Take 5,000 Units by mouth daily      aspirin 81 MG EC tablet Take 81 mg by mouth daily      valACYclovir (VALTREX) 500 MG tablet Take 500 mg by mouth 2 times daily       alemtuzumab 12 mg 5 day infusion once a year              Lisa Cloud PA-C  Hospitalist Service

## 2018-02-15 NOTE — PLAN OF CARE
Problem: Patient Care Overview  Goal: Discharge Needs Assessment  Outcome: No Change  ROOM # 212-2    Living Situation (if not independent, order SW consult): With wife Gayathri in [facility] name:  : Gayathri (Wife) [phone]    Activity level at baseline: Independent   Activity level on admit: Independent      Patient registered to observation; given Patient Bill of Rights; given the opportunity to ask questions about observation status and their plan of care.  Patient has been oriented to the observation room, bathroom and call light is in place.    Discussed discharge goals and expectations with patient/family.

## 2018-02-15 NOTE — ANESTHESIA PREPROCEDURE EVALUATION
PAC NOTE:       ANESTHESIA PRE EVALUATION:  Anesthesia Evaluation     .             ROS/MED HX    ENT/Pulmonary:      (-) tobacco use, sleep apnea and Other pulmonary disease   Neurologic:     (+)Multiple Sclerosis    (-) Other neuro hx   Cardiovascular:     (+) hypertension----. : . . . :. .      (-) CAD, CHF, arrhythmias and pulmonary hypertension   METS/Exercise Tolerance:     Hematologic:        (-) anemia   Musculoskeletal:        (-) arthritis   GI/Hepatic:     (+) GERD cholecystitis/cholelithiasis,      (-) other GI/Hepatic   Renal/Genitourinary:      (-) renal disease   Endo:      (-) Type I DM, Type II DM, thyroid disease, chronic steroid usage, other endocrine disorder and obesity   Psychiatric:        (-) psychiatric history   Infectious Disease:  - neg infectious disease ROS       Malignancy:      - no malignancy   Other:                     Physical Exam      Airway   Mallampati: II  TM distance: >3 FB  Neck ROM: full    Dental     Cardiovascular   Rhythm and rate: regular and normal  (-) no murmur    Pulmonary    breath sounds clear to auscultation    Other findings: Lab Test        02/15/18     02/14/18     07/09/17                       0614          1245          1010          WBC          14.8*        7.6          10.8          HGB          13.1*        14.5         15.1          MCV          90           90           90            PLT          198          245          237            Lab Test        02/15/18     02/14/18     07/09/17                       0614          1245          1010          NA           138          139          141           POTASSIUM    3.7          3.8          3.7           CHLORIDE     106          106          107           CO2          27           26           26            BUN          7            9            12            CR           0.87         0.84         0.87          ANIONGAP     5            7            8             CRISTINA          8.0*         8.6           9.0           [facility]          93           92           109*                 Anesthesia Plan      History & Physical Review  History and physical reviewed and following examination; no interval change.    ASA Status:  2 .    NPO Status:  > 8 hours    Plan for General and ETT with Propofol induction. Maintenance will be Balanced.    PONV prophylaxis:  Ondansetron (or other 5HT-3) and Dexamethasone or Solumedrol       Postoperative Care  Postoperative pain management:  IV analgesics and Oral pain medications.      Consents  Anesthetic plan, risks, benefits and alternatives discussed with:  Patient.  Use of blood products discussed: Yes.   Use of blood products discussed with Patient.  Consented to blood products.  .                            .

## 2018-02-15 NOTE — OP NOTE
General Surgery Operative Note    Pre-operative diagnosis:  biliary colic   Post-operative diagnosis: Gangrenous cholecystitis   Procedure: Laparoscopic Cholecystectomy   Surgeon: Rolo Solares MD   Assistant(s): Moraima Goldstein PA-C - the physician assistant was medically necessary to assist in prepping, positioning, camera operation, retraction/exposure and closure of the port site.    Anesthesia: General    Estimated blood loss: 50 cc's   Drains placed: Hardik   Complications:  None   Findings:   inflamed gallbladder covered with a thick layer of fat.  The gallbladder was filled with clot in the posterior wall was gangrenous with erosion into the liver.       INDICATIONS FOR OPERATION: This is a patient with upper abdominal pain and gallstones.  He had had several previous episodes.  Laparoscopic cholecystectomy was recommended.  The procedure along with its risks and complications was discussed in detail and the patient agreed to proceed.    DETAILS OF THE OPERATION: After informed consent the patient was taken to the operating room where he underwent satisfactory induction of general anesthesia.  The patient was then sterilely prepped and draped.  A supraumbilical skin incision was made using a skin knife.  The dissection was carried bluntly down to the fascia.  The fascia was opened using electrocautery and the Jaramillo trocar was then inserted.  Pneumoperitoneum was achieved using CO2 insufflation, and under direct visualization  three 5 mm upper abdominal ports were placed.  The gallbladder was visualized and was obviously inflamed.  We aspirated about 20 cc of dark bile from the gallbladder.  Once it was sufficiently decompressed, the gallbladder was grasped. It was pulled up over the liver and the cystic duct, which was located under a thick layer of fat was gradually exposed.  The cystic duct was skeletonized, triple clipped and divided.  The cystic artery was likewise triple clipped and divided,  along with any posterior branches.  There was some fibrosis in the region and a bit more than the expected amount of bleeding.  We did get good control of bleeding with clips.  The gallbladder was then dissected away from the liver using electrocautery.  The dissection plane was obliterated by fibrosis consistent with gangrenous cholecystitis.  We did open into the gallbladder fairly significantly along the posterior wall.  There was a large organized clot within the gallbladder.  Once the gallbladder was completely  from the liver, it was placed in an Endo Catch bag and removed through the supraumbilical incision.  The gallbladder fossa was irrigated out.  There is a fair amount of oozing from the gallbladder bed and the dissection had been very intrahepatic.  We get reasonable control of bleeding with cautery and FloSeal was then placed in the gallbladder bed followed by Surgicel Nu-Knit.  This resulted in excellent hemostasis.  A 15-[place] round Hardik drain was now placed across this area and brought out through the lateral port site.  It was sutured in place at the skin.  The trocar sites were now infiltrated with 0.5% Marcaine and the trochars were removed under direct visualization.  The supraumbilical fascia was then closed using 0 Vicryl suture.  Skin incisions were closed using 4-0 subcuticular Vicryl followed by Steri-Strips.    The patient was transferred to the recovery room in satisfactory condition.  Sponge and needle counts were correct at the close of the case.  The patient will be observed overnight and we will check a hemoglobin in the morning.      Specimens:   ID Type Source Tests Collected by Time Destination   A : gallbladder and content Tissue Gallbladder and Contents SURGICAL PATHOLOGY EXAM Rolo Solares MD 2/15/2018 10:49 AM            Rolo Solares MD

## 2018-02-15 NOTE — PLAN OF CARE
Problem: Patient Care Overview  Goal: Plan of Care/Patient Progress Review  Outcome: No Change  PRIMARY DIAGNOSIS: BILIARY COLIC/UNCOMPLICATED EARLY ACUTE CHOLECYSTITIS  OUTPATIENT/OBSERVATION GOALS TO BE MET BEFORE DISCHARGE:    1. Pain status: Improved-controlled with oral pain medications.  2. Stable vital signs and labs (if performed) at disposition: Yes  3. Tolerating adequate PO diet: Yes- NPO ex ice chips  4. Successful cholecystectomy or clear follow up plan with General Surgery team if immediate surgery not performed No- surgery consult tomorrow  5. ADLs back to baseline?  No- Stand by assist  6. Activity and level of assistance: Up with standby assistance.  7. Barriers to discharge noted Yes Surgical consult      Discharge Planner Nurse   Safe discharge environment identified: Lives with wife  Barriers to discharge: Yes- surgical consult       Entered by: Yun Russell 02/14/2018 10:25 PM     Please review provider order for any additional goals.   Nurse to notify provider when observation goals have been met and patient is ready for discharge.

## 2018-02-16 VITALS
TEMPERATURE: 96.7 F | HEART RATE: 75 BPM | HEIGHT: 71 IN | DIASTOLIC BLOOD PRESSURE: 78 MMHG | BODY MASS INDEX: 35.53 KG/M2 | RESPIRATION RATE: 18 BRPM | OXYGEN SATURATION: 91 % | WEIGHT: 253.8 LBS | SYSTOLIC BLOOD PRESSURE: 145 MMHG

## 2018-02-16 LAB
COPATH REPORT: NORMAL
ERYTHROCYTE [DISTWIDTH] IN BLOOD BY AUTOMATED COUNT: 15.4 % (ref 10–15)
HCT VFR BLD AUTO: 36.3 % (ref 40–53)
HGB BLD-MCNC: 12.2 G/DL (ref 13.3–17.7)
MCH RBC QN AUTO: 30.7 PG (ref 26.5–33)
MCHC RBC AUTO-ENTMCNC: 33.6 G/DL (ref 31.5–36.5)
MCV RBC AUTO: 91 FL (ref 78–100)
PLATELET # BLD AUTO: 194 10E9/L (ref 150–450)
RBC # BLD AUTO: 3.98 10E12/L (ref 4.4–5.9)
WBC # BLD AUTO: 14.4 10E9/L (ref 4–11)

## 2018-02-16 PROCEDURE — 99217 ZZC OBSERVATION CARE DISCHARGE: CPT | Performed by: PHYSICIAN ASSISTANT

## 2018-02-16 PROCEDURE — G0378 HOSPITAL OBSERVATION PER HR: HCPCS

## 2018-02-16 PROCEDURE — 96376 TX/PRO/DX INJ SAME DRUG ADON: CPT

## 2018-02-16 PROCEDURE — 25000125 ZZHC RX 250: Performed by: PHYSICIAN ASSISTANT

## 2018-02-16 PROCEDURE — 36415 COLL VENOUS BLD VENIPUNCTURE: CPT | Performed by: PHYSICIAN ASSISTANT

## 2018-02-16 PROCEDURE — 85027 COMPLETE CBC AUTOMATED: CPT | Performed by: PHYSICIAN ASSISTANT

## 2018-02-16 PROCEDURE — 25000132 ZZH RX MED GY IP 250 OP 250 PS 637: Performed by: PHYSICIAN ASSISTANT

## 2018-02-16 RX ORDER — OXYCODONE HYDROCHLORIDE 5 MG/1
5-10 TABLET ORAL EVERY 4 HOURS PRN
Qty: 20 TABLET | Refills: 0 | Status: SHIPPED | OUTPATIENT
Start: 2018-02-16 | End: 2018-11-14

## 2018-02-16 RX ADMIN — VALACYCLOVIR HYDROCHLORIDE 500 MG: 500 TABLET, FILM COATED ORAL at 08:48

## 2018-02-16 RX ADMIN — OXYCODONE HYDROCHLORIDE 5 MG: 5 TABLET ORAL at 06:14

## 2018-02-16 RX ADMIN — BACLOFEN 15 MG: 10 TABLET ORAL at 08:48

## 2018-02-16 RX ADMIN — PANTOPRAZOLE SODIUM 40 MG: 40 INJECTION, POWDER, FOR SOLUTION INTRAVENOUS at 08:48

## 2018-02-16 RX ADMIN — OXYCODONE HYDROCHLORIDE 10 MG: 5 TABLET ORAL at 09:34

## 2018-02-16 RX ADMIN — CHOLECALCIFEROL CAP 125 MCG (5000 UNIT) 5000 UNITS: 125 CAP at 08:48

## 2018-02-16 RX ADMIN — OXYCODONE HYDROCHLORIDE 10 MG: 5 TABLET ORAL at 00:20

## 2018-02-16 ASSESSMENT — PAIN DESCRIPTION - DESCRIPTORS: DESCRIPTORS: ACHING;CONSTANT;DISCOMFORT

## 2018-02-16 NOTE — DISCHARGE INSTRUCTIONS
HOME CARE FOLLOWING LAPAROSCOPIC CHOLECYSTECTOMY  NEL Hong N. Guttormson, D. Maurer, R. O Donnell  Special instructions for Linda Grover:  --call [phone] to schedule drain removal for Monday 2/19.  You should take a pain pill 30 minutes prior to your drain removal.       INCISIONAL CARE:  Replace the bandage over your incisions until all drainage stops, or if more comfortable to have in place.  If present, leave the steri-strips (white paper tapes) in place for 14 days after surgery.  If Dermabond (a type of skin glue) is present, leave in place until it wears/flakes off.     BATHING:  Avoid baths for 1 week after surgery.  Showers are okay.  You may wash your hair at any time.  Gently pat your incisions dry after bathing.    ACTIVITY:  Light Activity -- you may immediately be up and about as tolerated.  Driving -- you may drive when comfortable and off narcotic pain medications.  Light Work -- resume when comfortable off pain medications.  (If you can drive, you probably can work.)  Strenuous Work/Activity -- limit lifting to 20 pounds for 1 week.  Progressively increase with time.  Active Sports (running, biking, etc.) -- cautiously resume after 2 weeks.    DISCOMFORT:  Use pain medications as prescribed by your surgeon.  Take the pain medication with some food, when possible, to minimize side effects.  Intermittent use of ice packs at the incision sites may help during the first 48 hours.  Expect gradual improvement.  You may experience shoulder pain, which is due to the air placed within your abdomen during the procedure.  This is temporary and usually passes within 2 days.    DIET:  Drink plenty of fluids.  While taking pain medications, increase dietary fiber or add a fiber supplementation like Metamucil or Citrucel to help prevent constipation - a possible side effect of pain medications.  It is not uncommon to experience some bowel changes (loose stools or constipation) after surgery.   Your body has to adapt to you no longer having a gall bladder.  To help minimize this side effect, avoid fatty foods for the first week after surgery.  You may then slowly increase the amount of fatty foods in your diet.      NAUSEA:  If nauseated from the anesthetic/pain meds; rest in bed, get up cautiously with assistance, and drink clear liquids (juice, tea, broth).    RETURN APPOINTMENT:  Schedule a follow-up visit 2-3 weeks post-op.  Office Phone:  [phone]     CONTACT US IF THE FOLLOWING DEVELOPS:   1. A fever that is above 101     2. If there is a large amount of drainage, bleeding, or swelling.   3. Severe pain that is not relieved by your prescription.   4. Drainage that is thick, cloudy, yellow, green or white.   5. Any other questions not answered by  Frequently Asked Questions  sheet.      FREQUENTLY ASKED QUESTIONS:    Q:  How should my incision look?    A:  Normally your incision will appear slightly swollen with light redness directly along the incision itself as it heals.  It may feel like a bump or ridge as the healing/scarring happens, and over time (3-4 months) this bump or ridge feeling should slowly go away.  In general, clear or pink watery drainage can be normal at first as your incision heals, but should decrease over time.    Q:  How do I know if my incision is infected?  A:  Look at your incision for signs of infection, like redness around the incision spreading to surrounding skin, or drainage of cloudy or foul-smelling drainage.  If you feel warm, check your temperature to see if you are running a fever.    **If any of these things occur, please notify the nurse at our office.  We may need you to come into the office for an incision check.      Q:  How do I take care of my incision?  A:  If you have a dressing in place - Starting the day after surgery, replace the dressing 1-2 times a day until there is no further drainage from the incision.  At that time, a dressing is no longer  needed.  Try to minimize tape on the skin if irritation is occurring at the tape sites.  If you have significant irritation from tape on the skin, please call the office to discuss other method of dressing your incision.    Small pieces of tape called  steri-strips  may be present directly overlying your incision; these may be removed 10 days after surgery unless otherwise specified by your surgeon.  If these tapes start to loosen at the ends, you may trim them back until they fall off or are removed.    A:  If you had  Dermabond  tissue glue used as a dressing (this causes your incision to look shiny with a clear covering over it) - This type of dressing wears off with time and does not require more dressings over the top unless it is draining around the glue as it wears off.  Do not apply ointments or lotions over the incisions until the glue has completely worn off.    Q:  There is a piece of tape or a sticky  lead  still on my skin.  Can I remove this?  A:  Sometimes the sticky  leads  used for monitoring during surgery or for evaluation in the emergency department are not all removed while you are in the hospital.  These sometimes have a tab or metal dot on them.  You can easily remove these on your own, like taking off a band-aid.  If there is a gel substance under the  lead , simply wipe/clean it off with a washcloth or paper towel.      Q:  What can I do to minimize constipation (very hard stools, or lack of stools)?  A:  Stay well hydrated.  Increase your dietary fiber intake or take a fiber supplement -with plenty of water.  Walk around frequently.  You may consider an over-the-counter stool-softener.  Your Pharmacist can assist you with choosing one that is stocked at your pharmacy.  Constipation is also one of the most common side effects of pain medication.  If you are using pain medication, be pro-active and try to PREVENT problems with constipation by taking the steps above BEFORE constipation becomes  a problem.    Q:  What do I do if I need more pain medications?  A:  Call the office to receive refills.  Be aware that certain pain meds cannot be called into a pharmacy and actually require a paper prescription.  A change may be made in your pain med as you progress thru your recovery period or if you have side effects to certain meds.    --Pain meds are NOT refilled after 5pm on weekdays, and NOT AT ALL on the weekends, so please look ahead to prevent problems.      Q:  Why am I having a hard time sleeping now that I am at home?  A:  Many medications you receive while you are in the hospital can impact your sleep for a number of days after your surgery/hospitalization.  Decreased level of activity and naps during the day may also make sleeping at night difficult.  Try to minimize day-time naps, and get up frequently during the day to walk around your home during your recovery time.  Sleep aides may be of some help, but are not recommended for long-term use.      Q:  I am having some back discomfort.  What should I do?  A:  This may be related to certain positioning that was required for your surgery, extended periods of time in bed, or other changes in your overall activity level.  You may try ice, heat, acetaminophen, or ibuprofen to treat this temporarily.  Note that many pain medications have acetaminophen in them and would state this on the prescription bottle.  Be sure not to exceed the maximum of 4000mg per day of acetaminophen.     **If the pain you are having does not resolve, is severe, or is a flare of back pain you have had on other occasions prior to surgery, please contact your primary physician for further recommendations or for an appointment to be examined at their office.    Q:  Why am I having headaches?  A:  Headaches can be caused by many things:  caffeine withdrawal, use of pain meds, dehydration, high blood pressure, lack of sleep, over-activity/exhaustion, flare-up of usual migraine  headaches.  If you feel this is related to muscle tension (a band-like feeling around the head, or a pressure at the low-back of the head) you may try ice or heat to this area.  You may need to drink more fluids (try electrolyte drink like Gatorade), rest, or take your usual migraine medications.   **If your headaches do not resolve, worsen, are accompanied by other symptoms, or if your blood pressure is high, please call your primary physician for recommendation and/or examination.    Q:  I am unable to urinate.  What do I do?  A:  A small percentage of people can have difficulty urinating initially after surgery.  This includes being able to urinate only a very small amount at a time and feeling discomfort or pressure in the very low abdomen.  This is called  urinary retention , and is actually an urgent situation.  Proceed to your nearest Emergency department for evaluation (not an Urgent Care Center).  Sometimes the bladder does not work correctly after certain medications you receive during surgery, or related to certain procedures.  You may need to have a catheter placed until your bladder recovers.  When planning to go to an Emergency department, it may help to call the ER to let them know you are coming in for this problem after a surgery.  This may help you get in quicker to be evaluated.  **If you have symptoms of a urinary tract infection, please contact your primary physician for the proper evaluation and treatment.          If you have other questions, please call the office Monday thru Friday between 8am and 5pm to discuss with the nurse or physician assistant.  #[phone]    There is a surgeon ON CALL on weekday evenings and over the weekend in case of urgent need only, and may be contacted at the same number.    If you are having an emergency, call 911 or proceed to your nearest emergency department.

## 2018-02-16 NOTE — PLAN OF CARE
Problem: Patient Care Overview  Goal: Plan of Care/Patient Progress Review  Outcome: Therapy, progress toward functional goals as expected  PRIMARY DIAGNOSIS: lap fady  OUTPATIENT/OBSERVATION GOALS TO BE MET BEFORE DISCHARGE:  1. Stable vital signs Yes. 2L NC. Dyspnea on exertion  2. Tolerating diet:Yes, full liquid  3. Pain controlled with oral pain medications:  Yes  4. Positive bowel sounds:  Yes, hypoactive  5. Voiding without difficulty:  Yes  6. Able to ambulate:  Yes  7. Provider specific discharge goals met:  Yes    Discharge Planner Nurse   Safe discharge environment identified: Yes  Barriers to discharge: Yes, bowel sounds, abdominal distention, wean O2.        Entered by: Alida Ruff 02/16/2018 2:16 AM     Please review provider order for any additional goals.   Nurse to notify provider when observation goals have been met and patient is ready for discharge.    VSS. 92% on 2L NC. On continuous pulse oximetry. Mild dyspnea on exertion noted. Afebrile. 5/10 generalized abdominal pain/discomfort. Well controlled with oral pain medication. Oxycodone x1. Abdomen distended but improving. Hypoactive bowel sounds. No gas since surgery. SBA. Will continue to monitor.

## 2018-02-16 NOTE — PLAN OF CARE
Problem: Patient Care Overview  Goal: Plan of Care/Patient Progress Review  PRIMARY DIAGNOSIS: s/p Laparoscopic Cholecystitis 2/15/18  OUTPATIENT/OBSERVATION GOALS TO BE MET BEFORE DISCHARGE:  1. Pain Status: Improved-controlled with oral & IV pain medications.    2. Return to near baseline physical activity: No; abdomen pain/discomfort with distention & Hypoactive bowel sounds.    3. Cleared for discharge by consultants (if involved): No; patient to be reassessed in AM (2/16) by providers.     Discharge Planner Nurse   Safe discharge environment identified: No; abdominal distention with pain/discomfort.  Barriers to discharge: Yes; pain & discomfort with distention in abdominal.       Entered by: RITCHIE MEZA 02/15/2018 8:58 PM     Please review provider order for any additional goals.   Nurse to notify provider when observation goals have been met and patient is ready for discharge.

## 2018-02-16 NOTE — DISCHARGE SUMMARY
[facility] Observation Unit Discharge Summary          Linda Grover MRN# [id number]   Age: 62 year old YOB: 1955     Date of Admission:  2/14/2018  Date of Discharge::  2/16/2018  Admitting Physician:  Freddy Callahan MD  Discharge Physician:  Sherri Puente PA-C  Primary Physician: No Ref-Primary, Physician     Primary Discharge Diagnoses:   Gangrenous Cholecystitis s/p laproscopic cholecystectomy 2/15/18.     Secondary Discharge Diagnoses:   HTN  Multiple Sclerosis  HLD  GERD     Hospital Course:   For detail history, please refer to H & P from 2/14/2018. In brief, this is a 62 year old male with a history of MS, HTN. HLD, and GERD who was admitted to observation with RUQ/epigastric abdominal pain thought to be c/w biliary colic.  General Surgery was consulted and patient underwent a laparoscopic cholecystectomy on 2/15/18 which revealed gangrenous cholecystitis.  A surgical drain was placed and will remain in place until seen by surgery for follow as an outpatient.  Patient tolerated a diet well.  His pain was controlled with oxycodone.  He will follow up with general surgery on 2/19/18.    Procedures/Imaging:     Results for orders placed or performed during the hospital encounter of 02/14/18   CT Chest/Abdomen/Pelvis w Contrast    Narrative    CT CHEST/ABDOMEN/PELVIS WITH CONTRAST  2/14/2018 1:59 PM     HISTORY: Chest and abdominal pain.     CONTRAST DOSE:  79 mL Isovue-370    Radiation dose for this scan was reduced using automated exposure  control, adjustment of the mA and/or kV according to patient size, or  iterative reconstruction technique.    FINDINGS:    Chest: There is a good contrast bolus within the pulmonary arteries.  There are no apparent pulmonary arterial filling defects to indicate  pulmonary embolism. There is no evidence of aortic dissection. The  mediastinum and shaun appear within normal limits. Mild linear  atelectasis or fibrosis is noted within the lung  bases. The lungs are  otherwise grossly clear. No pleural effusion or pneumothorax.    Abdomen/pelvis: Hepatic fatty infiltration is noted. There are several  small calcified stones dependently within the gallbladder. One small  stone is noted within the gallbladder neck. The kidneys, adrenal  glands, spleen, and pancreas appear within normal limits. There is a  normal-appearing appendix. Scattered colonic diverticulosis is noted.  There is no pericolonic inflammatory stranding. No evidence of bowel  obstruction. No free peritoneal fluid or air. Pelvic contents appear  within normal limits.      Impression    IMPRESSION:  1. Cholelithiasis with multiple small stones dependently in the  gallbladder. One stone is seen within the gallbladder neck.  2. No CT evidence of pulmonary embolism. Lungs appear clear.  3. Colonic diverticulosis without CT evidence of diverticulitis. No  other CT evidence of an acute inflammatory process in the abdomen or  pelvis.  4. Hepatic fatty infiltration--possible etiologies include consumption  of alcohol or excessive carbohydrate intake, especially  sugar/fructose.  Metabolic syndrome commonly occurs in combination  with nonalcoholic fatty liver disease. Although often reversible,  nonalcoholic fatty liver disease can progress to steatohepatitis and  cirrhosis.    MICKEY MACKAY MD   Abdomen US, limited (RUQ only)    Narrative    US ABDOMEN LIMITED 2/14/2018 3:40 PM     HISTORY: Abdominal pain.    FINDINGS: Multiple small gallstones are noted including within the  gallbladder neck. Gallbladder wall is borderline in thickness. No  biliary dilatation is demonstrated. The liver is increased in  echotexture compatible with fatty infiltration, also noted by CT. The  right kidney appeared within normal limits. The pancreas was  completely obscured by bowel gas.      Impression    IMPRESSION: Cholelithiasis and borderline gallbladder wall thickness.  No biliary dilatation. Hepatic fatty  "infiltration.    MICKEY MACKAY MD     Consultations:   General Surgery    Code Status:   Full    Allergies:   No Known Allergies     Subjective:   Patient reports abdominal bloating and generalized discomfort.  Ate breakfast and tolerated well.  Has not passed flatus.  No chest pain or shortness of breath.  Was able to ambulate.      Physical Exam:   Blood pressure 145/78, pulse 75, temperature 96.7  F (35.9  C), temperature source Oral, resp. rate 18, height 1.803 m (5' 11\"), weight 115.1 kg (253 lb 12.8 oz), SpO2 91 %. on room air  General: Alert, interactive, NAD, lying in bed  HEENT: AT/NC, sclera anicteric, PERRL, EOMI  Resp: clear to auscultation bilaterally, no crackles or wheezes  Cardiac: regular rate and rhythm, no murmur  Abdomen: Soft, diffuse mild tenderness, distended, +bowel sounds.  Incisions c/d/i.  RUQ drain with bloody output.  No rebound or guarding.  Extremities: No LE edema  Skin: Warm and dry, no jaundice or rash  Neuro: Alert & oriented x 3, no focal deficits, moves all extremities equally     Discharge Medicatios:        Discharge Medication List as of 2/16/2018 10:52 AM      START taking these medications    Details   oxyCODONE IR (ROXICODONE) 5 MG tablet Take 1-2 tablets (5-10 mg) by mouth every 4 hours as needed for other (pain control or improvement in physical function.), Disp-20 tablet, R-0, Local Print         CONTINUE these medications which have NOT CHANGED    Details   baclofen (LIORESAL) 10 MG tablet Take 15 mg by mouth 3 times daily, Historical      lisinopril (PRINIVIL/ZESTRIL) 20 MG tablet Take 20 mg by mouth At Bedtime, Historical      omeprazole (PRILOSEC) 20 MG CR capsule Take 20 mg by mouth daily, Historical      atorvastatin (LIPITOR) 40 MG tablet Take 40 mg by mouth At Bedtime, Historical      Multiple Vitamins-Minerals (MULTIVITAMIN ADULT) TABS Take 1 tablet by mouth daily, Historical      fish oil-omega-3 fatty acids 1000 MG capsule Take 1 g by mouth daily, Historical "      cholecalciferol (VITAMIN D3) 5000 UNITS CAPS capsule Take 5,000 Units by mouth daily, Historical      aspirin 81 MG EC tablet Take 81 mg by mouth daily, Historical      alemtuzumab 12 mg 5 day infusion once a year, Historical      valACYclovir (VALTREX) 500 MG tablet Take 500 mg by mouth 2 times daily , Historical             Instructions Given to Patient as Discharge:     Discharge Procedure Orders  Reason for your hospital stay   Order Comments: Registered to observation unit for abdominal pain and taken to OR for cholecystectomy.     Follow-up and recommended labs and tests    Order Comments: Follow up instructions per surgery team.     Activity   Order Comments: Activity per surgery team.   Order Specific Question Answer Comments   Is discharge order? Yes      Full Code     Diet   Order Comments: Diet advancement per surgery team.   Order Specific Question Answer Comments   Is discharge order? Yes          Pending Tests at Discharge:   Surgical pathology    Discharge Disposition:   Discharged to home     Sherri Puente MS, PA-C  Hospitalist Service  Pager [phone]    >30 minutes was spent in discharge planning, care coordination, physical examination and medication reconciliation on the date of discharge, 2/16/2018

## 2018-02-16 NOTE — PLAN OF CARE
Problem: Patient Care Overview  Goal: Plan of Care/Patient Progress Review  Outcome: Therapy, progress toward functional goals as expected  PRIMARY DIAGNOSIS: lap fady  OUTPATIENT/OBSERVATION GOALS TO BE MET BEFORE DISCHARGE:  1. Stable vital signs Yes. 2L NC. Dyspnea on exertion  2. Tolerating diet:Yes, full liquid  3. Pain controlled with oral pain medications:  Yes  4. Positive bowel sounds:  Yes, hypoactive  5. Voiding without difficulty:  Yes  6. Able to ambulate:  Yes  7. Provider specific discharge goals met:  Yes    Discharge Planner Nurse   Safe discharge environment identified: Yes  Barriers to discharge: Yes, bowel sounds, abdominal distention, wean O2.        Entered by: Alida Ruff 02/16/2018 5:16 AM     Please review provider order for any additional goals.   Nurse to notify provider when observation goals have been met and patient is ready for discharge.    VSS. 92% on 2L NC. On continuous pulse oximetry. Mild dyspnea on exertion noted. Afebrile. 5/10 generalized abdominal pain/discomfort. Well controlled with oral pain medication. Oxycodone x2. Abdomen distended but improving. Hypoactive bowel sounds. No gas since surgery. 5 ml dark red, brown output. SBA. Will continue to monitor.

## 2018-02-16 NOTE — PLAN OF CARE
Problem: Cholecystectomy (Adult)  Goal: Signs and Symptoms of Listed Potential Problems Will be Absent, Minimized or Managed (Cholecystectomy)  Signs and symptoms of listed potential problems will be absent, minimized or managed by discharge/transition of care (reference Cholecystectomy (Adult) CPG).   Outcome: Adequate for Discharge Date Met: 02/16/18  OBSERVATION patient END time: 1135     Discharge teaching done, questions answered. ANTHONY done. Narcotics given to pt. Left via private car to home.

## 2018-02-16 NOTE — PLAN OF CARE
Problem: Patient Care Overview  Goal: Plan of Care/Patient Progress Review  PRIMARY DIAGNOSIS: s/p Lap Marlene 2/15/2018  OUTPATIENT/OBSERVATION GOALS TO BE MET BEFORE DISCHARGE:  1. Pain Status: 7/10 pain & discomfort in distended abdomen    2. Return to near baseline physical activity: No; pain in abdomen restricting mobility. Hypoactive bowel sounds in All quadrants.    3. Cleared for discharge by consultants (if involved): No    Discharge Planner Nurse   Safe discharge environment identified: No; pain in abdominal continues s/p Lap Marlene 2/15/18  Barriers to discharge: Yes; Pain & discomfort limits mobility. Patient not passing Flatus. Abdomen distended.       Entered by: RICTHIE MEZA 02/15/2018 8:50 PM     Please review provider order for any additional goals.   Nurse to notify provider when observation goals have been met and patient is ready for discharge.

## 2018-02-16 NOTE — PROGRESS NOTES
"[facility]   General Surgery Progress Note           Assessment and Plan:   Assessment:   POD#1 s/p Procedure(s):  LAPAROSCOPIC CHOLECYSTECTOMY - Wound Class: II-Clean Contaminated        Plan:   -DC home today  -Rx Oxycodone done  -RTC Monday for drain removal         Interval History:   Comfortable in bed.  Pain controlled with PO meds.  + kevin solid diet.  + up to bathroom ok, voiding ok.  No flatus yet.         Physical Exam:   Blood pressure 154/77, pulse 75, temperature 98.2  F (36.8  C), temperature source Oral, resp. rate 18, height 1.803 m (5' 11\"), weight 115.1 kg (253 lb 12.8 oz), SpO2 91 %.    I/O last 3 completed shifts:  In: 2315 [P.O.:510; I.V.:1805]  Out: 2310 [Urine:2235; Drains:25; Blood:50]    Abdomen:   soft, distended, tenderness noted diffusely and normal bowel sounds   Inc(s) - clean, dry, intact      Hardik - dark brown and thin          Data:       Recent Labs  Lab 02/16/18  0525 02/15/18  0614 02/14/18  1245   HGB 12.2* 13.1* 14.5         Recent Labs  Lab 02/15/18  0614 02/14/18  1245   AST 21 27   ALT 32 42   ALKPHOS 88 108   BILITOTAL 1.0 0.6       Recent Labs  Lab 02/16/18  0525 02/15/18  0614 02/14/18  1245   WBC 14.4* 14.8* 7.6       Shantell Willingham PA-C       "

## 2018-02-19 ENCOUNTER — OFFICE VISIT (OUTPATIENT)
Dept: SURGERY | Facility: CLINIC | Age: 63
End: 2018-02-19
Payer: COMMERCIAL

## 2018-02-19 VITALS
HEART RATE: 64 BPM | WEIGHT: 253 LBS | RESPIRATION RATE: 16 BRPM | SYSTOLIC BLOOD PRESSURE: 134 MMHG | HEIGHT: 71 IN | OXYGEN SATURATION: 99 % | BODY MASS INDEX: 35.42 KG/M2 | DIASTOLIC BLOOD PRESSURE: 80 MMHG

## 2018-02-19 DIAGNOSIS — K81.0 ACUTE CHOLECYSTITIS: ICD-10-CM

## 2018-02-19 DIAGNOSIS — Z09 SURGICAL FOLLOWUP VISIT: Primary | ICD-10-CM

## 2018-02-19 PROCEDURE — 99024 POSTOP FOLLOW-UP VISIT: CPT | Performed by: SURGERY

## 2018-02-19 RX ORDER — OXYCODONE HYDROCHLORIDE 5 MG/1
5 TABLET ORAL EVERY 4 HOURS PRN
Qty: 15 TABLET | Refills: 0 | Status: SHIPPED | OUTPATIENT
Start: 2018-02-19 | End: 2018-11-14

## 2018-02-19 NOTE — MR AVS SNAPSHOT
"              After Visit Summary   2/19/2018    Linda Grover    MRN: [id number]           Patient Information     Date Of Birth          1955        Visit Information        Provider Department      2/19/2018 9:45 AM Rolo Solares MD [facility] General Surgery      Today's Diagnoses     Surgical followup visit    -  1    Acute cholecystitis           Follow-ups after your visit        Your next 10 appointments already scheduled     Feb 22, 2018  3:00 PM CST   Post Op with Julian Lenz PA-C   [facility] ([facility]    [address] [facility] [address]  [facility] [address] [phone]              Who to contact     If you have questions or need follow up information about today's clinic visit or your schedule please contact [facility] directly at [phone].  Normal or non-critical lab and imaging results will be communicated to you by MyChart, letter or phone within 4 business days after the clinic has received the results. If you do not hear from us within 7 days, please contact the clinic through [employer] or phone. If you have a critical or abnormal lab result, we will notify you by phone as soon as possible.  Submit refill requests through [employer] or call your pharmacy and they will forward the refill request to us. Please allow 3 business days for your refill to be completed.          Additional Information About Your Visit        MyChart Information     [employer] lets you send messages to your doctor, view your test results, renew your prescriptions, schedule appointments and more. To sign up, go to [email or web address] . Click on \"Log in\" on the left side of the screen, which will take you to the Welcome page. Then click on \"Sign up Now\" on the right side of the page.     You will be asked to enter the access code listed below, as well as some personal information. " "Please follow the directions to create your username and password.     Your access code is: [email or web address]  Expires: 2018  2:34 PM     Your access code will  in 90 days. If you need help or a new code, please call your [place] clinic or [phone].        Care EveryWhere ID     This is your Care EveryWhere ID. This could be used by other organizations to access your [place] medical records  [id number]        Your Vitals Were     Pulse Respirations Height Pulse Oximetry BMI (Body Mass Index)       64 16 1.803 m (5' 11\") 99% 35.29 kg/m2        Blood Pressure from Last 3 Encounters:   18 134/80   18 145/78   17 147/82    Weight from Last 3 Encounters:   18 114.8 kg (253 lb)   18 115.1 kg (253 lb 12.8 oz)   11/25/15 104.3 kg (230 lb)              Today, you had the following     No orders found for display         Today's Medication Changes          These changes are accurate as of 18 10:12 AM.  If you have any questions, ask your nurse or doctor.               These medicines have changed or have updated prescriptions.        Dose/Directions    * oxyCODONE IR 5 MG tablet   Commonly known as:  ROXICODONE   This may have changed:  Another medication with the same name was added. Make sure you understand how and when to take each.   Used for:  Biliary calculus of other site without obstruction   Changed by:  Rolo Solares MD        Dose:  5-10 mg   Take 1-2 tablets (5-10 mg) by mouth every 4 hours as needed for other (pain control or improvement in physical function.)   Quantity:  20 tablet   Refills:  0       * oxyCODONE IR 5 MG tablet   Commonly known as:  ROXICODONE   This may have changed:  You were already taking a medication with the same name, and this prescription was added. Make sure you understand how and when to take each.   Used for:  Acute cholecystitis   Changed by:  Rolo Solares MD        Dose:  5 mg   Take 1 tablet (5 mg) by mouth every 4 hours as " needed for pain   Quantity:  15 tablet   Refills:  0       * Notice:  This list has 2 medication(s) that are the same as other medications prescribed for you. Read the directions carefully, and ask your doctor or other care provider to review them with you.         Where to get your medicines      Some of these will need a paper prescription and others can be bought over the counter.  Ask your nurse if you have questions.     Bring a paper prescription for each of these medications     oxyCODONE IR 5 MG tablet                Primary Care Provider Fax #    Physician No Ref-Primary [phone]       No address on file        Equal Access to Services     [facility] : Hadujwan Glover, waaxace luqadaha, qaybfrancie kaalmaace zapien, ronal guevara . So [facility] [phone].    ATENCIÓN: Si habla español, tiene a aparicio disposición servicios gratuitos de asistencia lingüística. Llame al [phone].    We comply with applicable federal civil rights laws and [place] laws. We do not discriminate on the basis of race, color, national origin, age, disability, sex, sexual orientation, or gender identity.            Thank you!     Thank you for choosing [facility]  for your care. Our goal is always to provide you with excellent care. Hearing back from our patients is one way we can continue to improve our services. Please take a few minutes to complete the written survey that you may receive in the mail after your visit with us. Thank you!             Your Updated Medication List - Protect others around you: Learn how to safely use, store and throw away your medicines at www.disposemymeds.org.          This list is accurate as of 2/19/18 10:12 AM.  Always use your most recent med list.                   Brand Name Dispense Instructions for use Diagnosis    alemtuzumab 12 mg      5 day infusion once a year        aspirin 81 MG EC tablet      Take 81 mg by mouth daily         atorvastatin 40 MG tablet    LIPITOR     Take 40 mg by mouth At Bedtime        baclofen 10 MG tablet    LIORESAL     Take 15 mg by mouth 3 times daily        cholecalciferol 5000 UNITS Caps capsule    vitamin D3     Take 5,000 Units by mouth daily        fish oil-omega-3 fatty acids 1000 MG capsule      Take 1 g by mouth daily        lisinopril 20 MG tablet    PRINIVIL/ZESTRIL     Take 20 mg by mouth At Bedtime        MULTIVITAMIN ADULT Tabs      Take 1 tablet by mouth daily        omeprazole 20 MG CR capsule    priLOSEC     Take 20 mg by mouth daily        * oxyCODONE IR 5 MG tablet    ROXICODONE    20 tablet    Take 1-2 tablets (5-10 mg) by mouth every 4 hours as needed for other (pain control or improvement in physical function.)    Biliary calculus of other site without obstruction       * oxyCODONE IR 5 MG tablet    ROXICODONE    15 tablet    Take 1 tablet (5 mg) by mouth every 4 hours as needed for pain    Acute cholecystitis       valACYclovir 500 MG tablet    VALTREX     Take 500 mg by mouth 2 times daily        * Notice:  This list has 2 medication(s) that are the same as other medications prescribed for you. Read the directions carefully, and ask your doctor or other care provider to review them with you.

## 2018-02-19 NOTE — PROGRESS NOTES
The patient returns to follow-up after his recent laparoscopic cholecystectomy for gangrenous cholecystitis.  He still has a drain in place.  This is putting about 30 cc a day out.  This is quite dark in color.  Overall, the patient is feeling fairly well.  His appetite is returning.  He is still taking occasional pain pills.    The patient's incisions are healing well.  There is a bit of dry drainage on the drain dressing.  The drain output has some old blood in it as well as some dark color likely related to the oxidized cellulose hemostatic agent.  There is no obvious bile, but it is difficult to determine due to the dark color of the fluid.    Overall, the patient is doing well.  At this point, I think it is fairly unlikely that he is having any bile leak, but it is difficult to be sure.  I given the option of removing the drain today or leaving it in a few more days.  I think if we leave it in a few more days, he will be less likely to have any issues relating to bile leakage.  The patient will return to see either a PA on Thursday or me on Friday.  I anticipate drain removal at that time, unless it is clearly bilious.  Pathology results were reviewed today.    I will refill 15 oxycodone today.    Rolo Solares MD  [facility]    Please route or send letter to:  None

## 2018-02-22 ENCOUNTER — OFFICE VISIT (OUTPATIENT)
Dept: SURGERY | Facility: CLINIC | Age: 63
End: 2018-02-22
Payer: COMMERCIAL

## 2018-02-22 VITALS
WEIGHT: 253 LBS | SYSTOLIC BLOOD PRESSURE: 134 MMHG | DIASTOLIC BLOOD PRESSURE: 80 MMHG | OXYGEN SATURATION: 95 % | BODY MASS INDEX: 35.42 KG/M2 | RESPIRATION RATE: 16 BRPM | HEIGHT: 71 IN | HEART RATE: 74 BPM

## 2018-02-22 DIAGNOSIS — Z09 SURGICAL FOLLOWUP VISIT: Primary | ICD-10-CM

## 2018-02-22 PROCEDURE — 99024 POSTOP FOLLOW-UP VISIT: CPT | Performed by: PHYSICIAN ASSISTANT

## 2018-02-22 NOTE — PROGRESS NOTES
[facility] Note     Subjective:  Linda Grover is here for drain removal. He underwent a laparoscopic cholecystectomy by Dr. Solares on 2/15/18. He saw Dr. Solares earlier this week and decided to keep the drain in for a few more days. He is ready for it to be removed today.    Objective:  Abd - soft, non-tender, non-distended  Inc - c/d/i, no erythema, +healing ridge, no masses  Drain - clear and dark output, hemostatic agent likely making it look dark,  No bile seen when rotating bulb, no foam with shaking. Drain was thus removed without complications.    Assessment:  S/p laparoscopic cholecystectomy. The pathology confirms acute cholecystitis and cholelithiasis.    Plan:  Continue low-fat diet for a few weeks  RTC PRN      Julian Lenz PA-C  2/22/2018      Please route or send letter to:  *None*

## 2018-02-22 NOTE — MR AVS SNAPSHOT
"              After Visit Summary   2018    Linda Grover    MRN: [id number]           Patient Information     Date Of Birth          1955        Visit Information        Provider Department      2018 3:00 PM Julian Lenz PA-C [facility] General Surgery      Today's Diagnoses     Surgical followup visit    -  1       Follow-ups after your visit        Follow-up notes from your care team     Return if symptoms worsen or fail to improve.      Who to contact     If you have questions or need follow up information about today's clinic visit or your schedule please contact [facility] directly at [phone].  Normal or non-critical lab and imaging results will be communicated to you by [employer], letter or phone within 4 business days after the clinic has received the results. If you do not hear from us within 7 days, please contact the clinic through [employer] or phone. If you have a critical or abnormal lab result, we will notify you by phone as soon as possible.  Submit refill requests through [employer] or call your pharmacy and they will forward the refill request to us. Please allow 3 business days for your refill to be completed.          Additional Information About Your Visit        MyChart Information     [employer] lets you send messages to your doctor, view your test results, renew your prescriptions, schedule appointments and more. To sign up, go to [email or web address] . Click on \"Log in\" on the left side of the screen, which will take you to the Welcome page. Then click on \"Sign up Now\" on the right side of the page.     You will be asked to enter the access code listed below, as well as some personal information. Please follow the directions to create your username and password.     Your access code is: [email or web address]  Expires: 2018  2:34 PM     Your access code will  in 90 days. If you need help or a new " "code, please call your [place] clinic or [phone].        Care EveryWhere ID     This is your Care EveryWhere ID. This could be used by other organizations to access your [place] medical records  [id number]        Your Vitals Were     Pulse Respirations Height Pulse Oximetry BMI (Body Mass Index)       74 16 5' 11\" (1.803 m) 95% 35.29 kg/m2        Blood Pressure from Last 3 Encounters:   02/22/18 134/80   02/19/18 134/80   02/16/18 145/78    Weight from Last 3 Encounters:   02/22/18 253 lb (114.8 kg)   02/19/18 253 lb (114.8 kg)   02/14/18 253 lb 12.8 oz (115.1 kg)              Today, you had the following     No orders found for display       Primary Care Provider Fax #    Physician No Ref-Primary [phone]       No address on file        Equal Access to Services     [facility]: Hadii austin Glover, waaxda jenise, qaybta kaalmada curry, ronal guevara . So [facility] [phone].    ATENCIÓN: Si habla español, tiene a aparicio disposición servicios gratuitos de asistencia lingüística. Harris al [phone].    We comply with applicable federal civil rights laws and [place] laws. We do not discriminate on the basis of race, color, national origin, age, disability, sex, sexual orientation, or gender identity.            Thank you!     Thank you for choosing [facility]  for your care. Our goal is always to provide you with excellent care. Hearing back from our patients is one way we can continue to improve our services. Please take a few minutes to complete the written survey that you may receive in the mail after your visit with us. Thank you!             Your Updated Medication List - Protect others around you: Learn how to safely use, store and throw away your medicines at www.disposemymeds.org.          This list is accurate as of 2/22/18  3:20 PM.  Always use your most recent med list.                   Brand Name Dispense Instructions for use " Diagnosis    alemtuzumab 12 mg      5 day infusion once a year        aspirin 81 MG EC tablet      Take 81 mg by mouth daily        atorvastatin 40 MG tablet    LIPITOR     Take 40 mg by mouth At Bedtime        baclofen 10 MG tablet    LIORESAL     Take 15 mg by mouth 3 times daily        cholecalciferol 5000 UNITS Caps capsule    vitamin D3     Take 5,000 Units by mouth daily        fish oil-omega-3 fatty acids 1000 MG capsule      Take 1 g by mouth daily        lisinopril 20 MG tablet    PRINIVIL/ZESTRIL     Take 20 mg by mouth At Bedtime        MULTIVITAMIN ADULT Tabs      Take 1 tablet by mouth daily        omeprazole 20 MG CR capsule    priLOSEC     Take 20 mg by mouth daily        * oxyCODONE IR 5 MG tablet    ROXICODONE    20 tablet    Take 1-2 tablets (5-10 mg) by mouth every 4 hours as needed for other (pain control or improvement in physical function.)    Biliary calculus of other site without obstruction       * oxyCODONE IR 5 MG tablet    ROXICODONE    15 tablet    Take 1 tablet (5 mg) by mouth every 4 hours as needed for pain    Acute cholecystitis       valACYclovir 500 MG tablet    VALTREX     Take 500 mg by mouth 2 times daily        * Notice:  This list has 2 medication(s) that are the same as other medications prescribed for you. Read the directions carefully, and ask your doctor or other care provider to review them with you.

## 2018-04-07 ENCOUNTER — MEDICAL CORRESPONDENCE (OUTPATIENT)
Dept: HEALTH INFORMATION MANAGEMENT | Facility: CLINIC | Age: 63
End: 2018-04-07

## 2018-07-07 DIAGNOSIS — G35 MULTIPLE SCLEROSIS (H): Primary | ICD-10-CM

## 2018-07-07 RX ORDER — DIPHENHYDRAMINE HYDROCHLORIDE 50 MG/ML
25 INJECTION INTRAMUSCULAR; INTRAVENOUS EVERY 6 HOURS PRN
Status: CANCELLED | OUTPATIENT
Start: 2018-09-24

## 2018-07-07 RX ORDER — EPINEPHRINE 0.3 MG/.3ML
0.3 INJECTION SUBCUTANEOUS
Status: CANCELLED | OUTPATIENT
Start: 2018-09-24

## 2018-07-07 RX ORDER — DIPHENHYDRAMINE HCL 25 MG
25 CAPSULE ORAL ONCE
Status: CANCELLED | OUTPATIENT
Start: 2018-09-24

## 2018-07-07 RX ORDER — ACETAMINOPHEN 500 MG
1000 TABLET ORAL EVERY 4 HOURS PRN
Status: CANCELLED | OUTPATIENT
Start: 2018-09-24

## 2018-07-07 RX ORDER — HYDROXYZINE PAMOATE 50 MG/1
50 CAPSULE ORAL
Status: CANCELLED
Start: 2018-09-24

## 2018-07-07 RX ORDER — PROMETHAZINE HYDROCHLORIDE 25 MG/ML
25 INJECTION, SOLUTION INTRAMUSCULAR; INTRAVENOUS
Status: CANCELLED
Start: 2018-09-24

## 2018-07-07 RX ORDER — ACETAMINOPHEN 500 MG
1000 TABLET ORAL ONCE
Status: CANCELLED | OUTPATIENT
Start: 2018-09-24

## 2018-07-07 RX ORDER — IBUPROFEN 200 MG
800 TABLET ORAL EVERY 6 HOURS PRN
Status: CANCELLED | OUTPATIENT
Start: 2018-09-24

## 2018-07-07 RX ORDER — ONDANSETRON 2 MG/ML
8 INJECTION INTRAMUSCULAR; INTRAVENOUS EVERY 8 HOURS PRN
Status: CANCELLED
Start: 2018-09-24

## 2018-07-07 RX ORDER — HYDROXYZINE PAMOATE 50 MG/1
50 CAPSULE ORAL ONCE
Status: CANCELLED
Start: 2018-09-24 | End: 2018-09-24

## 2018-07-07 RX ORDER — MEPERIDINE HYDROCHLORIDE 50 MG/1
50 TABLET ORAL EVERY 4 HOURS PRN
Status: CANCELLED
Start: 2018-09-24

## 2018-07-07 RX ORDER — MEPERIDINE HYDROCHLORIDE 50 MG/1
50 TABLET ORAL ONCE
Status: CANCELLED
Start: 2018-09-24 | End: 2018-09-24

## 2018-07-26 ENCOUNTER — TRANSFERRED RECORDS (OUTPATIENT)
Dept: HEALTH INFORMATION MANAGEMENT | Facility: CLINIC | Age: 63
End: 2018-07-26

## 2018-07-26 LAB
CREAT SERPL-MCNC: 0.7 MG/DL (ref 0.76–1.27)
GFR SERPL CREATININE-BSD FRML MDRD: 101 ML/MIN/1.73
HEP C HIM: NORMAL
TSH SERPL-ACNC: 2.74 UIU/ML (ref 0.45–4.5)

## 2018-11-01 ENCOUNTER — MEDICAL CORRESPONDENCE (OUTPATIENT)
Dept: HEALTH INFORMATION MANAGEMENT | Facility: CLINIC | Age: 63
End: 2018-11-01

## 2018-11-14 ENCOUNTER — INFUSION THERAPY VISIT (OUTPATIENT)
Dept: INFUSION THERAPY | Facility: CLINIC | Age: 63
End: 2018-11-14
Attending: PSYCHIATRY & NEUROLOGY
Payer: COMMERCIAL

## 2018-11-14 VITALS
DIASTOLIC BLOOD PRESSURE: 86 MMHG | HEART RATE: 90 BPM | SYSTOLIC BLOOD PRESSURE: 140 MMHG | RESPIRATION RATE: 18 BRPM | OXYGEN SATURATION: 94 % | TEMPERATURE: 98.3 F

## 2018-11-14 DIAGNOSIS — G35 MULTIPLE SCLEROSIS (H): Primary | ICD-10-CM

## 2018-11-14 PROCEDURE — 25000132 ZZH RX MED GY IP 250 OP 250 PS 637: Performed by: PSYCHIATRY & NEUROLOGY

## 2018-11-14 PROCEDURE — 96365 THER/PROPH/DIAG IV INF INIT: CPT

## 2018-11-14 PROCEDURE — 96367 TX/PROPH/DG ADDL SEQ IV INF: CPT

## 2018-11-14 PROCEDURE — 25000128 H RX IP 250 OP 636: Performed by: PSYCHIATRY & NEUROLOGY

## 2018-11-14 PROCEDURE — 96366 THER/PROPH/DIAG IV INF ADDON: CPT

## 2018-11-14 RX ORDER — DIPHENHYDRAMINE HCL 25 MG
25 CAPSULE ORAL ONCE
Status: CANCELLED | OUTPATIENT
Start: 2018-11-14

## 2018-11-14 RX ORDER — ACETAMINOPHEN 500 MG
1000 TABLET ORAL ONCE
Status: CANCELLED | OUTPATIENT
Start: 2018-11-14

## 2018-11-14 RX ORDER — ONDANSETRON 2 MG/ML
8 INJECTION INTRAMUSCULAR; INTRAVENOUS EVERY 8 HOURS PRN
Status: CANCELLED
Start: 2018-11-14

## 2018-11-14 RX ORDER — IBUPROFEN 200 MG
800 TABLET ORAL EVERY 6 HOURS PRN
Status: CANCELLED | OUTPATIENT
Start: 2018-11-14

## 2018-11-14 RX ORDER — EPINEPHRINE 0.3 MG/.3ML
0.3 INJECTION SUBCUTANEOUS
Status: CANCELLED | OUTPATIENT
Start: 2018-11-14

## 2018-11-14 RX ORDER — MEPERIDINE HYDROCHLORIDE 50 MG/1
50 TABLET ORAL EVERY 4 HOURS PRN
COMMUNITY
End: 2022-12-13

## 2018-11-14 RX ORDER — MEPERIDINE HYDROCHLORIDE 50 MG/1
50 TABLET ORAL EVERY 4 HOURS PRN
Status: CANCELLED
Start: 2018-11-14

## 2018-11-14 RX ORDER — ACETAMINOPHEN 500 MG
1000 TABLET ORAL ONCE
Status: COMPLETED | OUTPATIENT
Start: 2018-11-14 | End: 2018-11-14

## 2018-11-14 RX ORDER — PROMETHAZINE HYDROCHLORIDE 25 MG/ML
25 INJECTION, SOLUTION INTRAMUSCULAR; INTRAVENOUS
Status: CANCELLED
Start: 2018-11-14

## 2018-11-14 RX ORDER — MEPERIDINE HYDROCHLORIDE 50 MG/1
50 TABLET ORAL ONCE
Status: CANCELLED
Start: 2018-11-14 | End: 2018-11-14

## 2018-11-14 RX ORDER — HYDROXYZINE PAMOATE 50 MG/1
50 CAPSULE ORAL ONCE
Status: CANCELLED
Start: 2018-11-14 | End: 2018-11-14

## 2018-11-14 RX ORDER — HYDROXYZINE PAMOATE 50 MG/1
50 CAPSULE ORAL
Status: CANCELLED
Start: 2018-11-14

## 2018-11-14 RX ORDER — DIPHENHYDRAMINE HYDROCHLORIDE 50 MG/ML
25 INJECTION INTRAMUSCULAR; INTRAVENOUS EVERY 6 HOURS PRN
Status: CANCELLED | OUTPATIENT
Start: 2018-11-14

## 2018-11-14 RX ORDER — ACETAMINOPHEN 500 MG
1000 TABLET ORAL EVERY 4 HOURS PRN
Status: CANCELLED | OUTPATIENT
Start: 2018-11-14

## 2018-11-14 RX ADMIN — RENAGEL 12 MG: 400 TABLET ORAL at 10:07

## 2018-11-14 RX ADMIN — SODIUM CHLORIDE 1000 MG: 9 INJECTION, SOLUTION INTRAVENOUS at 08:59

## 2018-11-14 RX ADMIN — ACETAMINOPHEN 1000 MG: 500 TABLET ORAL at 08:59

## 2018-11-14 ASSESSMENT — PAIN SCALES - GENERAL: PAINLEVEL: NO PAIN (0)

## 2018-11-14 NOTE — PATIENT INSTRUCTIONS
Thank you for choosing [facility]. The following information is a summary of your appointment as well as important reminders:      Last dose Tylenol was 9am  Last dose Ibuprofen was none today.  Last dose Benadryl was none today.    Care for yourself after your infusions:  1.  Notify your doctor or seek appropriate medical attention as soon as possible if you experience any discomfort or anything that feels out of the ordinary--including swelling in your mouth or throat, trouble breathing, weakness; fast, slow or irregular heartbeat, chest pain or rash.    2.  Please continue all post infusion medications as prescribed by your healthcare provider.  3.  Stay hydrated.  Be sure to drink plenty of water.  4.  Rest.  You may feel tired, so take it easy and don't overexert yourself.  5.  Eat your regular meals and usual portion sizes as tolerated.   6.  Be mindful of infections and ways to help reduce your risk.      We look forward in seeing you on your next appointment here at [facility].  Please don t hesitate to call us at [phone] to reschedule any of your appointments or to speak with one of our registered nurses.  It was a pleasure taking care of you today.    Sincerely,    [facility]  [facility]  [address]  [place] [address]  Phone: [phone]

## 2018-11-14 NOTE — PROGRESS NOTES
[facility] Infusion Nursing Note:  Linda Grover presents today for lemtrada day 1 of 3    Patient seen by provider today: No   present during visit today: Not Applicable.    Note: Patient took home supply of vistaril, zantac and demerol.    Intravenous Access:  Peripheral IV placed.    Pre infusion Assessment:    1. Is patient authorized and have they received the Lemtrada treatment and infusion reactions patient guide? YES.  2. If ordered, has patient taken pre-medications? YES.  3. Does patient have any of the following signs of infection: fever, cough, sore throat, wounds, UTI symptoms? YES.  4. Is patient taking any oral, IV or topical anti-infective medication? NO.  5. Has patient been in contact with anyone with TB? NO.  6. Has patient traveled outside the country in the last 21 days? NO.  7. Has patient had a flu shot or vaccine in the last 6 weeks? NO  8. Plan~ A. Therapy is appropriate, will proceed with treatment today?                                        YES.      B. Medication will be held today and patient will be re-assessed at a later                      Date: NO.    Lem[facility]ada - REMS requirements:    9. Lem[facility]ada REMS infusion checklist reviewed and proper information documented: YES.  10. [facility] REMS infusion checklist completed online daily and submitted after FINAL infusion: YES.  11. Post-infusion instructions reviewed and given to patient: YES.  15. Adverse reactions reported to: N/A.      Post Infusion Assessment:  Patient tolerated infusion without incident.  Patient observed for 120 minutes post lemtrada per protocol.  Site patent and intact, free from redness, edema or discomfort.  No evidence of extravasations.    Discharge Plan:   Copy of AVS reviewed with patient and/or family.  Patient will return 11/15 for next appointment.  Patient discharged in stable condition accompanied by: wife.  Departure Mode: Ambulatory.    Dakota Rees RN

## 2018-11-14 NOTE — MR AVS SNAPSHOT
After Visit Summary   11/14/2018    Linda Grover    MRN: [id number]           Patient Information     Date Of Birth          1955        Visit Information        Provider Department      11/14/2018 8:30 AM  INFUSION CHAIR 12 [facility]        Today's Diagnoses     Multiple sclerosis (H)    -  1      Care Instructions    Thank you for choosing [facility] [place]. The following information is a summary of your appointment as well as important reminders:      Last dose Tylenol was 9am  Last dose Ibuprofen was none today.  Last dose Benadryl was none today.    Care for yourself after your infusions:  1.  Notify your doctor or seek appropriate medical attention as soon as possible if you experience any discomfort or anything that feels out of the ordinary--including swelling in your mouth or throat, trouble breathing, weakness; fast, slow or irregular heartbeat, chest pain or rash.    2.  Please continue all post infusion medications as prescribed by your healthcare provider.  3.  Stay hydrated.  Be sure to drink plenty of water.  4.  Rest.  You may feel tired, so take it easy and don't overexert yourself.  5.  Eat your regular meals and usual portion sizes as tolerated.   6.  Be mindful of infections and ways to help reduce your risk.      We look forward in seeing you on your next appointment here at [facility].  Please don t hesitate to call us at [phone] to reschedule any of your appointments or to speak with one of our registered nurses.  It was a pleasure taking care of you today.    Sincerely,    [facility]  [facility]  [address] [facility] [address] [place] [address]  Phone: [phone]            Follow-ups after your visit        Follow-up notes from your care team     Return in 1 day (on 11/15/2018).      Your next 10  appointments already scheduled     Nov 15, 2018  8:30 AM CST   Level 7 with SH INFUSION CHAIR 10   [facility] ([facility])    [facility] [address]  [place] [id number] [phone]            Nov 16, 2018  8:30 AM CST   Level 7 with SH INFUSION CHAIR 12   [facility] ([facility])    [facility] [place] [address]  [place] [id number] [phone]              Who to contact     If you have questions or need follow up information about today's clinic visit or your schedule please contact [facility] directly at [phone].  Normal or non-critical lab and imaging results will be communicated to you by MyChart, letter or phone within 4 business days after the clinic has received the results. If you do not hear from us within 7 days, please contact the clinic through MyChart or phone. If you have a critical or abnormal lab result, we will notify you by phone as soon as possible.  Submit refill requests through [employer] or call your pharmacy and they will forward the refill request to us. Please allow 3 business days for your refill to be completed.          Additional Information About Your Visit        Care EveryWhere ID     This is your Care EveryWhere ID. This could be used by other organizations to access your [facility] medical records  [id number]        Your Vitals Were     Pulse Temperature Respirations Pulse Oximetry          79 98  F (36.7  C) (Oral) 18 94%         Blood Pressure from Last 3 Encounters:   11/14/18 127/71   02/22/18 134/80   02/19/18 134/80    Weight from Last 3 Encounters:   02/22/18 114.8 kg (253 lb)   02/19/18 114.8 kg (253 lb)   02/14/18 115.1 kg (253 lb 12.8 oz)              Today, you had the following     No orders found for display      Information about OPIOIDS     PRESCRIPTION OPIOIDS:  WHAT YOU NEED TO KNOW   We gave you an opioid (narcotic) pain medicine. It is important to manage your pain, but opioids are not always the best choice. You should first try all the other options your care team gave you. Take this medicine for as short a time (and as few doses) as possible.    Some activities can increase your pain, such as bandage changes or therapy sessions. It may help to take your pain medicine 30 to 60 minutes before these activities. Reduce your stress by getting enough sleep, working on hobbies you enjoy and practicing relaxation or meditation. Talk to your care team about ways to manage your pain beyond prescription opioids.    These medicines have risks:    DO NOT drive when on new or higher doses of pain medicine. These medicines can affect your alertness and reaction times, and you could be arrested for driving under the influence (DUI). If you need to use opioids long-term, talk to your care team about driving.    DO NOT operate heavy machinery    DO NOT do any other dangerous activities while taking these medicines.    DO NOT drink any alcohol while taking these medicines.     If the opioid prescribed includes acetaminophen, DO NOT take with any other medicines that contain acetaminophen. Read all labels carefully. Look for the word  acetaminophen  or  Tylenol.  Ask your pharmacist if you have questions or are unsure.    You can get addicted to pain medicines, especially if you have a history of addiction (chemical, alcohol or substance dependence). Talk to your care team about ways to reduce this risk.    All opioids tend to cause constipation. Drink plenty of water and eat foods that have a lot of fiber, such as fruits, vegetables, prune juice, apple juice and high-fiber cereal. Take a laxative (Miralax, milk of magnesia, Colace, Senna) if you don t move your bowels at least every other day. Other side effects include upset stomach, sleepiness, dizziness, throwing up, tolerance  (needing more of the medicine to have the same effect), physical dependence and slowed breathing.    Store your pills in a secure place, locked if possible. We will not replace any lost or stolen medicine. If you don t finish your medicine, please throw away (dispose) as directed by your pharmacist. The [place] Pollution [employer] has more information about safe disposal: https://www.pca.[facility].[place].us/living-green/[email or web address]-unwanted-medications         Primary Care Provider Fax #    Physician No Ref-Primary [phone]       No address on file        Equal Access to Services     ISIDRO SEVILLA : Roshan paul Soze, waaxda luqadaha, qaybta kaalmada curry, ronal guevara . So [facility] [phone].    ATENCIÓN: Si habla español, tiene a aparicio disposición servicios gratuitos de asistencia lingüística. Harris al [phone].    We comply with applicable federal civil rights laws and [place] laws. We do not discriminate on the basis of race, color, national origin, age, disability, sex, sexual orientation, or gender identity.            Thank you!     Thank you for choosing [facility]  for your care. Our goal is always to provide you with excellent care. Hearing back from our patients is one way we can continue to improve our services. Please take a few minutes to complete the written survey that you may receive in the mail after your visit with us. Thank you!             Your Updated Medication List - Protect others around you: Learn how to safely use, store and throw away your medicines at www.disposemymeds.org.          This list is accurate as of 11/14/18  1:53 PM.  Always use your most recent med list.                   Brand Name Dispense Instructions for use Diagnosis    alemtuzumab 12 mg      3 day infusion on 11-14-11/16/2018        aspirin 81 MG EC tablet      Take 81 mg by mouth daily        atorvastatin 40 MG tablet    LIPITOR     Take 40  mg by mouth At Bedtime        baclofen 10 MG tablet    LIORESAL     Take 15 mg by mouth 3 times daily        cholecalciferol 5000 units (125 mcg) Caps capsule    vitamin D3     Take 7,000 Units by mouth daily        fish oil-omega-3 fatty acids 1000 MG capsule      Take 1 g by mouth daily        lisinopril 20 MG tablet    PRINIVIL/ZESTRIL     Take 20 mg by mouth At Bedtime        meperidine 50 MG tablet    DEMEROL     Take 50 mg by mouth every 4 hours as needed        MULTIVITAMIN ADULT Tabs      Take 1 tablet by mouth daily        omeprazole 20 MG CR capsule    priLOSEC     Take 20 mg by mouth daily        valACYclovir 500 MG tablet    VALTREX     Take 500 mg by mouth 2 times daily        VISTARIL PO      Take 50 mg by mouth 3 times daily        ZANTAC PO      Take 150 mg by mouth daily

## 2018-11-15 ENCOUNTER — INFUSION THERAPY VISIT (OUTPATIENT)
Dept: INFUSION THERAPY | Facility: CLINIC | Age: 63
End: 2018-11-15
Attending: PSYCHIATRY & NEUROLOGY
Payer: COMMERCIAL

## 2018-11-15 VITALS
OXYGEN SATURATION: 95 % | RESPIRATION RATE: 16 BRPM | SYSTOLIC BLOOD PRESSURE: 131 MMHG | DIASTOLIC BLOOD PRESSURE: 77 MMHG | HEART RATE: 81 BPM | TEMPERATURE: 97.8 F

## 2018-11-15 DIAGNOSIS — G35 MULTIPLE SCLEROSIS (H): Primary | ICD-10-CM

## 2018-11-15 PROCEDURE — 25000132 ZZH RX MED GY IP 250 OP 250 PS 637: Performed by: PSYCHIATRY & NEUROLOGY

## 2018-11-15 PROCEDURE — 96365 THER/PROPH/DIAG IV INF INIT: CPT

## 2018-11-15 PROCEDURE — 96366 THER/PROPH/DIAG IV INF ADDON: CPT

## 2018-11-15 PROCEDURE — 25000128 H RX IP 250 OP 636: Performed by: PSYCHIATRY & NEUROLOGY

## 2018-11-15 PROCEDURE — 96367 TX/PROPH/DG ADDL SEQ IV INF: CPT

## 2018-11-15 RX ORDER — DIPHENHYDRAMINE HYDROCHLORIDE 50 MG/ML
25 INJECTION INTRAMUSCULAR; INTRAVENOUS EVERY 6 HOURS PRN
Status: CANCELLED | OUTPATIENT
Start: 2018-11-15

## 2018-11-15 RX ORDER — HYDROXYZINE PAMOATE 50 MG/1
50 CAPSULE ORAL
Status: CANCELLED
Start: 2018-11-15

## 2018-11-15 RX ORDER — ACETAMINOPHEN 500 MG
1000 TABLET ORAL EVERY 4 HOURS PRN
Status: CANCELLED | OUTPATIENT
Start: 2018-11-15

## 2018-11-15 RX ORDER — MEPERIDINE HYDROCHLORIDE 50 MG/1
50 TABLET ORAL EVERY 4 HOURS PRN
Status: CANCELLED
Start: 2018-11-15

## 2018-11-15 RX ORDER — IBUPROFEN 200 MG
800 TABLET ORAL EVERY 6 HOURS PRN
Status: CANCELLED | OUTPATIENT
Start: 2018-11-15

## 2018-11-15 RX ORDER — HYDROXYZINE PAMOATE 50 MG/1
50 CAPSULE ORAL ONCE
Status: CANCELLED
Start: 2018-11-15 | End: 2018-11-15

## 2018-11-15 RX ORDER — ONDANSETRON 2 MG/ML
8 INJECTION INTRAMUSCULAR; INTRAVENOUS EVERY 8 HOURS PRN
Status: CANCELLED
Start: 2018-11-15

## 2018-11-15 RX ORDER — MEPERIDINE HYDROCHLORIDE 50 MG/1
50 TABLET ORAL ONCE
Status: CANCELLED
Start: 2018-11-15 | End: 2018-11-15

## 2018-11-15 RX ORDER — DIPHENHYDRAMINE HCL 25 MG
25 CAPSULE ORAL ONCE
Status: CANCELLED | OUTPATIENT
Start: 2018-11-15

## 2018-11-15 RX ORDER — ACETAMINOPHEN 500 MG
1000 TABLET ORAL ONCE
Status: CANCELLED | OUTPATIENT
Start: 2018-11-15

## 2018-11-15 RX ORDER — ACETAMINOPHEN 500 MG
1000 TABLET ORAL ONCE
Status: COMPLETED | OUTPATIENT
Start: 2018-11-15 | End: 2018-11-15

## 2018-11-15 RX ORDER — PROMETHAZINE HYDROCHLORIDE 25 MG/ML
25 INJECTION, SOLUTION INTRAMUSCULAR; INTRAVENOUS
Status: CANCELLED
Start: 2018-11-15

## 2018-11-15 RX ORDER — EPINEPHRINE 0.3 MG/.3ML
0.3 INJECTION SUBCUTANEOUS
Status: CANCELLED | OUTPATIENT
Start: 2018-11-15

## 2018-11-15 RX ADMIN — ACETAMINOPHEN 1000 MG: 500 TABLET ORAL at 08:51

## 2018-11-15 RX ADMIN — RENAGEL 12 MG: 400 TABLET ORAL at 09:56

## 2018-11-15 RX ADMIN — SODIUM CHLORIDE 1000 MG: 9 INJECTION, SOLUTION INTRAVENOUS at 08:52

## 2018-11-15 ASSESSMENT — PAIN SCALES - GENERAL: PAINLEVEL: MILD PAIN (2)

## 2018-11-15 NOTE — PROGRESS NOTES
[facility] Infusion Nursing Note:  Linda Grover presents today for Day 2 [facility].    Patient seen by provider today: No   present during visit today: Not Applicable.    Note: Pt reports feeling well today except for a mild headache.  Pt denies taking any oral medication for the headache.    Intravenous Access:  Peripheral IV in place from yesterday.  Flushes well, positive blood return.    Pre infusion Assessment:    1. Is patient authorized and have they received the [facility]ada treatment and infusion reactions patient guide? YES.  2. If ordered, has patient taken pre-medications? YES.  3. Does patient have any of the following signs of infection: fever, cough, sore throat, wounds, UTI symptoms? NO.  4. Is patient taking any oral, IV or topical anti-infective medication? NO.  5. Has patient been in contact with anyone with TB? NO.  6. Has patient traveled outside the country in the last 21 days? NO.  7. Has patient had a flu shot or vaccine in the last 6 weeks? NO  8. Plan~ A. Therapy is appropriate, will proceed with treatment today?                                        YES.      B. Medication will be held today and patient will be re-assessed at a later                      Date: Not Applicable.    [facility] - REMS requirements:    9. [facility] REMS infusion checklist reviewed and proper information documented: YES.  10. [facility] REMS infusion checklist completed online daily and submitted after FINAL infusion: YES.  11. Post-infusion instructions reviewed and given to patient: YES.  15. Adverse reactions reported to: N/A.      Post Infusion Assessment:  Patient tolerated infusion without incident.  Patient observed for 120 minutes post [facility] per protocol.  Blood return noted pre and post infusion.  Site patent and intact, free from redness, edema or discomfort.  No evidence of extravasations.    Discharge Plan:   Patient declined prescription refills.  Discharge instructions reviewed with:  Patient.  Patient and/or family verbalized understanding of discharge instructions and all questions answered.  Copy of AVS reviewed with patient and/or family.  Patient will return 11/16/18 for next appointment.  Patient discharged in stable condition accompanied by: self.  Departure Mode: Ambulatory.    GUTIERREZ Norris RN

## 2018-11-15 NOTE — MR AVS SNAPSHOT
After Visit Summary   11/15/2018    Linda Grover    MRN: [id number]           Patient Information     Date Of Birth          1955        Visit Information        Provider Department      11/15/2018 8:30 AM  INFUSION CHAIR 10 [facility]        Today's Diagnoses     Multiple sclerosis (H)    -  1       Follow-ups after your visit        Your next 10 appointments already scheduled     Nov 16, 2018  8:30 AM CST   Level 7 with  INFUSION CHAIR 12   [facility] ([facility])    [facility] [place]  [address]  [place] [id number] [phone]              Who to contact     If you have questions or need follow up information about today's clinic visit or your schedule please contact [facility] [place] directly at [phone].  Normal or non-critical lab and imaging results will be communicated to you by MyChart, letter or phone within 4 business days after the clinic has received the results. If you do not hear from us within 7 days, please contact the clinic through MyChart or phone. If you have a critical or abnormal lab result, we will notify you by phone as soon as possible.  Submit refill requests through [employer] or call your pharmacy and they will forward the refill request to us. Please allow 3 business days for your refill to be completed.          Additional Information About Your Visit        Care EveryWhere ID     This is your Care EveryWhere ID. This could be used by other organizations to access your [place] medical records  [id number]        Your Vitals Were     Pulse Temperature Respirations Pulse Oximetry          81 97.8  F (36.6  C) (Oral) 16 95%         Blood Pressure from Last 3 Encounters:   11/15/18 131/77   11/14/18 140/86   02/22/18 134/80    Weight from Last 3 Encounters:   02/22/18 114.8 kg (253 lb)   02/19/18 114.8 kg (253 lb)   02/14/18  115.1 kg (253 lb 12.8 oz)              Today, you had the following     No orders found for display       Primary Care Provider Fax #    Physician No Ref-Primary [phone]       No address on file        Equal Access to Services     ISIDRO MEDELLINLESLEY : Roshan austin bravo humberto Glover, wachadda luqadaha, qapreetta kaalmada curry, ronal richardsonron ospina. So [facility] [phone].    ATENCIÓN: Si habla español, tiene a aparicio disposición servicios gratuitos de asistencia lingüística. Llame al [phone].    We comply with applicable federal civil rights laws and [place] laws. We do not discriminate on the basis of race, color, national origin, age, disability, sex, sexual orientation, or gender identity.            Thank you!     Thank you for choosing [facility]ION [facility]  for your care. Our goal is always to provide you with excellent care. Hearing back from our patients is one way we can continue to improve our services. Please take a few minutes to complete the written survey that you may receive in the mail after your visit with us. Thank you!             Your Updated Medication List - Protect others around you: Learn how to safely use, store and throw away your medicines at www.disposemymeds.org.          This list is accurate as of 11/15/18  3:51 PM.  Always use your most recent med list.                   Brand Name Dispense Instructions for use Diagnosis    alemtuzumab 12 mg      3 day infusion on 11-14-11/16/2018        aspirin 81 MG EC tablet      Take 81 mg by mouth daily        atorvastatin 40 MG tablet    LIPITOR     Take 40 mg by mouth At Bedtime        baclofen 10 MG tablet    LIORESAL     Take 15 mg by mouth 3 times daily        cholecalciferol 5000 units (125 mcg) Caps capsule    vitamin D3     Take 7,000 Units by mouth daily        fish oil-omega-3 fatty acids 1000 MG capsule      Take 1 g by mouth daily        lisinopril 20 MG tablet    PRINIVIL/ZESTRIL     Take 20  mg by mouth At Bedtime        meperidine 50 MG tablet    DEMEROL     Take 50 mg by mouth every 4 hours as needed        MULTIVITAMIN ADULT Tabs      Take 1 tablet by mouth daily        omeprazole 20 MG CR capsule    priLOSEC     Take 20 mg by mouth daily        valACYclovir 500 MG tablet    VALTREX     Take 500 mg by mouth 2 times daily        VISTARIL PO      Take 50 mg by mouth 3 times daily        ZANTAC PO      Take 150 mg by mouth daily

## 2018-11-16 ENCOUNTER — INFUSION THERAPY VISIT (OUTPATIENT)
Dept: INFUSION THERAPY | Facility: CLINIC | Age: 63
End: 2018-11-16
Attending: PSYCHIATRY & NEUROLOGY
Payer: COMMERCIAL

## 2018-11-16 VITALS
SYSTOLIC BLOOD PRESSURE: 135 MMHG | DIASTOLIC BLOOD PRESSURE: 77 MMHG | HEART RATE: 74 BPM | RESPIRATION RATE: 16 BRPM | TEMPERATURE: 98 F

## 2018-11-16 DIAGNOSIS — G35 MULTIPLE SCLEROSIS (H): Primary | ICD-10-CM

## 2018-11-16 PROCEDURE — 25000128 H RX IP 250 OP 636: Performed by: PSYCHIATRY & NEUROLOGY

## 2018-11-16 PROCEDURE — 96365 THER/PROPH/DIAG IV INF INIT: CPT

## 2018-11-16 PROCEDURE — 25000132 ZZH RX MED GY IP 250 OP 250 PS 637: Performed by: PSYCHIATRY & NEUROLOGY

## 2018-11-16 PROCEDURE — 96366 THER/PROPH/DIAG IV INF ADDON: CPT

## 2018-11-16 PROCEDURE — 96367 TX/PROPH/DG ADDL SEQ IV INF: CPT

## 2018-11-16 RX ORDER — ACETAMINOPHEN 500 MG
1000 TABLET ORAL ONCE
Status: CANCELLED | OUTPATIENT
Start: 2018-11-16

## 2018-11-16 RX ORDER — ACETAMINOPHEN 500 MG
1000 TABLET ORAL ONCE
Status: COMPLETED | OUTPATIENT
Start: 2018-11-16 | End: 2018-11-16

## 2018-11-16 RX ORDER — MEPERIDINE HYDROCHLORIDE 50 MG/1
50 TABLET ORAL EVERY 4 HOURS PRN
Status: CANCELLED
Start: 2018-11-16

## 2018-11-16 RX ORDER — MEPERIDINE HYDROCHLORIDE 50 MG/1
50 TABLET ORAL ONCE
Status: COMPLETED | OUTPATIENT
Start: 2018-11-16 | End: 2018-11-16

## 2018-11-16 RX ORDER — HYDROXYZINE PAMOATE 50 MG/1
50 CAPSULE ORAL
Status: CANCELLED
Start: 2018-11-16

## 2018-11-16 RX ORDER — ONDANSETRON 2 MG/ML
8 INJECTION INTRAMUSCULAR; INTRAVENOUS EVERY 8 HOURS PRN
Status: CANCELLED
Start: 2018-11-16

## 2018-11-16 RX ORDER — MEPERIDINE HYDROCHLORIDE 50 MG/1
50 TABLET ORAL ONCE
Status: CANCELLED
Start: 2018-11-16 | End: 2018-11-16

## 2018-11-16 RX ORDER — HYDROXYZINE HYDROCHLORIDE 50 MG/1
50 TABLET, FILM COATED ORAL ONCE
Status: COMPLETED | OUTPATIENT
Start: 2018-11-16 | End: 2018-11-16

## 2018-11-16 RX ORDER — ACETAMINOPHEN 500 MG
1000 TABLET ORAL EVERY 4 HOURS PRN
Status: CANCELLED | OUTPATIENT
Start: 2018-11-16

## 2018-11-16 RX ORDER — PROMETHAZINE HYDROCHLORIDE 25 MG/ML
25 INJECTION, SOLUTION INTRAMUSCULAR; INTRAVENOUS
Status: CANCELLED
Start: 2018-11-16

## 2018-11-16 RX ORDER — HYDROXYZINE PAMOATE 50 MG/1
50 CAPSULE ORAL ONCE
Status: CANCELLED
Start: 2018-11-16 | End: 2018-11-16

## 2018-11-16 RX ORDER — DIPHENHYDRAMINE HCL 25 MG
25 CAPSULE ORAL ONCE
Status: CANCELLED | OUTPATIENT
Start: 2018-11-16

## 2018-11-16 RX ORDER — IBUPROFEN 200 MG
800 TABLET ORAL EVERY 6 HOURS PRN
Status: CANCELLED | OUTPATIENT
Start: 2018-11-16

## 2018-11-16 RX ORDER — DIPHENHYDRAMINE HYDROCHLORIDE 50 MG/ML
25 INJECTION INTRAMUSCULAR; INTRAVENOUS EVERY 6 HOURS PRN
Status: CANCELLED | OUTPATIENT
Start: 2018-11-16

## 2018-11-16 RX ORDER — EPINEPHRINE 0.3 MG/.3ML
0.3 INJECTION SUBCUTANEOUS
Status: CANCELLED | OUTPATIENT
Start: 2018-11-16

## 2018-11-16 RX ADMIN — SODIUM CHLORIDE 1000 MG: 9 INJECTION, SOLUTION INTRAVENOUS at 08:59

## 2018-11-16 RX ADMIN — ACETAMINOPHEN 1000 MG: 500 TABLET ORAL at 09:32

## 2018-11-16 RX ADMIN — MEPERIDINE HYDROCHLORIDE 50 MG: 50 TABLET ORAL at 09:34

## 2018-11-16 RX ADMIN — RENAGEL 12 MG: 400 TABLET ORAL at 10:03

## 2018-11-16 RX ADMIN — HYDROXYZINE HYDROCHLORIDE 50 MG: 50 TABLET, FILM COATED ORAL at 09:33

## 2018-11-16 NOTE — PATIENT INSTRUCTIONS
Thank you for choosing [facility]. The following information is a summary of your appointment as well as important reminders:      Last dose Tylenol was 0930  Last dose Ibuprofen was NA.  Last dose Benadryl was NA  Last dose Hydroxizine was this am with breakfast  Last dose Ranitidine was this am with breakfast  Last dose of Meperidine was 0930.    Care for yourself after your infusions:  1.  Notify your doctor or seek appropriate medical attention as soon as possible if you experience any discomfort or anything that feels out of the ordinary--including swelling in your mouth or throat, trouble breathing, weakness; fast, slow or irregular heartbeat, chest pain or rash.    2.  Please continue all post infusion medications as prescribed by your healthcare provider.  3.  Stay hydrated.  Be sure to drink plenty of water.  4.  Rest.  You may feel tired, so take it easy and don't overexert yourself.  5.  Eat your regular meals and usual portion sizes as tolerated.   6.  Be mindful of infections and ways to help reduce your risk.      We look forward in seeing you on your next appointment here at [facility].  Please don t hesitate to call us at [phone] to reschedule any of your appointments or to speak with one of our registered nurses.  It was a pleasure taking care of you today.    Sincerely,    [facility]  [facility]  [address] [facility] [address] [place] [address]  Phone: [phone]

## 2018-11-16 NOTE — MR AVS SNAPSHOT
After Visit Summary   11/16/2018    Linda Grover    MRN: [id number]           Patient Information     Date Of Birth          1955        Visit Information        Provider Department      11/16/2018 8:30 AM  INFUSION CHAIR 12 [facility]        Today's Diagnoses     Multiple sclerosis (H)    -  1      Care Instructions    Thank you for choosing [facility]. The following information is a summary of your appointment as well as important reminders:      Last dose Tylenol was 0930  Last dose Ibuprofen was NA.  Last dose Benadryl was NA  Last dose Hydroxizine was this am with breakfast  Last dose Ranitidine was this am with breakfast  Last dose of Meperidine was 0930.    Care for yourself after your infusions:  1.  Notify your doctor or seek appropriate medical attention as soon as possible if you experience any discomfort or anything that feels out of the ordinary--including swelling in your mouth or throat, trouble breathing, weakness; fast, slow or irregular heartbeat, chest pain or rash.    2.  Please continue all post infusion medications as prescribed by your healthcare provider.  3.  Stay hydrated.  Be sure to drink plenty of water.  4.  Rest.  You may feel tired, so take it easy and don't overexert yourself.  5.  Eat your regular meals and usual portion sizes as tolerated.   6.  Be mindful of infections and ways to help reduce your risk.      We look forward in seeing you on your next appointment here at [facility] [place].  Please don t hesitate to call us at [phone] to reschedule any of your appointments or to speak with one of our registered nurses.  It was a pleasure taking care of you today.    Sincerely,    [facility]  [facility]  [address] [place] [address]  Phone: [phone]             Follow-ups after your visit        Who to contact     If you have questions or need follow up information about today's clinic visit or your schedule please contact [facility] directly at [phone].  Normal or non-critical lab and imaging results will be communicated to you by MyChart, letter or phone within 4 business days after the clinic has received the results. If you do not hear from us within 7 days, please contact the clinic through MyChart or phone. If you have a critical or abnormal lab result, we will notify you by phone as soon as possible.  Submit refill requests through [employer] or call your pharmacy and they will forward the refill request to us. Please allow 3 business days for your refill to be completed.          Additional Information About Your Visit        Care EveryWhere ID     This is your Care EveryWhere ID. This could be used by other organizations to access your [place] medical records  [id number]        Your Vitals Were     Pulse Temperature Respirations             74 98  F (36.7  C) 16          Blood Pressure from Last 3 Encounters:   11/16/18 135/77   11/15/18 131/77   11/14/18 140/86    Weight from Last 3 Encounters:   02/22/18 114.8 kg (253 lb)   02/19/18 114.8 kg (253 lb)   02/14/18 115.1 kg (253 lb 12.8 oz)              Today, you had the following     No orders found for display       Primary Care Provider Fax #    Physician No Ref-Primary [phone]       No address on file        Equal Access to Services     ISIDRO SEVILLA : Hadii austin ku hadasho Soscarletali, waaxda luqadaha, qaybta kaalmada adeegyada, ronal guevara . So [facility] [phone].    ATENCIÓN: Si habla español, tiene a aparicio disposición servicios gratuitos de asistencia lingüística. Harris al [phone].    We comply with applicable federal civil rights laws and [place] laws. We do not discriminate on the basis of race, color, national origin, age, disability,  sex, sexual orientation, or gender identity.            Thank you!     Thank you for choosing [facility]  for your care. Our goal is always to provide you with excellent care. Hearing back from our patients is one way we can continue to improve our services. Please take a few minutes to complete the written survey that you may receive in the mail after your visit with us. Thank you!             Your Updated Medication List - Protect others around you: Learn how to safely use, store and throw away your medicines at www.disposemymeds.org.          This list is accurate as of 11/16/18  3:50 PM.  Always use your most recent med list.                   Brand Name Dispense Instructions for use Diagnosis    alemtuzumab 12 mg      3 day infusion on 11-14-11/16/2018        aspirin 81 MG EC tablet      Take 81 mg by mouth daily        atorvastatin 40 MG tablet    LIPITOR     Take 40 mg by mouth At Bedtime        baclofen 10 MG tablet    LIORESAL     Take 15 mg by mouth 3 times daily        cholecalciferol 5000 units (125 mcg) Caps capsule    vitamin D3     Take 7,000 Units by mouth daily        fish oil-omega-3 fatty acids 1000 MG capsule      Take 1 g by mouth daily        lisinopril 20 MG tablet    PRINIVIL/ZESTRIL     Take 20 mg by mouth At Bedtime        meperidine 50 MG tablet    DEMEROL     Take 50 mg by mouth every 4 hours as needed        MULTIVITAMIN ADULT Tabs      Take 1 tablet by mouth daily        omeprazole 20 MG CR capsule    priLOSEC     Take 20 mg by mouth daily        valACYclovir 500 MG tablet    VALTREX     Take 500 mg by mouth 2 times daily        VISTARIL PO      Take 50 mg by mouth 3 times daily        ZANTAC PO      Take 150 mg by mouth daily

## 2018-11-16 NOTE — PROGRESS NOTES
Lemtrada Infusion Nursing Note:  Linda Grover presents today for Lemtrada 3/3  Patient seen by provider today: No   present during visit today: Not Applicable.    Note: N/A.    Intravenous Access:  Peripheral IV in place    Pre infusion Assessment:    1. Is patient authorized and have they received the Lemtrada treatment and infusion reactions patient guide? YES.  2. If ordered, has patient taken pre-medications? YES.  3. Does patient have any of the following signs of infection: fever, cough, sore throat, wounds, UTI symptoms? NO.  4. Is patient taking any oral, IV or topical anti-infective medication? YES.  5. Has patient been in contact with anyone with TB? NO.  6. Has patient traveled outside the country in the last 21 days? NO.  7. Has patient had a flu shot or vaccine in the last 6 weeks? NO  8. Plan~ A. Therapy is appropriate, will proceed with treatment today?                                        YES.      B. Medication will be held today and patient will be re-assessed at a later                      Date: Not Applicable.    Lemtrada - REMS requirements:    9. Lemtrada REMS infusion checklist reviewed and proper information documented: YES.  10. Lemtrada REMS infusion checklist completed online daily and submitted after FINAL infusion: YES.  11. Post-infusion instructions reviewed and given to patient: YES.  15. Adverse reactions reported to: N/A.      Post Infusion Assessment:  Patient tolerated infusion without incident.  Patient observed for 120 minutes post Lemtrada per protocol.  Site patent and intact, free from redness, edema or discomfort.  No evidence of extravasations.  Access discontinued per protocol.    Discharge Plan:   Discharge instructions reviewed with: Patient and Family.  Patient and/or family verbalized understanding of discharge instructions and all questions answered.  Copy of AVS reviewed with patient and/or family.  Patient will return --therapy complete--- for next  appointment.  Patient discharged in stable condition accompanied by: wife.  Departure Mode: Ambulatory.    Belkys Tamayo RN

## 2019-07-04 ENCOUNTER — HOSPITAL ENCOUNTER (EMERGENCY)
Facility: CLINIC | Age: 64
Discharge: HOME | End: 2019-07-04
Attending: EMERGENCY MEDICINE | Admitting: EMERGENCY MEDICINE
Payer: COMMERCIAL

## 2019-07-04 VITALS
TEMPERATURE: 97.2 F | HEART RATE: 72 BPM | OXYGEN SATURATION: 97 % | DIASTOLIC BLOOD PRESSURE: 93 MMHG | RESPIRATION RATE: 18 BRPM | SYSTOLIC BLOOD PRESSURE: 163 MMHG

## 2019-07-04 DIAGNOSIS — S05.01XA ABRASION OF RIGHT CORNEA, INITIAL ENCOUNTER: ICD-10-CM

## 2019-07-04 PROCEDURE — 99283 EMERGENCY DEPT VISIT LOW MDM: CPT

## 2019-07-04 RX ORDER — ERYTHROMYCIN 5 MG/G
0.5 OINTMENT OPHTHALMIC AT BEDTIME
Qty: 3.5 G | Refills: 0 | Status: SHIPPED | OUTPATIENT
Start: 2019-07-04 | End: 2019-07-09

## 2019-07-04 RX ORDER — TETRACAINE HYDROCHLORIDE 5 MG/ML
SOLUTION OPHTHALMIC
Status: DISCONTINUED
Start: 2019-07-04 | End: 2019-07-04 | Stop reason: HOSPADM

## 2019-07-04 RX ORDER — SULFACETAMIDE SODIUM 100 MG/ML
1-2 SOLUTION/ DROPS OPHTHALMIC
Qty: 3 ML | Refills: 0 | Status: SHIPPED | OUTPATIENT
Start: 2019-07-04 | End: 2019-07-09

## 2019-07-04 ASSESSMENT — ENCOUNTER SYMPTOMS
EYE PAIN: 1
PHOTOPHOBIA: 1

## 2019-07-04 NOTE — ED AVS SNAPSHOT
[facility] Emergency Department  [address]  [facility] [address]  Phone:  [phone]  Fax:  [phone]                                    Linda Grover   MRN: [id number]    Department:  [facility] Emergency Department   Date of Visit:  7/4/2019           After Visit Summary Signature Page    I have received my discharge instructions, and my questions have been answered. I have discussed any challenges I see with this plan with the nurse or doctor.    ..........................................................................................................................................  Patient/Patient Representative Signature      ..........................................................................................................................................  Patient Representative Print Name and Relationship to Patient    ..................................................               ................................................  Date                                   Time    ..........................................................................................................................................  Reviewed by Signature/Title    ...................................................              ..............................................  Date                                               Time          22EPIC Rev 08/18

## 2019-07-04 NOTE — ED TRIAGE NOTES
"Patient presents with right eye pain after tree branch \"scratched\" eye yesterday. Patient last tetanus was 2015.  "

## 2019-07-04 NOTE — ED PROVIDER NOTES
"  History     Chief Complaint:  Eye Pain      The history is provided by the patient.      Linda Grover is a 63 year old male with a history of multiple sclerosis who presents with eye pain. The patient says that yesterday he scratched his right eye on a tree branch. He says that his eye feels like it has been scratched like a \"constant burning\". The patient also says that his right eye has some sensitivity to light. The patient notes that he does wear glasses and that he has scratched his right eye before.. The patient denies any loss of consciousness, other trauma, or a history of diabetes or other wound healing problems, or any past eye surgeries.      Allergies:  No Known Drug Allergies     Medications:    Alemtuzumab  Aspirin    Lipitor  Lioresal  Fish oil  Vistaril  Lisinopril  Demerol  Prilosec  Zantac  Valtrex   Oretic      Past Medical History:    Abdominal pain  Hypertension  Multiple  sclerosis   Insomnia  Psoriasis  Hyperlipidemia  Dyspepsia  Anxiety   Vasovagal episode   Prostate cancer   Femoral neuropathy of right lower extremity  Mass of pancreas  Degenerative  joint disease  Basal cell carcinoma    Past Surgical History:    Close secondary wound abdomen  Laparoscopic cholecystectomy  Tonsillectomy    MOHS surgery  Skin biopsy  [place] teeth extraction    Family History:    Father: prostate cancer, dementia, macular degeneration  Mother: colon cancer, breast cancer  Maternal grandfather: coronary artery disease      Social History:  The patient was accompanied to the ED by wife.  Smoking Status: Former Smoker  Smokeless Tobacco: Never Used  Alcohol Use: Positive  Marital Status:          Review of Systems   Eyes: Positive for photophobia and pain.   Neurological: Negative for syncope.   All other systems reviewed and are negative.    Physical Exam     Patient Vitals for the past 24 hrs:   BP Temp Temp src Pulse Resp SpO2   07/04/19 0900 (!) 163/93 97.2  F (36.2  C) Temporal 72 18 97 %    "     Physical Exam  Constitutional: Vital signs reviewed as above.   Head: No external signs of trauma noted.  Eyes:    EOMI bilaterally. Pupils are equal, round, and reactive to light.    Right eye exhibits no chemosis, no discharge and no exudate.    Right conjunctiva is not injected. Right conjunctiva has no hemorrhage.   Left eye exhibits no chemosis, no discharge and no exudate.     Left conjunctiva is not injected. Left conjunctiva has no hemorrhage.   No scleral icterus.    No periorbital edema or erythema noted   The lids are normal appearing.  Visual Acuity:   R: 20/40   L: 20/25  Woods Lamp:       The right eye shows no foreign body but I do note an area of fluorescein uptake on the inferior portion of the iris.        The left eye shows no corneal abrasion, no foreign body and no fluorescein uptake.   Right eye pressure: 12mmHg  Neck: No JVD noted  Cardiovascular: Normal rate, regular rhythm and normal heart sounds.  No murmur heard. Equal B/L peripheral pulses.  Pulmonary/Chest: Effort normal and breath sounds normal. No respiratory distress. Patient has no wheezes. Patient has no rales.   Gastrointestinal: Soft. There is no tenderness.   Musculoskeletal/Extremities: No edema noted. ROM appears normal.  Neurological: Patient is alert and oriented to person, place, and time.   Skin: Skin is warm and dry. There is no diaphoresis noted.   Psychiatric: The patient appears calm.    Emergency Department Course       Interventions:  0909 Ful Elsy 1 mg   0909 Tetracaine    Emergency Department Course:    0903 Nursing notes and vitals reviewed.    0905 I performed an exam of the patient as documented above.     0923 Prior to discharge, I personally answered all related questions.    Impression & Plan      Medical Decision Making:  Linda Grover is a 63 year old male who presents to the emergency department today for evaluation of a corneal abrasion.  Please see the HPI and exam for specifics.  Patient remained well  in the ED.  Slit-lamp exam is notable for by uptake over the inferior portion of the right iris.  I will prescribe the patient both eyedrops and erythromycin ointment.  He can use erythromycin at night.  He also states he is going to be going out of the country in Saturday and he could potentially use the erythromycin as an ointment on the plane.  I also encouraged usage of over-the-counter eyedrops.  The patient does have an eye doctor and have encouraged follow-up clinic with him as well.  Anticipatory guidance given prior to discharge.    Diagnosis:    ICD-10-CM    1. Abrasion of right cornea, initial encounter S05.01XA      Disposition:   The patient is discharged to home.     Discharge Medications:     Review of your medicines      START taking      Dose / Directions   erythromycin 5 MG/GM ophthalmic ointment  Commonly known as:  ROMYCIN      Dose:  0.5 inch  Place 0.5 inches into the right eye At Bedtime for 5 days Place 0.5 inch in the affected eye at bedtime. (May use up to 4 times daily)  Quantity:  3.5 g  Refills:  0     sulfacetamide 10 % ophthalmic solution  Commonly known as:  BLEPH-10      Dose:  1-2 drop  Place 1-2 drops into the right eye every 4 hours (while awake) for 5 days  Quantity:  3 mL  Refills:  0           Where to get your medicines      Some of these will need a paper prescription and others can be bought over the counter. Ask your nurse if you have questions.    Bring a paper prescription for each of these medications    erythromycin 5 MG/GM ophthalmic ointment    sulfacetamide 10 % ophthalmic solution         Scribe Disclosure:  I, Fam Duffy, am serving as a scribe at 9:03 AM on 7/4/2019 to document services personally performed by Robert Cobb DO based on my observations and the provider's statements to me.      [facility] EMERGENCY DEPARTMENT       Robert Cobb DO  07/04/19 100

## 2022-03-04 ENCOUNTER — TELEPHONE (OUTPATIENT)
Dept: NEUROLOGY | Facility: CLINIC | Age: 67
End: 2022-03-04
Payer: COMMERCIAL

## 2022-03-04 NOTE — TELEPHONE ENCOUNTER
[facility] Call Center    Phone Message    May a detailed message be left on voicemail: yes     Reason for Call: Appointment Intake  Please call when Dr. Louis is scheduling appointments.  Referring Provider Name: YOON Louis  Diagnosis and/or Symptoms: MS    Action Taken: Message routed to:  [facility]

## 2022-05-12 NOTE — TELEPHONE ENCOUNTER
[facility] Call Center    Phone Message    May a detailed message be left on voicemail: yes     Reason for Call: Other: Pt is calling back to schedule with Dr. Louis, Pt  is requesting a call tomorrow before 9am or after 12pm     Action Taken: Message routed to:  [facility] Neurology    Travel Screening: Not Applicable

## 2022-05-16 NOTE — TELEPHONE ENCOUNTER
Action 5/16/22 MV 1.26pm   Action Taken Imaging request faxed to [facility]    6/1/22 MV 8.06am  Images resolved in PACS         RECORDS RECEIVED FROM: self   REASON FOR VISIT: MS   Date of Appt: 6/29/22   NOTES (FOR ALL VISITS) STATUS DETAILS   OFFICE NOTE from other specialist Care Everywhere Dr Farzana Louis @  Neurology:  9/10/21    Dr Matthew Barber @ [facility]:  4/7/21  3/29/21   MEDICATION LIST Care Everywhere    IMAGING  (FOR ALL VISITS)     EMG Care Everywhere [facility]:  4/1/21   MRI (HEAD, NECK, SPINE) Received [facility]:  MRI Lumbar Spine 4/1/21  MRI Cervical Spine 3/30/21  MRI Thoracic Spine 3/30/21  MRI Brain 3/30/21

## 2022-06-28 ENCOUNTER — TRANSFERRED RECORDS (OUTPATIENT)
Dept: HEALTH INFORMATION MANAGEMENT | Facility: CLINIC | Age: 67
End: 2022-06-28

## 2022-06-28 LAB
ALT SERPL-CCNC: 27 IU/L (ref 0–44)
AST SERPL-CCNC: 26 IU/L (ref 0–40)
CREATININE (EXTERNAL): 0.91 MG/DL (ref 0.76–1.27)
GFR ESTIMATED (EXTERNAL): 93 ML/MIN/1.73
TSH SERPL-ACNC: 1.37 UIU/ML (ref 0.45–4.5)

## 2022-06-29 ENCOUNTER — PRE VISIT (OUTPATIENT)
Dept: NEUROLOGY | Facility: CLINIC | Age: 67
End: 2022-06-29
Payer: COMMERCIAL

## 2022-06-29 ENCOUNTER — OFFICE VISIT (OUTPATIENT)
Dept: NEUROLOGY | Facility: CLINIC | Age: 67
End: 2022-06-29
Attending: PSYCHIATRY & NEUROLOGY
Payer: COMMERCIAL

## 2022-06-29 VITALS
DIASTOLIC BLOOD PRESSURE: 77 MMHG | HEIGHT: 70 IN | BODY MASS INDEX: 35.8 KG/M2 | HEART RATE: 86 BPM | WEIGHT: 250.1 LBS | SYSTOLIC BLOOD PRESSURE: 137 MMHG

## 2022-06-29 DIAGNOSIS — M79.2 NEUROPATHIC PAIN: ICD-10-CM

## 2022-06-29 DIAGNOSIS — G35 MS (MULTIPLE SCLEROSIS) (H): Primary | ICD-10-CM

## 2022-06-29 DIAGNOSIS — R25.2 SPASTICITY: ICD-10-CM

## 2022-06-29 PROCEDURE — G0463 HOSPITAL OUTPT CLINIC VISIT: HCPCS

## 2022-06-29 PROCEDURE — 99204 OFFICE O/P NEW MOD 45 MIN: CPT | Performed by: PSYCHIATRY & NEUROLOGY

## 2022-06-29 RX ORDER — GABAPENTIN 300 MG/1
300 CAPSULE ORAL 3 TIMES DAILY
Qty: 270 CAPSULE | Refills: 3 | Status: SHIPPED | OUTPATIENT
Start: 2022-06-29 | End: 2022-11-04

## 2022-06-29 RX ORDER — BACLOFEN 10 MG/1
20 TABLET ORAL 3 TIMES DAILY
Qty: 540 TABLET | Refills: 3 | Status: SHIPPED | OUTPATIENT
Start: 2022-06-29 | End: 2022-12-13

## 2022-06-29 ASSESSMENT — PAIN SCALES - GENERAL: PAINLEVEL: NO PAIN (0)

## 2022-06-29 NOTE — PATIENT INSTRUCTIONS
We will get the lab orders arranged to come to me     I anticipate that you will not need any more medication for MS     It is important to continue with your current level of activity     Vitamin D supplement is perfect    Taking baclofen 20 mg three times per day should help reduce some of the tightness that you experience with activity   You can also try stretching for 10-15 minutes before activities where you anticipate you will get tight    Continue gabapentin     Follow up in 6 months   Anticipate MRI in 1 year

## 2022-06-29 NOTE — PROGRESS NOTES
Date of Service: 6/29/2022    [facility] Neurology   MS Clinic Follow-up     Subjective: 66-year-old man with a history of hypertension, prostate cancer status post excision in October 2020, psoriasis, and multiple sclerosis who presents for evaluation.    He is accompanied by his wife, Gayathri, but provides history independently.    Since his last visit with me he has undergone a right knee replacement.  He has found ambulating easier since the knee replacement was completed.    He work with physical therapy to address his lower back pain.  This has been helpful.  He did recently have a flare of radiating pain into the right leg, but was able to get an epidural steroid injection which was effective.  When he does have flares of this pain it will interrupt his sleep.    He denies any radiating pain currently.  He does have constant tingling in his hands and legs.  The right leg is tingling more than the left.  However, the symptoms are generally manageable with gabapentin at a dose of 300 mg 3 times per day.  He remains active by biking, hiking and walking.  He is able to walk much farther than he was able to prior to his knee replacement.  He does continue to experience some stiffness of the lower extremities after walking for longer periods of time.  However this will resolve if he is able to sit down and rest.  He also has difficulty standing for more than 10 minutes because the legs will stiffen up.  He does not stretch routinely.    He is taking baclofen routinely.  It is prescribed 15 mg 3 times per day, but he admits that he will often take 20 mg in the morning, forgets the midday dose and take it in the evening.    Disease onset: age 55, paresthesias in UE with hand weakness, was on enbrel for psoriasis  Last relapse: unk    DMD hx:   Copaxone 2010 - 2016, d/c progression of disease though not clear what clinical symptoms were present  tysabri 2016 - 2017, d/c JCV seroconversion   lemtrada 2017 & 11/2018, now  "undergoing post treatment monitoring       No Known Allergies    Current Outpatient Medications   Medication     alemtuzumab 12 mg     atorvastatin (LIPITOR) 40 MG tablet     baclofen (LIORESAL) 10 MG tablet     baclofen (LIORESAL) 10 MG tablet     cholecalciferol (VITAMIN D3) 5000 UNITS CAPS capsule     fish oil-omega-3 fatty acids 1000 MG capsule     gabapentin (NEURONTIN) 300 MG capsule     HydrOXYzine Pamoate (VISTARIL PO)     lisinopril (PRINIVIL/ZESTRIL) 20 MG tablet     meperidine (DEMEROL) 50 MG tablet     Multiple Vitamins-Minerals (MULTIVITAMIN ADULT) TABS     omeprazole (PRILOSEC) 20 MG CR capsule     valACYclovir (VALTREX) 500 MG tablet     No current facility-administered medications for this visit.        Past medical, surgical, social and family history was personally reviewed. Pertinent details noted above.     Physical Examination:   /77 (BP Location: Left arm, Patient Position: Chair, Cuff Size: Adult Regular)   Pulse 86   Ht 1.778 m (5' 10\")   Wt 113.4 kg (250 lb 1.6 oz)   BMI 35.89 kg/m      General: no acute distress  Cranial nerves:   VFFC  PERRL w/no RAPD  EOM full w/no CHARLIE   Face symmetric  Hearing intact  No dysarthria   Motor:   Tone is mildly increased in the LE  Bulk is normal     R L  Deltoid  5 5  Biceps  5 5  Triceps 5 5  Wrist ext 5 5  Finger ext 5 5  Finger abd 5 5    Hip flexion 5- 5  Knee flexion 5 5  Knee ext 5 5  Ankle d/f 5 5    Reflexes: absent in the right patella, normal on the left, babinski present on the left  Sensory: vibration is mild/mod reduced in the toes, mildly reduced in the ankles, JPS normal in the toes   Romberg is absent  Coordination: no ataxia or dysmetria  Gait: normal base and stride, tandem gait is intact, able to balance on one foot x 10 seconds    Tests/Imaging:   CSF >5 ocb, IgG ind 1.07    MAGDALENA virus Ab reported positive  Vitamin D 70    MRI brain   2015 - few periventricular lesions, deep white matter ovoid lesion in the left periatrial " region, multiple non specific punctate white matter lesions, gd-   4/2021 - no new lesions    MRI cervical spine   2015 - dorsal cord lesion at c1, gd-   4/2021 - no new lesions    MRI thoracic spine   2015 - negative for ms lesions, gd-   4/2021 - no new lesions      Assessment: 66 year old man with multiple sclerosis who is s/p lemtrada and appears to be clinically and radiologically stable.      We will work on getting lab results sent to me for post lemtrada monitoring due to risk for hematologic and renal toxicity. Monitoring needs to be done on a monthly basis through 11/2023.     We reviewed management of spasticity. Stretching advised. Increase baclofen to 20 mg tid.     He will continue the pnbc program for his lower back.     I will update him on whether acyclovir is still needed pending results of his blood work.     Questions about medical marijuana were addressed today.     Plan:     - baclofen 20 mg tid   - gabapentin 300 mg tid   - no ms dmt for now   - mri in 1 year   - follow up in 6 months    Note was completed with the assistance of Dragon Fluency software which can often result in accidental word substitutions.     A total of 40 minutes on the date of service were spent in the care of this patient.   Farzana Louis MD on 6/29/2022 at 8:27 AM

## 2022-06-29 NOTE — LETTER
Date:June 29, 2022      Provider requested that no letter be sent. Do not send.       [facility]

## 2022-06-29 NOTE — LETTER
6/29/2022       RE: Linda Grover  [address]  [facility] [address]     Dear Colleague,    Thank you for referring your patient, Linda Grover, to the [facility]. Please see a copy of my visit note below.    Date of Service: 6/29/2022    [facility] Neurology   MS Clinic Follow-up     Subjective: 66-year-old man with a history of hypertension, prostate cancer status post excision in October 2020, psoriasis, and multiple sclerosis who presents for evaluation.    He is accompanied by his wife, Gayathri, but provides history independently.    Since his last visit with me he has undergone a right knee replacement.  He has found ambulating easier since the knee replacement was completed.    He work with physical therapy to address his lower back pain.  This has been helpful.  He did recently have a flare of radiating pain into the right leg, but was able to get an epidural steroid injection which was effective.  When he does have flares of this pain it will interrupt his sleep.    He denies any radiating pain currently.  He does have constant tingling in his hands and legs.  The right leg is tingling more than the left.  However, the symptoms are generally manageable with gabapentin at a dose of 300 mg 3 times per day.  He remains active by biking, hiking and walking.  He is able to walk much farther than he was able to prior to his knee replacement.  He does continue to experience some stiffness of the lower extremities after walking for longer periods of time.  However this will resolve if he is able to sit down and rest.  He also has difficulty standing for more than 10 minutes because the legs will stiffen up.  He does not stretch routinely.    He is taking baclofen routinely.  It is prescribed 15 mg 3 times per day, but he admits that he will often take 20 mg in the morning, forgets the midday dose  "and take it in the evening.    Disease onset: age 55, paresthesias in UE with hand weakness, was on enbrel for psoriasis  Last relapse: unk    DMD hx:   Copaxone 2010 - 2016, d/c progression of disease though not clear what clinical symptoms were present  tysabri 2016 - 2017, d/c JCV seroconversion   lemtrada 2017 & 11/2018, now undergoing post treatment monitoring       No Known Allergies    Current Outpatient Medications   Medication     alemtuzumab 12 mg     atorvastatin (LIPITOR) 40 MG tablet     baclofen (LIORESAL) 10 MG tablet     baclofen (LIORESAL) 10 MG tablet     cholecalciferol (VITAMIN D3) 5000 UNITS CAPS capsule     fish oil-omega-3 fatty acids 1000 MG capsule     gabapentin (NEURONTIN) 300 MG capsule     HydrOXYzine Pamoate (VISTARIL PO)     lisinopril (PRINIVIL/ZESTRIL) 20 MG tablet     meperidine (DEMEROL) 50 MG tablet     Multiple Vitamins-Minerals (MULTIVITAMIN ADULT) TABS     omeprazole (PRILOSEC) 20 MG CR capsule     valACYclovir (VALTREX) 500 MG tablet     No current facility-administered medications for this visit.        Past medical, surgical, social and family history was personally reviewed. Pertinent details noted above.     Physical Examination:   /77 (BP Location: Left arm, Patient Position: Chair, Cuff Size: Adult Regular)   Pulse 86   Ht 1.778 m (5' 10\")   Wt 113.4 kg (250 lb 1.6 oz)   BMI 35.89 kg/m      General: no acute distress  Cranial nerves:   VFFC  PERRL w/no RAPD  EOM full w/no CHARLIE   Face symmetric  Hearing intact  No dysarthria   Motor:   Tone is mildly increased in the LE  Bulk is normal     R L  Deltoid  5 5  Biceps  5 5  Triceps 5 5  Wrist ext 5 5  Finger ext 5 5  Finger abd 5 5    Hip flexion 5- 5  Knee flexion 5 5  Knee ext 5 5  Ankle d/f 5 5    Reflexes: absent in the right patella, normal on the left, babinski present on the left  Sensory: vibration is mild/mod reduced in the toes, mildly reduced in the ankles, JPS normal in the toes   Romberg is " absent  Coordination: no ataxia or dysmetria  Gait: normal base and stride, tandem gait is intact, able to balance on one foot x 10 seconds    Tests/Imaging:   CSF >5 ocb, IgG ind 1.07    MAGDALENA virus Ab reported positive  Vitamin D 70    MRI brain   2015 - few periventricular lesions, deep white matter ovoid lesion in the left periatrial region, multiple non specific punctate white matter lesions, gd-   4/2021 - no new lesions    MRI cervical spine   2015 - dorsal cord lesion at c1, gd-   4/2021 - no new lesions    MRI thoracic spine   2015 - negative for ms lesions, gd-   4/2021 - no new lesions      Assessment: 66 year old man with multiple sclerosis who is s/p lemtrada and appears to be clinically and radiologically stable.      We will work on getting lab results sent to me for post lemtrada monitoring due to risk for hematologic and renal toxicity. Monitoring needs to be done on a monthly basis through 11/2023.     We reviewed management of spasticity. Stretching advised. Increase baclofen to 20 mg tid.     He will continue the pnbc program for his lower back.     I will update him on whether acyclovir is still needed pending results of his blood work.     Questions about medical marijuana were addressed today.     Plan:     - baclofen 20 mg tid   - gabapentin 300 mg tid   - no ms dmt for now   - mri in 1 year   - follow up in 6 months    Note was completed with the assistance of Dragon Fluency software which can often result in accidental word substitutions.     A total of 40 minutes on the date of service were spent in the care of this patient.   Farzana Louis MD on 6/29/2022 at 8:27 AM          Again, thank you for allowing me to participate in the care of your patient.      Sincerely,    Farzana Louis MD

## 2022-07-02 ENCOUNTER — HEALTH MAINTENANCE LETTER (OUTPATIENT)
Age: 67
End: 2022-07-02

## 2022-07-12 ENCOUNTER — TRANSFERRED RECORDS (OUTPATIENT)
Dept: NEUROLOGY | Facility: CLINIC | Age: 67
End: 2022-07-12

## 2022-07-26 ENCOUNTER — TRANSFERRED RECORDS (OUTPATIENT)
Dept: HEALTH INFORMATION MANAGEMENT | Facility: CLINIC | Age: 67
End: 2022-07-26

## 2022-07-26 LAB
ALT SERPL-CCNC: 22 IU/L (ref 0–44)
AST SERPL-CCNC: 23 IU/L (ref 0–40)
CREATININE (EXTERNAL): 0.93 MG/DL (ref 0.76–1.27)
GFR ESTIMATED (EXTERNAL): 91 ML/MIN/1.73

## 2022-08-05 ENCOUNTER — MYC MEDICAL ADVICE (OUTPATIENT)
Dept: NEUROLOGY | Facility: CLINIC | Age: 67
End: 2022-08-05

## 2022-08-05 DIAGNOSIS — M54.16 LUMBAR RADICULOPATHY: Primary | ICD-10-CM

## 2022-08-05 DIAGNOSIS — F51.01 PRIMARY INSOMNIA: Primary | ICD-10-CM

## 2022-08-05 RX ORDER — TRAZODONE HYDROCHLORIDE 50 MG/1
TABLET, FILM COATED ORAL
COMMUNITY
Start: 2020-08-05 | End: 2022-08-05

## 2022-08-05 RX ORDER — TRAZODONE HYDROCHLORIDE 50 MG/1
50-100 TABLET, FILM COATED ORAL AT BEDTIME
Qty: 180 TABLET | Refills: 3 | Status: SHIPPED | OUTPATIENT
Start: 2022-08-05 | End: 2023-08-17

## 2022-08-05 NOTE — TELEPHONE ENCOUNTER
Requesting refill of their Trazodone; Patient was last seen on 6/29/2022 and has follow up appointment on 12/13/2022 with Dr Louis. Pended rx to Dr Louis for signature and will send electronically to the pharmacy once signed.        Kayla Wood MA

## 2022-08-11 ENCOUNTER — TELEPHONE (OUTPATIENT)
Dept: ANESTHESIOLOGY | Facility: CLINIC | Age: 67
End: 2022-08-11

## 2022-08-11 DIAGNOSIS — M54.16 LUMBAR RADICULOPATHY: Primary | ICD-10-CM

## 2022-08-11 RX ORDER — DIAZEPAM 5 MG
5 TABLET ORAL ONCE
Status: CANCELLED | OUTPATIENT
Start: 2022-08-11 | End: 2022-08-11

## 2022-08-11 NOTE — TELEPHONE ENCOUNTER
Pt called looking to get procedure scheduled and advise I will reach out to pain team and see if wed can have a order placed. Told pt once I have this I will reach out

## 2022-08-12 ENCOUNTER — TELEPHONE (OUTPATIENT)
Dept: ANESTHESIOLOGY | Facility: CLINIC | Age: 67
End: 2022-08-12

## 2022-08-15 ENCOUNTER — MYC MEDICAL ADVICE (OUTPATIENT)
Dept: ANESTHESIOLOGY | Facility: CLINIC | Age: 67
End: 2022-08-15

## 2022-08-15 NOTE — TELEPHONE ENCOUNTER
RN called patient to discuss pre-procedure instructions. Patient denies use of any blood thinners. Instructions sent via [employer] for review..     Ariella Whitt RN

## 2022-08-23 ENCOUNTER — TRANSFERRED RECORDS (OUTPATIENT)
Dept: HEALTH INFORMATION MANAGEMENT | Facility: CLINIC | Age: 67
End: 2022-08-23

## 2022-08-23 LAB
ALT SERPL-CCNC: 17 IU/L (ref 0–44)
AST SERPL-CCNC: 22 IU/L (ref 0–40)
CREATININE (EXTERNAL): 0.89 MG/DL (ref 0.76–1.27)
GFR ESTIMATED (EXTERNAL): 94 ML/MIN/1.73M2
TSH SERPL-ACNC: 2.71 UIU/ML (ref 0.45–4.5)

## 2022-08-26 ENCOUNTER — TELEPHONE (OUTPATIENT)
Dept: ANESTHESIOLOGY | Facility: CLINIC | Age: 67
End: 2022-08-26

## 2022-08-26 ENCOUNTER — MEDICAL CORRESPONDENCE (OUTPATIENT)
Dept: HEALTH INFORMATION MANAGEMENT | Facility: CLINIC | Age: 67
End: 2022-08-26

## 2022-09-01 ENCOUNTER — HOSPITAL ENCOUNTER (OUTPATIENT)
Facility: AMBULATORY SURGERY CENTER | Age: 67
Discharge: HOME | End: 2022-09-01
Attending: ANESTHESIOLOGY | Admitting: ANESTHESIOLOGY
Payer: COMMERCIAL

## 2022-09-01 ENCOUNTER — ANCILLARY PROCEDURE (OUTPATIENT)
Dept: RADIOLOGY | Facility: AMBULATORY SURGERY CENTER | Age: 67
End: 2022-09-01
Attending: ANESTHESIOLOGY
Payer: COMMERCIAL

## 2022-09-01 VITALS
WEIGHT: 250 LBS | HEART RATE: 65 BPM | BODY MASS INDEX: 35.79 KG/M2 | OXYGEN SATURATION: 97 % | SYSTOLIC BLOOD PRESSURE: 137 MMHG | HEIGHT: 70 IN | RESPIRATION RATE: 16 BRPM | TEMPERATURE: 97.3 F | DIASTOLIC BLOOD PRESSURE: 84 MMHG

## 2022-09-01 DIAGNOSIS — M54.16 LUMBAR RADICULOPATHY: ICD-10-CM

## 2022-09-01 DIAGNOSIS — R52 PAIN: ICD-10-CM

## 2022-09-01 PROCEDURE — 62323 NJX INTERLAMINAR LMBR/SAC: CPT

## 2022-09-01 PROCEDURE — 62323 NJX INTERLAMINAR LMBR/SAC: CPT | Mod: GC | Performed by: ANESTHESIOLOGY

## 2022-09-01 RX ORDER — IOPAMIDOL 408 MG/ML
INJECTION, SOLUTION INTRAVASCULAR PRN
Status: DISCONTINUED | OUTPATIENT
Start: 2022-09-01 | End: 2022-09-01 | Stop reason: HOSPADM

## 2022-09-01 RX ORDER — BUPIVACAINE HYDROCHLORIDE 2.5 MG/ML
INJECTION, SOLUTION EPIDURAL; INFILTRATION; INTRACAUDAL PRN
Status: DISCONTINUED | OUTPATIENT
Start: 2022-09-01 | End: 2022-09-01 | Stop reason: HOSPADM

## 2022-09-01 RX ORDER — LIDOCAINE HYDROCHLORIDE 10 MG/ML
INJECTION, SOLUTION EPIDURAL; INFILTRATION; INTRACAUDAL; PERINEURAL PRN
Status: DISCONTINUED | OUTPATIENT
Start: 2022-09-01 | End: 2022-09-01 | Stop reason: HOSPADM

## 2022-09-01 RX ORDER — DEXAMETHASONE SODIUM PHOSPHATE 10 MG/ML
INJECTION INTRAMUSCULAR; INTRAVENOUS PRN
Status: DISCONTINUED | OUTPATIENT
Start: 2022-09-01 | End: 2022-09-01 | Stop reason: HOSPADM

## 2022-09-01 RX ORDER — DIAZEPAM 5 MG
5 TABLET ORAL ONCE
Status: COMPLETED | OUTPATIENT
Start: 2022-09-01 | End: 2022-09-01

## 2022-09-01 RX ADMIN — DIAZEPAM 5 MG: 5 TABLET ORAL at 10:37

## 2022-09-01 NOTE — OP NOTE
Patient: Linda Grover Age: 67 year old   MRN: [id number] Attending: Dr. Camejo     Date of Visit: September 1, 2022      PAIN MEDICINE CLINIC PROCEDURE NOTE    ATTENDING CLINICIAN:    Lisseth Camejo MD    ASSISTANT CLINICIAN:Juarez Hough DO      PREPROCEDURE DIAGNOSES:  1.  Lumbar radiculopathy  2.  Chronic low back pain       PROCEDURE(S) PERFORMED:  1.  Interlaminar lumbar epidural steroid injection (L3/4)   2.  Fluoroscopic guidance for the above-named procedure(s)      ANESTHESIA:  Local.    INDICATIONS:  Linda Grover is a 67 year old male with a history of  chronic low back pain secondary to bilateral lumbosacral radiculopathy (R>L) .  The patient stated that the patient was in their usual state of health and denied recent anticoagulant use or recent infections.  Therefore, the plan is to perform above mentioned procedures.     Procedure Details:  The patient was met in the procedure room, where the patient was identified by name, medical record number and date of birth.  All of the patient s last minute questions were answered. Written informed consent was obtained and saved in the electronic medical record, after the risks, benefits, and alternatives were discussed with the patient.      A formal time-out procedure was performed, as per protocol, including patient name, title of procedure, and site of procedure, and all in the room concurred.  Routine monitors were applied.      The patient was placed in the prone position on the procedure room table.  All pressure points were checked and comfortably padded.  Routine monitors were placed.  Vital signs were stable.    A chlorhexidine prep was completed followed by sterile draping per standard procedure.     The AP fluoroscopic view was optimized for approach at  L3/4 interspace.  The skin over the interspace was infiltrated with 4-5 mL of 1% Lidocaine using a 25 gauge, 1.5 inch needle.  A 20-gauge 3-1/2 inch Tuohy needle was advanced under  fluoroscopic guidance with right paramedial approach until it touched lamina. Mutiple AP and lateral fluoroscopic images at this time  are taken as Tuohy needle was advanced to the epidural space. The epidural space was identified, without evidence of blood, cerebrospinal fluid, or parasthesia throughout. Needle tip placement within the epidural space was further confirmed with 1-2 mL of nonionic contrast agent, with the epidural space visualized in the AP and lateral fluoroscopic view(s) with appropriate spread of the agent with fat vacuolization and no intravascular or intrathecal spread noted. Next, 8 mL of a treatment solution containing 2 mL of preservative free 0.25% bupivacaine, 1 mL of Depo-Medrol 40 mg/mL and 5 mL preservative free normal saline was administered slowly. The needle was withdrawn.      Light pressure was held at the puncture site(s) to prevent ecchymosis and oozing.  The patient's skin was cleansed, and hemostasis was confirmed.  Band-aids were applied to the needle injection site(s).      Condition:    The patient remained awake and alert throughout the procedure.  The patient tolerated the procedure well and was monitored for approximately 15 minutes afterward in the post procedure area.  There were no immediate post procedure complications noted.  The patient was then discharged to home as per protocol.      Pre-procedure pain score: 3/10  Post-procedure pain score: 0/10                              Speaking Coherently

## 2022-09-28 ENCOUNTER — TRANSFERRED RECORDS (OUTPATIENT)
Dept: HEALTH INFORMATION MANAGEMENT | Facility: CLINIC | Age: 67
End: 2022-09-28

## 2022-09-28 LAB
ALT SERPL-CCNC: 19 IU/L (ref 0–44)
AST SERPL-CCNC: 20 IU/L (ref 0–40)
CREATININE (EXTERNAL): 0.88 MG/DL (ref 0.76–1.27)
GFR ESTIMATED (EXTERNAL): 94 ML/MIN/1.73
TSH SERPL-ACNC: 1.59 UIU/ML (ref 0.45–4.5)

## 2022-10-11 ENCOUNTER — TELEPHONE (OUTPATIENT)
Dept: ANESTHESIOLOGY | Facility: CLINIC | Age: 67
End: 2022-10-11

## 2022-10-11 DIAGNOSIS — M54.16 LUMBAR RADICULOPATHY, CHRONIC: Primary | ICD-10-CM

## 2022-10-11 RX ORDER — DIAZEPAM 5 MG
5-10 TABLET ORAL
Status: CANCELLED | OUTPATIENT
Start: 2022-10-11

## 2022-10-11 NOTE — TELEPHONE ENCOUNTER
RN left voicemail for patient to call back to go over pre procedure instructions. Writer sent instructions via [employer] for patient's review.    Odalis Aguilar RN

## 2022-10-12 ENCOUNTER — TELEPHONE (OUTPATIENT)
Dept: ANESTHESIOLOGY | Facility: CLINIC | Age: 67
End: 2022-10-12

## 2022-10-12 NOTE — TELEPHONE ENCOUNTER
Patient is scheduled for procedure with Dr. Camejo    Spoke with: patient    Date of Procedure: 10-20-22    [place] [facility] [place]formed patient they will need an adult  yes    Pre-procedure COVID-19 Test: @ home    Additional comments: Arrival @ 1 pm    Patient is aware pre-op RN will call 2-3 days prior to procedure with arrival time and instructions. yes      Rebecca Young on 10/12/2022 at 10:32 AM

## 2022-10-13 ENCOUNTER — ANCILLARY PROCEDURE (OUTPATIENT)
Dept: MRI IMAGING | Facility: CLINIC | Age: 67
End: 2022-10-13
Attending: PSYCHIATRY & NEUROLOGY
Payer: COMMERCIAL

## 2022-10-13 DIAGNOSIS — M54.16 LUMBAR RADICULOPATHY: ICD-10-CM

## 2022-10-13 PROCEDURE — 72148 MRI LUMBAR SPINE W/O DYE: CPT | Mod: GC | Performed by: STUDENT IN AN ORGANIZED HEALTH CARE EDUCATION/TRAINING PROGRAM

## 2022-10-19 ENCOUNTER — TRANSFERRED RECORDS (OUTPATIENT)
Dept: HEALTH INFORMATION MANAGEMENT | Facility: CLINIC | Age: 67
End: 2022-10-19

## 2022-10-19 LAB
ALT SERPL-CCNC: 24 IU/L (ref 0–44)
AST SERPL-CCNC: 27 IU/L (ref 0–40)
CREATININE (EXTERNAL): 0.83 MG/DL (ref 0.76–1.27)
GFR ESTIMATED (EXTERNAL): 96 ML/MIN/1.73
TSH SERPL-ACNC: 1.58 UIU/ML (ref 0.45–4.5)

## 2022-10-20 ENCOUNTER — HOSPITAL ENCOUNTER (OUTPATIENT)
Facility: AMBULATORY SURGERY CENTER | Age: 67
Discharge: HOME | End: 2022-10-20
Attending: ANESTHESIOLOGY | Admitting: ANESTHESIOLOGY
Payer: COMMERCIAL

## 2022-10-20 ENCOUNTER — ANCILLARY PROCEDURE (OUTPATIENT)
Dept: RADIOLOGY | Facility: AMBULATORY SURGERY CENTER | Age: 67
End: 2022-10-20
Attending: ANESTHESIOLOGY
Payer: COMMERCIAL

## 2022-10-20 VITALS
BODY MASS INDEX: 34.36 KG/M2 | OXYGEN SATURATION: 98 % | TEMPERATURE: 97.3 F | SYSTOLIC BLOOD PRESSURE: 153 MMHG | HEIGHT: 70 IN | HEART RATE: 65 BPM | RESPIRATION RATE: 16 BRPM | WEIGHT: 240 LBS | DIASTOLIC BLOOD PRESSURE: 78 MMHG

## 2022-10-20 DIAGNOSIS — M54.16 LUMBAR RADICULOPATHY, CHRONIC: ICD-10-CM

## 2022-10-20 DIAGNOSIS — M54.16 LUMBAR RADICULOPATHY: ICD-10-CM

## 2022-10-20 PROCEDURE — 62323 NJX INTERLAMINAR LMBR/SAC: CPT | Mod: GC | Performed by: ANESTHESIOLOGY

## 2022-10-20 PROCEDURE — 62323 NJX INTERLAMINAR LMBR/SAC: CPT

## 2022-10-20 RX ORDER — IOPAMIDOL 408 MG/ML
INJECTION, SOLUTION INTRATHECAL DAILY PRN
Status: DISCONTINUED | OUTPATIENT
Start: 2022-10-20 | End: 2022-10-20 | Stop reason: HOSPADM

## 2022-10-20 RX ORDER — LIDOCAINE HYDROCHLORIDE 10 MG/ML
INJECTION, SOLUTION EPIDURAL; INFILTRATION; INTRACAUDAL; PERINEURAL DAILY PRN
Status: DISCONTINUED | OUTPATIENT
Start: 2022-10-20 | End: 2022-10-20 | Stop reason: HOSPADM

## 2022-10-20 RX ORDER — METHYLPREDNISOLONE ACETATE 40 MG/ML
INJECTION, SUSPENSION INTRA-ARTICULAR; INTRALESIONAL; INTRAMUSCULAR; SOFT TISSUE DAILY PRN
Status: DISCONTINUED | OUTPATIENT
Start: 2022-10-20 | End: 2022-10-20 | Stop reason: HOSPADM

## 2022-10-20 RX ORDER — DIAZEPAM 5 MG
5-10 TABLET ORAL
Status: DISCONTINUED | OUTPATIENT
Start: 2022-10-20 | End: 2022-10-21 | Stop reason: HOSPADM

## 2022-10-20 RX ORDER — BUPIVACAINE HYDROCHLORIDE 2.5 MG/ML
INJECTION, SOLUTION EPIDURAL; INFILTRATION; INTRACAUDAL DAILY PRN
Status: DISCONTINUED | OUTPATIENT
Start: 2022-10-20 | End: 2022-10-20 | Stop reason: HOSPADM

## 2022-10-20 NOTE — DISCHARGE INSTRUCTIONS
Home Care Instructions after an Epidural Steroid Pain Injection    A lumbar epidural steroid injection delivers steroid medication directly into the area that may be causing your lower back pain and/or leg pain. A cervical or thoracic epidural steroid injection delivers steroids into the epidural space surrounding spinal nerve roots to help relieve pain in the upper spine/neck.    Activity  -Rest today  -Do not work today  -Resume normal activity tomorrow  -DO NOT shower for 24 hours  -DO NOT remove bandaid for 24 hours    Pain  -You may experience soreness at the injection site for one or two days  -You may use an ice pack for 20 minutes every 2 hours for the first 24 hours  -You may use a heating pad after the first 24 hours  -You may use Tylenol (acetaminophen) every 4 hours or other pain medicines as     directed by your physician    You may experience numbness radiating into your legs or arms (depending on the procedure location). This numbness may last several hours. Until sensation returns to normal; please use caution in walking, climbing stairs, and stepping out of your vehicle, etc.      Common side effects of steroids:  Not everyone will experience corticosteroid side effects. If side effects are experienced, they will gradually subside in the 7-10 day period following an injection. Most common side effects include:  -Flushed face and/or chest  -Feeling of warmth, particularly in the face but could be an overall feeling of warmth  -Increased blood sugar in diabetic patients  -Menstrual irregularities my occur. If taking hormone-based birth control an alternate method of birth control is recommended  -Sleep disturbances and/or mood swings are possible  -Leg cramps    Please contact us if you have:  -Severe pain  -Fever more than 101.5 degrees Fahrenheit  -Signs of infection at the injection site (redness, swelling, or drainage)    FOR PAIN CENTER PATIENTS:  If you have questions, please contact the Pain  Clinic at [phone] Option #1 between the hours of 7:00 am and 3:00 pm Monday through Friday. After office hours you can contact the on call provider by dialing [phone]. If you need immediate attention, we recommend that you go to a hospital emergency room or dial 129.      FOR PM&R PATIENTS:  For patients seen by the PM and R service, please call [phone]. If you need to fax a pain diary to PM&R the fax number is [phone]. If you are unable to fax, uploading to [employer] is encouraged, then send to provider. If you have procedure scheduling questions please call [phone] Option #2

## 2022-10-20 NOTE — OP NOTE
Patient: Linda Grover Age: 67 year old   MRN: [id number] Attending: Dr. Camejo     Date of Visit: October 20, 2022      PAIN MEDICINE CLINIC PROCEDURE NOTE    ATTENDING CLINICIAN:    Lisseth Camejo MD    ASSISTANT CLINICIAN:  Bc Dalal MD    PREPROCEDURE DIAGNOSES:  1.  Lumbar radiculopathy  2.  Chronic low back pain       PROCEDURE(S) PERFORMED:  1.  Interlaminar right L3-L4 lumbar epidural steroid injection   2.  Fluoroscopic guidance for the above-named procedure(s)      ANESTHESIA:  Local.    INDICATIONS:  Linda Grover is a 67 year old male with a history of  chronic low back pain secondary to bilateral lumbosacral radiculopathy (right greater than the left).  Patient's most recent MRI reviewed.  Patient has both L3 and S1 radiculopathy.  His symptoms on the right side restricted above-knee.  On the left side he said that sometimes his entire left lower extremity gives out.  EMG showed L3 radiculopathy.  Therefore decision is made for right L3-L4 epidural steroid injection.  I told him that S1 radiculopathy pain may not be covered with the injection.  And he may need to call us if he wants to perform epidural injection for S1 radiculopathy.  The patient stated that the patient was in their usual state of health and denied recent anticoagulant use or recent infections.  Therefore, the plan is to perform above mentioned procedures.     Procedure Details:  The patient was met in the procedure room, where the patient was identified by name, medical record number and date of birth.  All of the patient s last minute questions were answered. Written informed consent was obtained and saved in the electronic medical record, after the risks, benefits, and alternatives were discussed with the patient.      A formal time-out procedure was performed, as per protocol, including patient name, title of procedure, and site of procedure, and all in the room concurred.  Routine monitors were applied.      The  patient was placed in the prone position on the procedure room table.  All pressure points were checked and comfortably padded.  Routine monitors were placed.  Vital signs were stable.    A chlorhexidine prep was completed followed by sterile draping per standard procedure.     The AP fluoroscopic view was optimized for approach at right L3-L4 interspace.  The skin over the interspace was infiltrated with 4-5 mL of 1% Lidocaine using a 25 gauge, 1.5 inch needle.  A 20-gauge 3-1/2 inch Tuohy needle was advanced under fluoroscopic guidance with right paramedial approach until it touched lamina. Mutiple AP and lateral fluoroscopic images at this time  are taken as Tuohy needle was advanced to the epidural space. The epidural space was identified, without evidence of blood, cerebrospinal fluid, or parasthesia throughout. Needle tip placement within the epidural space was further confirmed with 1-2 mL of nonionic contrast agent, with the epidural space visualized in the AP and lateral fluoroscopic view(s) with appropriate spread of the agent with fat vacuolization and no intravascular or intrathecal spread noted. Next, 8 mL of a treatment solution containing 2 mL of preservative free 0.25% bupivacaine, 1 mL of Depo-Medrol 40 mg/mL and 5 mL preservative free normal saline was administered slowly. The needle was withdrawn.      Light pressure was held at the puncture site(s) to prevent ecchymosis and oozing.  The patient's skin was cleansed, and hemostasis was confirmed.  Band-aids were applied to the needle injection site(s).      Condition:    The patient remained awake and alert throughout the procedure.  The patient tolerated the procedure well and was monitored for approximately 15 minutes afterward in the post procedure area.  There were no immediate post procedure complications noted.  The patient was then discharged to home as per protocol.      Pre-procedure pain score: 4/10  Post-procedure pain score:  0/10

## 2022-11-02 ENCOUNTER — OFFICE VISIT (OUTPATIENT)
Dept: NEUROSURGERY | Facility: CLINIC | Age: 67
End: 2022-11-02
Attending: PSYCHIATRY & NEUROLOGY
Payer: COMMERCIAL

## 2022-11-02 VITALS — DIASTOLIC BLOOD PRESSURE: 80 MMHG | SYSTOLIC BLOOD PRESSURE: 130 MMHG | OXYGEN SATURATION: 96 % | HEART RATE: 67 BPM

## 2022-11-02 DIAGNOSIS — M54.16 LUMBAR RADICULOPATHY: ICD-10-CM

## 2022-11-02 PROCEDURE — G0463 HOSPITAL OUTPT CLINIC VISIT: HCPCS

## 2022-11-02 PROCEDURE — 99205 OFFICE O/P NEW HI 60 MIN: CPT | Performed by: STUDENT IN AN ORGANIZED HEALTH CARE EDUCATION/TRAINING PROGRAM

## 2022-11-02 ASSESSMENT — PAIN SCALES - GENERAL: PAINLEVEL: MILD PAIN (3)

## 2022-11-02 NOTE — LETTER
"    11/2/2022         RE: Linda Grover  [id number] [facility] [address]        Dear Colleague,    Thank you for referring your patient, Linda Grover, to the [facility] [place]. Please see a copy of my visit note below.    HPI:   67-year-old male with a history of multiple sclerosis and baseline tingling and numbness in both hands as well as spasticity in his legs.  He does have pain in his legs with increased activity.  He now also complains of right leg pain which encompasses the entire leg and goes down the back of the lower leg.  He has had recent injections which have helped.  Prior left leg pain was also improved after the most recent injection.  He denies any new weakness.  Denies any bowel or bladder control problems.  He does have a reported EMG which showed a possible L3 radiculopathy on the right.      Current Outpatient Medications   Medication     alemtuzumab 12 mg     atorvastatin (LIPITOR) 40 MG tablet     baclofen (LIORESAL) 10 MG tablet     baclofen (LIORESAL) 10 MG tablet     cholecalciferol (VITAMIN D3) 5000 UNITS CAPS capsule     fish oil-omega-3 fatty acids 1000 MG capsule     gabapentin (NEURONTIN) 300 MG capsule     HydrOXYzine Pamoate (VISTARIL PO)     lisinopril (PRINIVIL/ZESTRIL) 20 MG tablet     meperidine (DEMEROL) 50 MG tablet     Multiple Vitamins-Minerals (MULTIVITAMIN ADULT) TABS     omeprazole (PRILOSEC) 20 MG CR capsule     traZODone (DESYREL) 50 MG tablet     valACYclovir (VALTREX) 500 MG tablet     No current facility-administered medications for this visit.      Physical Exam:  Vital signs:      BP: 130/80 Pulse: 67     SpO2: 96 %          Estimated body mass index is 34.44 kg/m  as calculated from the following:    Height as of 10/20/22: 1.778 m (5' 10\").    Weight as of 10/20/22: 108.9 kg (240 lb).   He has full-strength in his bilateral upper and lower extremities.  Sensation is intact light touch throughout.  Patella " and ankle reflexes are 1+ bilaterally.  Results Reviewed:  I personally viewed the images of an MRI of the lumbar spine showing multilevel spondylosis with a disc herniation likely compressing the traversing or exiting nerve root at L3-4.  No other significant central canal stenosis.  No other significant foraminal stenosis present.  Assessment:  67-year-old male with a right leg radiculopathy and L3-4 disc herniation.  This could be related to the right leg pain.  Plan:  We discussed conservative and surgical management options going forward.  He could continue with conservative management should his symptoms be well controlled.  This would include physical therapy as well as injections and medications.  Should he fail this surgery may be an option including minimally invasive laminotomy and discectomy at L3-4 on the right.  Should he fail conservative management want to discuss surgery again I will have him follow-up with me.    I spent 60 minutes reviewing the patient's chart, interviewing examining the patient as well as discussing diagnosis and treatment options.    Jeremi Foss MD      Again, thank you for allowing me to participate in the care of your patient.        Sincerely,        Jeremi Foss MD

## 2022-11-02 NOTE — NURSING NOTE
"Linda Grover is a 67 year old male who presents for:  Chief Complaint   Patient presents with     Consult     Lumbar radiculopathy        Initial Vitals:  /80   Pulse 67   SpO2 96%  Estimated body mass index is 34.44 kg/m  as calculated from the following:    Height as of 10/20/22: 5' 10\" (1.778 m).    Weight as of 10/20/22: 240 lb (108.9 kg).. There is no height or weight on file to calculate BSA. BP completed using cuff size: large  Mild Pain (3)    Nursing Comments:     Kwame Montes MA    "

## 2022-11-02 NOTE — PROGRESS NOTES
"HPI:   67-year-old male with a history of multiple sclerosis and baseline tingling and numbness in both hands as well as spasticity in his legs.  He does have pain in his legs with increased activity.  He now also complains of right leg pain which encompasses the entire leg and goes down the back of the lower leg.  He has had recent injections which have helped.  Prior left leg pain was also improved after the most recent injection.  He denies any new weakness.  Denies any bowel or bladder control problems.  He does have a reported EMG which showed a possible L3 radiculopathy on the right.      Current Outpatient Medications   Medication     alemtuzumab 12 mg     atorvastatin (LIPITOR) 40 MG tablet     baclofen (LIORESAL) 10 MG tablet     baclofen (LIORESAL) 10 MG tablet     cholecalciferol (VITAMIN D3) 5000 UNITS CAPS capsule     fish oil-omega-3 fatty acids 1000 MG capsule     gabapentin (NEURONTIN) 300 MG capsule     HydrOXYzine Pamoate (VISTARIL PO)     lisinopril (PRINIVIL/ZESTRIL) 20 MG tablet     meperidine (DEMEROL) 50 MG tablet     Multiple Vitamins-Minerals (MULTIVITAMIN ADULT) TABS     omeprazole (PRILOSEC) 20 MG CR capsule     traZODone (DESYREL) 50 MG tablet     valACYclovir (VALTREX) 500 MG tablet     No current facility-administered medications for this visit.      Physical Exam:  Vital signs:      BP: 130/80 Pulse: 67     SpO2: 96 %          Estimated body mass index is 34.44 kg/m  as calculated from the following:    Height as of 10/20/22: 1.778 m (5' 10\").    Weight as of 10/20/22: 108.9 kg (240 lb).   He has full-strength in his bilateral upper and lower extremities.  Sensation is intact light touch throughout.  Patella and ankle reflexes are 1+ bilaterally.  Results Reviewed:  I personally viewed the images of an MRI of the lumbar spine showing multilevel spondylosis with a disc herniation likely compressing the traversing or exiting nerve root at L3-4.  No other significant central canal " stenosis.  No other significant foraminal stenosis present.  Assessment:  67-year-old male with a right leg radiculopathy and L3-4 disc herniation.  This could be related to the right leg pain.  Plan:  We discussed conservative and surgical management options going forward.  He could continue with conservative management should his symptoms be well controlled.  This would include physical therapy as well as injections and medications.  Should he fail this surgery may be an option including minimally invasive laminotomy and discectomy at L3-4 on the right.  Should he fail conservative management want to discuss surgery again I will have him follow-up with me.    I spent 60 minutes reviewing the patient's chart, interviewing examining the patient as well as discussing diagnosis and treatment options.    Jeremi Foss MD

## 2022-11-03 ASSESSMENT — ENCOUNTER SYMPTOMS
TREMORS: 0
LOSS OF CONSCIOUSNESS: 0
WEAKNESS: 0
DIZZINESS: 0
SPEECH CHANGE: 0
SEIZURES: 1
MEMORY LOSS: 0
NUMBNESS: 1
TINGLING: 1
HEADACHES: 0
DISTURBANCES IN COORDINATION: 0
PARALYSIS: 0

## 2022-11-03 ASSESSMENT — ANXIETY QUESTIONNAIRES
1. FEELING NERVOUS, ANXIOUS, OR ON EDGE: NOT AT ALL
2. NOT BEING ABLE TO STOP OR CONTROL WORRYING: NOT AT ALL
8. IF YOU CHECKED OFF ANY PROBLEMS, HOW DIFFICULT HAVE THESE MADE IT FOR YOU TO DO YOUR WORK, TAKE CARE OF THINGS AT HOME, OR GET ALONG WITH OTHER PEOPLE?: NOT DIFFICULT AT ALL
3. WORRYING TOO MUCH ABOUT DIFFERENT THINGS: NOT AT ALL
GAD7 TOTAL SCORE: 0
IF YOU CHECKED OFF ANY PROBLEMS ON THIS QUESTIONNAIRE, HOW DIFFICULT HAVE THESE PROBLEMS MADE IT FOR YOU TO DO YOUR WORK, TAKE CARE OF THINGS AT HOME, OR GET ALONG WITH OTHER PEOPLE: NOT DIFFICULT AT ALL
GAD7 TOTAL SCORE: 0
7. FEELING AFRAID AS IF SOMETHING AWFUL MIGHT HAPPEN: NOT AT ALL
6. BECOMING EASILY ANNOYED OR IRRITABLE: NOT AT ALL
7. FEELING AFRAID AS IF SOMETHING AWFUL MIGHT HAPPEN: NOT AT ALL
5. BEING SO RESTLESS THAT IT IS HARD TO SIT STILL: NOT AT ALL
4. TROUBLE RELAXING: NOT AT ALL

## 2022-11-03 ASSESSMENT — PAIN SCALES - PAIN ENJOYMENT GENERAL ACTIVITY SCALE (PEG)
INTERFERED_GENERAL_ACTIVITY: 2
INTERFERED_ENJOYMENT_LIFE: 2
PEG_TOTALSCORE: 2
INTERFERED_GENERAL_ACTIVITY: 2
AVG_PAIN_PASTWEEK: 2
AVG_PAIN_PASTWEEK: 2
PEG_TOTALSCORE: 2
INTERFERED_ENJOYMENT_LIFE: 2

## 2022-11-04 ENCOUNTER — OFFICE VISIT (OUTPATIENT)
Dept: ANESTHESIOLOGY | Facility: CLINIC | Age: 67
End: 2022-11-04
Attending: PSYCHIATRY & NEUROLOGY
Payer: COMMERCIAL

## 2022-11-04 VITALS — HEART RATE: 97 BPM | OXYGEN SATURATION: 95 % | DIASTOLIC BLOOD PRESSURE: 75 MMHG | SYSTOLIC BLOOD PRESSURE: 125 MMHG

## 2022-11-04 DIAGNOSIS — G35 MS (MULTIPLE SCLEROSIS) (H): ICD-10-CM

## 2022-11-04 DIAGNOSIS — M79.2 NEUROPATHIC PAIN: ICD-10-CM

## 2022-11-04 DIAGNOSIS — M54.16 LUMBAR RADICULOPATHY: ICD-10-CM

## 2022-11-04 PROCEDURE — 99205 OFFICE O/P NEW HI 60 MIN: CPT

## 2022-11-04 RX ORDER — GABAPENTIN 300 MG/1
CAPSULE ORAL
Qty: 150 CAPSULE | Refills: 1 | Status: SHIPPED | OUTPATIENT
Start: 2022-11-04 | End: 2022-12-19

## 2022-11-04 ASSESSMENT — PAIN SCALES - GENERAL: PAINLEVEL: MILD PAIN (2)

## 2022-11-04 NOTE — NURSING NOTE
Patient presents with:  Consult: New pt: leg pain      Mild Pain (2)     Pain Medications     Opioid Agonists Refills Start End     meperidine (DEMEROL) 50 MG tablet          Sig - Route: Take 50 mg by mouth every 4 hours as needed - Oral    Class: Historical          What medications are you using for pain? Gabapentin, baclofen    (New patients only) Have you been seen by another pain clinic/ provider? Rylee Randolph, EMT

## 2022-11-04 NOTE — PATIENT INSTRUCTIONS
1.  Pain Physical Therapy:  NO   Continue activity as tolerated at home.    2.  Pain Psychologist to address relaxation, behavioral change, coping style, and other factors important to improvement.  NO   3.  Diagnostic Studies:  Reviewed lumbar MRI    4.  Medication Management:   Increase gabapentin and titrate to goal dose:   300 mg with breakfast  300 early afternoon  600 with dinner for 3 days, then increase to;  600 mg w/breakfast  300 early afternoon  600 w/dinner. This is goal.   Care driving until you know how dosage increase affects your. Avoid drinking alcohol when you take the medication with dinner.    5.  Potential procedures: We will discuss spinal cord stimulator at future visit.     6.  Referrals:   7.  Follow up with JUAN DIEGO Medeiros CNP in 4-6 weeks before the week of Venkat. Video visit is okay.

## 2022-11-04 NOTE — NURSING NOTE
RN reviewed AVS with patient. Patient to contact clinic if any questions/concerns. Patient verbalized understanding.    Odalis Aguilar RNCC

## 2022-11-04 NOTE — LETTER
11/4/2022       RE: Linda Grover  [address]  [facility] [id number]     Dear Colleague,    Thank you for referring your patient, Linda Grover, to the [facility]IVE [facility] MANAGEMENT [place] at [facility]. Please see a copy of my visit note below.      [facility] Pain Management     Date of visit: 11/4/2022    Assessment:  Linda Grover is a 67 year old male with a past medical history significant for MS, HTN, lumbar radiculopathy, s/p right TKA who presents with complaints of BLE pain.     1. Leg pain - Symptoms have been present for 3 years, describes pain as mostly burning and tingling, sometimes aching and dull as well. He underwent L3-4 AIME x 2 with Dr. Camejo, 9/1/22 and 10/20/22, which he initially found helpful for pain, but notes short-term benefit for a few weeks. Lumbar MRI from 10/13/22 demonstrates multilevel degenerative changes. Etiology of pain is likely associated with multiple factors, including underlying MS, lumbar radiculopathy, possible neuropathy.     Visit diagnoses:   1. Lumbar radiculopathy    2. MS (multiple sclerosis) (H)    3. Neuropathic pain        Plan:  The following recommendations were given to the patient. Diagnosis, treatment options, risks, benefits, and alternatives were discussed, and all questions were answered. The patient expressed understanding of the plan for management.     I am recommending a multidisciplinary treatment plan to help this patient better manage his pain.  This includes:      1.  Pain Physical Therapy:  NO   Continue activity as tolerated at home.    2.  Pain Psychologist to address relaxation, behavioral change, coping style, and other factors important to improvement.  NO   3.  Diagnostic Studies:  Reviewed lumbar MRI    4.  Medication Management:   1. Increase gabapentin and titrate to goal dose:   a. 300 mg with breakfast  300 early afternoon   600 with dinner for 3 days, then increase to;  b. 600 mg w/breakfast  300 early afternoon  600 w/dinner. This is goal.   c. Care driving until you know how dosage increase affects your. Avoid drinking alcohol when you take the medication with dinner.    5.  Potential procedures: We will discuss spinal cord stimulator at future visit.     6.  Referrals:   7.  Follow up with JUAN DIEGO Medeiros CNP in 4-6 weeks before the week of Christmas. Video visit is okay.     Review of Electronic Chart: Today I have also reviewed available medical information in the patient's medical record at [facility] ([facility]) and Care Everywhere (if available), including relevant provider notes, laboratory work, and imaging.     Lucy Lazo DNP, JUAN DIEGO, AGNP-C  [facility] Pain Management         -------------------------------------------------------------------    Subjective     Reason for consultation:    Linda Grover is a 67 year old male who is seen in consultation today at the request of Dr. Farzana Louis for evaluation of his pain issues and recommendations for management, with specific emphasis on  M54.16 (ICD-10-CM) - Lumbar radiculopathy    Please see the [facility] health questionnaire which the patient completed and reviewed with me in detail (if available).     Review of [place] Prescription Monitoring Program (): No concern for abuse or misuse of controlled medications based on this report.     Review of Electronic Chart: Today I have also reviewed available medical information in the patient's medical record at [facility] ([facility]), including relevant provider notes, laboratory work, and imaging.     Pain medications are being prescribed by N/A.     Chief Complaint:    Chief Complaint   Patient presents with     Consult     New pt: leg pain         HPI:     Linda Grover is a 67 year old male presents with a chief complaint of BLE radicular pain.     The pain has been present  for 3 years .    The pain is Mild Pain (2) in severity.    The pain is described as burning, tingling, aching and dull.   The pain is alleviated by meds, rest.    It is exacerbated by lifting.    Things not tried - increased dose gabapentin  The patient otherwise denies bowel or bladder incontinence, parasthesias, weakness, saddle anesthesia, unintentional weight loss, or fever/chills/sweats.     Linda Grover has been seen at a pain clinic in the past.  Procedures with Dr. Camejo (see below).     -He has h/o MS.  -He previously saw neurologist, asked if symptoms could be related to MS.  -He has LESIs, initially found helpful but not lasting benefit.   -When driving a car and legs are bent, leg symptoms worsen.   -Procedures done recently by Dr. Camejo:   10/20/22 with Nell   PROCEDURE(S) PERFORMED:  1.  Interlaminar right L3-L4 lumbar epidural steroid injection   9/1/22  PROCEDURE(S) PERFORMED:  1.  Interlaminar lumbar epidural steroid injection (L3/4)     -He states he is interested in possible SCS therapy.   -He has not had LSB in past.   -It seems he has not engaged in a lot of conservative therapy yet for symptoms.       Pain Questionnaire    What number best describes your pain right now: 2  (0 = No pain to 10 = Worst pain imaginable)    How would you describe the pain? dull, aching    Which of the following worsen your pain? standing, walking    Which of the following improve or reduce your pain? medication, relaxation    What number best describes your average pain for the past week: 2  (0 = No pain to 10 = Worst pain imaginable)    What number best describes your LOWEST pain in past 24 hours: 1  (0 = No pain to 10 = Worst pain imaginable)    What number best describes your WORST pain in past 24 hours: 2  (0 = No pain to 10 = Worst pain imaginable)    When is your pain worst? Night, Constant    What non-medicine treatments have you already had for your pain? physical therapy, spine injections  (shots)    Have you tried treating your pain with medication? Yes    If yes, please answer the below questions -     What topical medications have you tried in the past but are no longer taking? None    What anti-convulsants (seizure medicines) have you tried in the past but are no longer taking? None    What anti-depressant medication have you tried in the past but are no longer taking? None    What sleep aid medications have you tried in the past but are no longer taking? Zolpidem (Ambien)    What opioid medications have you tried in the past but are no longer taking? Oxycodone (Percocet, OxyContin)    What NSAID medications have you tried in the past but are no longer taking? None    What muscle relaxer medications have you tried in the past but are no longer taking? None    What anti-migraine medications have you tried in the past but are no longer taking? None      Are you currently taking medications for your pain? Yes    If yes, please answer below -     During the past month, list all the medications that you have used for pain. Please list drug name, dose, and frequency taking:        Current Pain Treatments:    2. Medications:    Gabapentin 300 mg TID     2. Other therapies:    Injections - IAME with Dr. Camejo       Current Outpatient Medications   Medication     alemtuzumab 12 mg     atorvastatin (LIPITOR) 40 MG tablet     baclofen (LIORESAL) 10 MG tablet     baclofen (LIORESAL) 10 MG tablet     cholecalciferol (VITAMIN D3) 5000 UNITS CAPS capsule     fish oil-omega-3 fatty acids 1000 MG capsule     gabapentin (NEURONTIN) 300 MG capsule     HydrOXYzine Pamoate (VISTARIL PO)     lisinopril (PRINIVIL/ZESTRIL) 20 MG tablet     meperidine (DEMEROL) 50 MG tablet     Multiple Vitamins-Minerals (MULTIVITAMIN ADULT) TABS     omeprazole (PRILOSEC) 20 MG CR capsule     traZODone (DESYREL) 50 MG tablet     valACYclovir (VALTREX) 500 MG tablet     No current facility-administered medications for this visit.     No  Known Allergies   Past Medical History:   Diagnosis Date     Hypertension      MS (multiple sclerosis) (H)      Past Surgical History:   Procedure Laterality Date     CLOSE SECONDARY WOUND ABDOMEN N/A 04/15/2017    Procedure: CLOSE SECONDARY WOUND ABDOMEN;  Surgeon: Ever Keane MD;  Location: [facility] OR     GENITOURINARY SURGERY      Prostateectomy     INJECT EPIDURAL LUMBAR Right 10/20/2022    Procedure: Right L2-L3 and L3-L4 interlaminar epidural steroid injection;  Surgeon: Lisseth Camejo MD;  Location: [facility] OR     INJECT EPIDURAL LUMBAR / SACRAL SINGLE N/A 9/1/2022    Procedure: L2-L3 and L3-L4 interlaminar epidural steroid injection;  Surgeon: Lisseth Camejo MD;  Location: [facility] OR     LAPAROSCOPIC CHOLECYSTECTOMY N/A 02/15/2018    Procedure: LAPAROSCOPIC CHOLECYSTECTOMY;  LAPAROSCOPIC CHOLECYSTECTOMY;  Surgeon: Rolo Solares MD;  Location: [facility] OR     ORTHOPEDIC SURGERY Right     total right knee replacement     Family History   Family history unknown: Yes     Social History     Socioeconomic History     Marital status:    Tobacco Use     Smoking status: Former     Smokeless tobacco: Never   Substance and Sexual Activity     Alcohol use: Yes     Comment: glass of wine an evening      ROS: 10 point ROS neg other than the symptoms noted above in the HPI.      Objective      Diagnostic Testing - Imaging/Labs:  -Reviewed BMP from 10/21/22       Physical Exam  HENT:      Head: Normocephalic.   Pulmonary:      Effort: Pulmonary effort is normal.   Musculoskeletal:      Comments: Gait WNL   Neurological:      Mental Status: He is alert.   Psychiatric:         Mood and Affect: Mood normal.           BILLING TIME DOCUMENTATION:   The total TIME spent on this patient on the date of the encounter/appointment was 65 minutes.      TOTAL TIME includes:   Time spent preparing to see the patient (reviewing records and tests)   Time spent face to face (or over the phone) with the patient   Time spent ordering tests,  medications, procedures and referrals   Time spent Referring and communicating with other healthcare professionals   Time spent documenting clinical information in Epic         Sincerely,    JUAN DIEGO Medeiros CNP

## 2022-11-04 NOTE — PROGRESS NOTES
[facility] Pain Management     Date of visit: 11/4/2022    Assessment:  Linda Grover is a 67 year old male with a past medical history significant for MS, HTN, lumbar radiculopathy, s/p right TKA who presents with complaints of BLE pain.     1. Leg pain - Symptoms have been present for 3 years, describes pain as mostly burning and tingling, sometimes aching and dull as well. He underwent L3-4 AIME x 2 with Dr. Camejo, 9/1/22 and 10/20/22, which he initially found helpful for pain, but notes short-term benefit for a few weeks. Lumbar MRI from 10/13/22 demonstrates multilevel degenerative changes. Etiology of pain is likely associated with multiple factors, including underlying MS, lumbar radiculopathy, possible neuropathy.     Visit diagnoses:   1. Lumbar radiculopathy    2. MS (multiple sclerosis) (H)    3. Neuropathic pain        Plan:  The following recommendations were given to the patient. Diagnosis, treatment options, risks, benefits, and alternatives were discussed, and all questions were answered. The patient expressed understanding of the plan for management.     I am recommending a multidisciplinary treatment plan to help this patient better manage his pain.  This includes:      1.  Pain Physical Therapy:  NO   Continue activity as tolerated at home.    2.  Pain Psychologist to address relaxation, behavioral change, coping style, and other factors important to improvement.  NO   3.  Diagnostic Studies:  Reviewed lumbar MRI    4.  Medication Management:   1. Increase gabapentin and titrate to goal dose:   a. 300 mg with breakfast  300 early afternoon  600 with dinner for 3 days, then increase to;  b. 600 mg w/breakfast  300 early afternoon  600 w/dinner. This is goal.   c. Care driving until you know how dosage increase affects your. Avoid drinking alcohol when you take the medication with dinner.    5.  Potential procedures: We will discuss spinal cord stimulator at future visit.     6.   Referrals:   7.  Follow up with JUAN DIEGO Medeiros CNP in 4-6 weeks before the week of Christmas. Video visit is okay.     Review of Electronic Chart: Today I have also reviewed available medical information in the patient's medical record at [facility] ([facility]) and Care Everywhere (if available), including relevant provider notes, laboratory work, and imaging.     Lucy Lazo DNP, JUAN DIEGO, AGNP-C  [facility] Pain Management         -------------------------------------------------------------------    Subjective     Reason for consultation:    Linda Grover is a 67 year old male who is seen in consultation today at the request of Dr. Farzana Louis for evaluation of his pain issues and recommendations for management, with specific emphasis on  M54.16 (ICD-10-CM) - Lumbar radiculopathy    Please see the [facility] [place] health questionnaire which the patient completed and reviewed with me in detail (if available).     Review of [place] Prescription Monitoring Program (): No concern for abuse or misuse of controlled medications based on this report.     Review of Electronic Chart: Today I have also reviewed available medical information in the patient's medical record at [facility] ([facility]), including relevant provider notes, laboratory work, and imaging.     Pain medications are being prescribed by N/A.     Chief Complaint:    Chief Complaint   Patient presents with     Consult     New pt: leg pain         HPI:     Linda Grover is a 67 year old male presents with a chief complaint of BLE radicular pain.     The pain has been present for 3 years .    The pain is Mild Pain (2) in severity.    The pain is described as burning, tingling, aching and dull.   The pain is alleviated by meds, rest.    It is exacerbated by lifting.    Things not tried - increased dose gabapentin  The patient otherwise denies bowel or bladder incontinence, parasthesias, weakness, saddle anesthesia,  unintentional weight loss, or fever/chills/sweats.     Linda Grover has been seen at a pain clinic in the past.  Procedures with Dr. Camejo (see below).     -He has h/o MS.  -He previously saw neurologist, asked if symptoms could be related to MS.  -He has LESIs, initially found helpful but not lasting benefit.   -When driving a car and legs are bent, leg symptoms worsen.   -Procedures done recently by Dr. Camejo:   10/20/22 with Nell   PROCEDURE(S) PERFORMED:  1.  Interlaminar right L3-L4 lumbar epidural steroid injection   9/1/22  PROCEDURE(S) PERFORMED:  1.  Interlaminar lumbar epidural steroid injection (L3/4)     -He states he is interested in possible SCS therapy.   -He has not had LSB in past.   -It seems he has not engaged in a lot of conservative therapy yet for symptoms.       Pain Questionnaire    What number best describes your pain right now: 2  (0 = No pain to 10 = Worst pain imaginable)    How would you describe the pain? dull, aching    Which of the following worsen your pain? standing, walking    Which of the following improve or reduce your pain? medication, relaxation    What number best describes your average pain for the past week: 2  (0 = No pain to 10 = Worst pain imaginable)    What number best describes your LOWEST pain in past 24 hours: 1  (0 = No pain to 10 = Worst pain imaginable)    What number best describes your WORST pain in past 24 hours: 2  (0 = No pain to 10 = Worst pain imaginable)    When is your pain worst? Night, Constant    What non-medicine treatments have you already had for your pain? physical therapy, spine injections (shots)    Have you tried treating your pain with medication? Yes    If yes, please answer the below questions -     What topical medications have you tried in the past but are no longer taking? None    What anti-convulsants (seizure medicines) have you tried in the past but are no longer taking? None    What anti-depressant medication have you tried in  the past but are no longer taking? None    What sleep aid medications have you tried in the past but are no longer taking? Zolpidem (Ambien)    What opioid medications have you tried in the past but are no longer taking? Oxycodone (Percocet, OxyContin)    What NSAID medications have you tried in the past but are no longer taking? None    What muscle relaxer medications have you tried in the past but are no longer taking? None    What anti-migraine medications have you tried in the past but are no longer taking? None      Are you currently taking medications for your pain? Yes    If yes, please answer below -     During the past month, list all the medications that you have used for pain. Please list drug name, dose, and frequency taking:        Current Pain Treatments:    2. Medications:    Gabapentin 300 mg TID     2. Other therapies:    Injections - AIME with Dr. Camejo       Current Outpatient Medications   Medication     alemtuzumab 12 mg     atorvastatin (LIPITOR) 40 MG tablet     baclofen (LIORESAL) 10 MG tablet     baclofen (LIORESAL) 10 MG tablet     cholecalciferol (VITAMIN D3) 5000 UNITS CAPS capsule     fish oil-omega-3 fatty acids 1000 MG capsule     gabapentin (NEURONTIN) 300 MG capsule     HydrOXYzine Pamoate (VISTARIL PO)     lisinopril (PRINIVIL/ZESTRIL) 20 MG tablet     meperidine (DEMEROL) 50 MG tablet     Multiple Vitamins-Minerals (MULTIVITAMIN ADULT) TABS     omeprazole (PRILOSEC) 20 MG CR capsule     traZODone (DESYREL) 50 MG tablet     valACYclovir (VALTREX) 500 MG tablet     No current facility-administered medications for this visit.     No Known Allergies   Past Medical History:   Diagnosis Date     Hypertension      MS (multiple sclerosis) (H)      Past Surgical History:   Procedure Laterality Date     CLOSE SECONDARY WOUND ABDOMEN N/A 04/15/2017    Procedure: CLOSE SECONDARY WOUND ABDOMEN;  Surgeon: Ever Keane MD;  Location: [facility] OR     GENITOURINARY SURGERY      Prostateectomy      INJECT EPIDURAL LUMBAR Right 10/20/2022    Procedure: Right L2-L3 and L3-L4 interlaminar epidural steroid injection;  Surgeon: Lisseth Camejo MD;  Location: [facility] OR     INJECT EPIDURAL LUMBAR / SACRAL SINGLE N/A 9/1/2022    Procedure: L2-L3 and L3-L4 interlaminar epidural steroid injection;  Surgeon: Lisseth Camejo MD;  Location: [facility] OR     LAPAROSCOPIC CHOLECYSTECTOMY N/A 02/15/2018    Procedure: LAPAROSCOPIC CHOLECYSTECTOMY;  LAPAROSCOPIC CHOLECYSTECTOMY;  Surgeon: Rolo Solares MD;  Location: [facility] OR     ORTHOPEDIC SURGERY Right     total right knee replacement     Family History   Family history unknown: Yes     Social History     Socioeconomic History     Marital status:    Tobacco Use     Smoking status: Former     Smokeless tobacco: Never   Substance and Sexual Activity     Alcohol use: Yes     Comment: glass of wine an evening      ROS: 10 point ROS neg other than the symptoms noted above in the HPI.      Objective      Diagnostic Testing - Imaging/Labs:  -Reviewed BMP from 10/21/22       Physical Exam  HENT:      Head: Normocephalic.   Pulmonary:      Effort: Pulmonary effort is normal.   Musculoskeletal:      Comments: Gait WNL   Neurological:      Mental Status: He is alert.   Psychiatric:         Mood and Affect: Mood normal.           BILLING TIME DOCUMENTATION:   The total TIME spent on this patient on the date of the encounter/appointment was 65 minutes.      TOTAL TIME includes:   Time spent preparing to see the patient (reviewing records and tests)   Time spent face to face (or over the phone) with the patient   Time spent ordering tests, medications, procedures and referrals   Time spent Referring and communicating with other healthcare professionals   Time spent documenting clinical information in Epic

## 2022-12-12 ASSESSMENT — ANXIETY QUESTIONNAIRES
2. NOT BEING ABLE TO STOP OR CONTROL WORRYING: NOT AT ALL
4. TROUBLE RELAXING: NOT AT ALL
IF YOU CHECKED OFF ANY PROBLEMS ON THIS QUESTIONNAIRE, HOW DIFFICULT HAVE THESE PROBLEMS MADE IT FOR YOU TO DO YOUR WORK, TAKE CARE OF THINGS AT HOME, OR GET ALONG WITH OTHER PEOPLE: NOT DIFFICULT AT ALL
GAD7 TOTAL SCORE: 0
GAD7 TOTAL SCORE: 0
5. BEING SO RESTLESS THAT IT IS HARD TO SIT STILL: NOT AT ALL
7. FEELING AFRAID AS IF SOMETHING AWFUL MIGHT HAPPEN: NOT AT ALL
1. FEELING NERVOUS, ANXIOUS, OR ON EDGE: NOT AT ALL
7. FEELING AFRAID AS IF SOMETHING AWFUL MIGHT HAPPEN: NOT AT ALL
6. BECOMING EASILY ANNOYED OR IRRITABLE: NOT AT ALL
8. IF YOU CHECKED OFF ANY PROBLEMS, HOW DIFFICULT HAVE THESE MADE IT FOR YOU TO DO YOUR WORK, TAKE CARE OF THINGS AT HOME, OR GET ALONG WITH OTHER PEOPLE?: NOT DIFFICULT AT ALL
3. WORRYING TOO MUCH ABOUT DIFFERENT THINGS: NOT AT ALL

## 2022-12-13 ENCOUNTER — OFFICE VISIT (OUTPATIENT)
Dept: NEUROLOGY | Facility: CLINIC | Age: 67
End: 2022-12-13
Attending: PSYCHIATRY & NEUROLOGY
Payer: COMMERCIAL

## 2022-12-13 VITALS
SYSTOLIC BLOOD PRESSURE: 141 MMHG | HEART RATE: 49 BPM | OXYGEN SATURATION: 95 % | BODY MASS INDEX: 35.48 KG/M2 | WEIGHT: 247.3 LBS | DIASTOLIC BLOOD PRESSURE: 77 MMHG

## 2022-12-13 DIAGNOSIS — R25.2 SPASTICITY: ICD-10-CM

## 2022-12-13 DIAGNOSIS — G35 MS (MULTIPLE SCLEROSIS) (H): Primary | ICD-10-CM

## 2022-12-13 DIAGNOSIS — M54.16 LUMBAR RADICULOPATHY: ICD-10-CM

## 2022-12-13 PROCEDURE — G0463 HOSPITAL OUTPT CLINIC VISIT: HCPCS | Performed by: PSYCHIATRY & NEUROLOGY

## 2022-12-13 PROCEDURE — 99214 OFFICE O/P EST MOD 30 MIN: CPT | Performed by: PSYCHIATRY & NEUROLOGY

## 2022-12-13 RX ORDER — BACLOFEN 20 MG/1
20 TABLET ORAL 3 TIMES DAILY
Qty: 270 TABLET | Refills: 3 | Status: SHIPPED | OUTPATIENT
Start: 2022-12-13 | End: 2024-01-12

## 2022-12-13 ASSESSMENT — PAIN SCALES - GENERAL: PAINLEVEL: NO PAIN (0)

## 2022-12-13 NOTE — PROGRESS NOTES
Date of Service: 12/13/2022    [facility] Neurology   MS Clinic Follow-up     Subjective: 67-year-old man with a history of hypertension, prostate cancer status post excision in October 2020, psoriasis, and multiple sclerosis who presents for evaluation.    He is accompanied by his wife, Gayathri, but provides history independently.    He does not report any new symptoms related to MS.    He continues to struggle with pain in his legs.  He has been seen by neurosurgery who offered a minimally invasive surgery.  He elected for conservative management.  He is now following with pain medicine.  He has increased his dose of gabapentin.  He admits that his midday compliance is irregular.  With increased dosage of gabapentin he is experiencing a decrease in pain at night.  He continues to struggle with pain in the legs that is worse if he stands for prolonged period of time.  Any for approximately 10 minutes will result in increased symptoms in the right leg more so than the left.  Symptoms can also be provoked by walking a lot.    Recall that he does struggle with spasticity that comes on with walking.  He has increased baclofen to 20 mg 3 times per day.  He does not always get the midday dosing.  However he finds that walking is a bit easier since he has increased his baclofen.  He estimates that he can walk for 15 to 20 minutes before he needs to stop and take a break.    [facility] is [facility] without the physical therapist approximately 2 times per week.  Not have a home program that he can do independently.    He notices that both of his hands hurt significantly when they are exposed to cold.    Disease onset: age 55, paresthesias in UE with hand weakness, was on enbrel for psoriasis  Last relapse: unk    DMD hx:   Copaxone 2010 - 2016, d/c progression of disease though not clear what clinical symptoms were present  tysabri 2016 - 2017, d/c JCV seroconversion   lemtrada 2017 & 11/2018, now  undergoing post treatment monitoring       No Known Allergies    Current Outpatient Medications   Medication     atorvastatin (LIPITOR) 40 MG tablet     baclofen (LIORESAL) 10 MG tablet     baclofen (LIORESAL) 10 MG tablet     cholecalciferol (VITAMIN D3) 5000 UNITS CAPS capsule     gabapentin (NEURONTIN) 300 MG capsule     HydrOXYzine Pamoate (VISTARIL PO)     lisinopril (PRINIVIL/ZESTRIL) 20 MG tablet     Multiple Vitamins-Minerals (MULTIVITAMIN ADULT) TABS     omeprazole (PRILOSEC) 20 MG CR capsule     traZODone (DESYREL) 50 MG tablet     valACYclovir (VALTREX) 500 MG tablet     alemtuzumab 12 mg     fish oil-omega-3 fatty acids 1000 MG capsule     meperidine (DEMEROL) 50 MG tablet     No current facility-administered medications for this visit.        Past medical, surgical, social and family history was personally reviewed. Pertinent details noted above.     Physical Examination:   BP (!) 141/77 (BP Location: Right arm, Patient Position: Sitting, Cuff Size: Adult Large)   Pulse (!) 49   Wt 112.2 kg (247 lb 4.8 oz)   SpO2 95%   BMI 35.48 kg/m      General: no acute distress  Cranial nerves:   VFFC  PERRL w/no RAPD  EOM full w/no CHARLIE   Face symmetric  Hearing intact  No dysarthria   Motor:   Tone is mildly increased in the LE  Bulk is normal     R L  Deltoid  5 5  Biceps  5 5  Triceps 5 5  Wrist ext 5 5  Finger ext 5 5  Finger abd 5 5    Hip flexion 5 5  Knee flexion 5 5  Knee ext 5 5  Ankle d/f 5 5    Reflexes: absent in the right patella, normal on the left, babinski present on the left  Sensory: vibration is mildly reduced in the ankles  Romberg is absent  Coordination: no ataxia or dysmetria  Gait: Gait is mildly antalgic, tandem gait is intact    Tests/Imaging:   CSF >5 ocb, IgG ind 1.07    MAGDALENA virus Ab reported positive  Vitamin D 70    MRI brain   2015 - few periventricular lesions, deep white matter ovoid lesion in the left periatrial region, multiple non specific punctate white matter lesions, gd-    4/2021 - no new lesions    MRI cervical spine   2015 - dorsal cord lesion at c1, gd-   4/2021 - no new lesions    MRI thoracic spine   2015 - negative for ms lesions, gd-   4/2021 - no new lesions      Assessment: 67 year old man with multiple sclerosis who is s/p lemtrada and appears to be clinically and radiologically stable.      We discussed the importance of continuing with baclofen 20 mg 3 times per day.  This is to manage spasticity that is provoked by exercise.    Was encouraged to reach out to [facility] to follow-up with his physical therapist so that he can obtain a home program for his back pain.    Discussed expectant management for his multiple sclerosis now that he has completed Lemtrada surveillance.  Radiologic surveillance is advised in 6 months    I will defer management of lumbar radiculopathy to pain medicine.    Plan:     - baclofen 20 mg tid   -MRI in 6 months  - Follow-up in 6 months    Note was completed with the assistance of Dragon Fluency software which can often result in accidental word substitutions.     A total of 30 minutes on the date of service were spent in the care of this patient.   Farzana Louis MD on 12/13/2022 at 10:03 AM

## 2022-12-13 NOTE — LETTER
12/13/2022       RE: Linda Grover  [id number]  [facility] [address]     Dear Colleague,    Thank you for referring your patient, Linda Grover, to the [facility] [place] at [facility]. Please see a copy of my visit note below.    Date of Service: 12/13/2022    [facility] Neu[facility]   MS Clinic Follow-up     Subjective: 67-year-old man with a history of hypertension, prostate cancer status post excision in October 2020, psoriasis, and multiple sclerosis who presents for evaluation.    He is accompanied by his wife, Gayathri, but provides history independently.    He does not report any new symptoms related to MS.    He continues to struggle with pain in his legs.  He has been seen by neurosurgery who offered a minimally invasive surgery.  He elected for conservative management.  He is now following with pain medicine.  He has increased his dose of gabapentin.  He admits that his midday compliance is irregular.  With increased dosage of gabapentin he is experiencing a decrease in pain at night.  He continues to struggle with pain in the legs that is worse if he stands for prolonged period of time.  Any for approximately 10 minutes will result in increased symptoms in the right leg more so than the left.  Symptoms can also be provoked by walking a lot.    Recall that he does struggle with spasticity that comes on with walking.  He has increased baclofen to 20 mg 3 times per day.  He does not always get the midday dosing.  However he finds that walking is a bit easier since he has increased his baclofen.  He estimates that he can walk for 15 to 20 minutes before he needs to stop and take a break.    He is doing [facility] without the physical therapist approximately 2 times per week.  Not have a home program that he can do independently.    He notices that both of his hands hurt significantly  when they are exposed to cold.    Disease onset: age 55, paresthesias in UE with hand weakness, was on enbrel for psoriasis  Last relapse: unk    DMD hx:   Copaxone 2010 - 2016, d/c progression of disease though not clear what clinical symptoms were present  tysabri 2016 - 2017, d/c JCV seroconversion   lemtrada 2017 & 11/2018, now undergoing post treatment monitoring       No Known Allergies    Current Outpatient Medications   Medication     atorvastatin (LIPITOR) 40 MG tablet     baclofen (LIORESAL) 10 MG tablet     baclofen (LIORESAL) 10 MG tablet     cholecalciferol (VITAMIN D3) 5000 UNITS CAPS capsule     gabapentin (NEURONTIN) 300 MG capsule     HydrOXYzine Pamoate (VISTARIL PO)     lisinopril (PRINIVIL/ZESTRIL) 20 MG tablet     Multiple Vitamins-Minerals (MULTIVITAMIN ADULT) TABS     omeprazole (PRILOSEC) 20 MG CR capsule     traZODone (DESYREL) 50 MG tablet     valACYclovir (VALTREX) 500 MG tablet     alemtuzumab 12 mg     fish oil-omega-3 fatty acids 1000 MG capsule     meperidine (DEMEROL) 50 MG tablet     No current facility-administered medications for this visit.        Past medical, surgical, social and family history was personally reviewed. Pertinent details noted above.     Physical Examination:   BP (!) 141/77 (BP Location: Right arm, Patient Position: Sitting, Cuff Size: Adult Large)   Pulse (!) 49   Wt 112.2 kg (247 lb 4.8 oz)   SpO2 95%   BMI 35.48 kg/m      General: no acute distress  Cranial nerves:   VFFC  PERRL w/no RAPD  EOM full w/no CHARLIE   Face symmetric  Hearing intact  No dysarthria   Motor:   Tone is mildly increased in the LE  Bulk is normal     R L  Deltoid  5 5  Biceps  5 5  Triceps 5 5  Wrist ext 5 5  Finger ext 5 5  Finger abd 5 5    Hip flexion 5 5  Knee flexion 5 5  Knee ext 5 5  Ankle d/f 5 5    Reflexes: absent in the right patella, normal on the left, babinski present on the left  Sensory: vibration is mildly reduced in the ankles  Romberg is absent  Coordination: no  ataxia or dysmetria  Gait: Gait is mildly antalgic, tandem gait is intact    Tests/Imaging:   CSF >5 ocb, IgG ind 1.07    MAGDALENA virus Ab reported positive  Vitamin D 70    MRI brain   2015 - few periventricular lesions, deep white matter ovoid lesion in the left periatrial region, multiple non specific punctate white matter lesions, gd-   4/2021 - no new lesions    MRI cervical spine   2015 - dorsal cord lesion at c1, gd-   4/2021 - no new lesions    MRI thoracic spine   2015 - negative for ms lesions, gd-   4/2021 - no new lesions      Assessment: 67 year old man with multiple sclerosis who is s/p lemtrada and appears to be clinically and radiologically stable.      We discussed the importance of continuing with baclofen 20 mg 3 times per day.  This is to manage spasticity that is provoked by exercise.    Was encouraged to reach out to [facility] to follow-up with his physical therapist so that he can obtain a home program for his back pain.    Discussed expectant management for his multiple sclerosis now that he has completed Lemtrada surveillance.  Radiologic surveillance is advised in 6 months    I will defer management of lumbar radiculopathy to pain medicine.    Plan:     - baclofen 20 mg tid   -MRI in 6 months  - Follow-up in 6 months    Note was completed with the assistance of Dragon Fluency software which can often result in accidental word substitutions.     A total of 30 minutes on the date of service were spent in the care of this patient.   Farzana Louis MD on 12/13/2022 at 10:03 AM          Again, thank you for allowing me to participate in the care of your patient.      Sincerely,    Farzana Louis MD

## 2022-12-13 NOTE — PATIENT INSTRUCTIONS
Mri in 6 months    Continue baclofen 20 mg three times per day     Continue working with pain medicine     Contact [facility] to see PT for a home program     Stop valtrex - your last lymphocyte count was normal    Follow up in 6 months

## 2022-12-18 ASSESSMENT — PAIN SCALES - PAIN ENJOYMENT GENERAL ACTIVITY SCALE (PEG)
INTERFERED_GENERAL_ACTIVITY: 1
PEG_TOTALSCORE: 2
INTERFERED_ENJOYMENT_LIFE: 2
AVG_PAIN_PASTWEEK: 3

## 2022-12-19 ENCOUNTER — VIRTUAL VISIT (OUTPATIENT)
Dept: ANESTHESIOLOGY | Facility: CLINIC | Age: 67
End: 2022-12-19
Payer: COMMERCIAL

## 2022-12-19 DIAGNOSIS — M79.2 NEUROPATHIC PAIN: ICD-10-CM

## 2022-12-19 DIAGNOSIS — M54.16 LUMBAR RADICULOPATHY: Primary | ICD-10-CM

## 2022-12-19 DIAGNOSIS — G35 MS (MULTIPLE SCLEROSIS) (H): ICD-10-CM

## 2022-12-19 PROCEDURE — 99214 OFFICE O/P EST MOD 30 MIN: CPT | Mod: GT

## 2022-12-19 RX ORDER — GABAPENTIN 300 MG/1
CAPSULE ORAL
Qty: 240 CAPSULE | Refills: 1 | Status: SHIPPED | OUTPATIENT
Start: 2022-12-19 | End: 2023-04-03

## 2022-12-19 ASSESSMENT — PAIN SCALES - GENERAL: PAINLEVEL: MILD PAIN (3)

## 2022-12-19 NOTE — PROGRESS NOTES
Linda is a 67 year old who is being evaluated via a billable video visit.      How would you like to obtain your AVS? MyChart  If the video visit is dropped, the invitation should be resent by: Send to e-mail at: [email or web address]  Will anyone else be joining your video visit? No

## 2022-12-19 NOTE — LETTER
12/19/2022       RE: Linda Grover  [id number]  [facility] [address]     Dear Colleague,    Thank you for referring your patient, Linda Grover, to the [facility] FOR COMPREHENSIVE PAIN MANAGEMENT [place] at [facility]. Please see a copy of my visit note below.    Video-Visit Details    Video Start Time: 10:37 AM    Type of service:  Video Visit    Video End Time:10:59 AM    Originating Location (pt. Location): Home        Distant Location (provider location):  On-site    Platform used for Video Visit: [facility] Pain Management     Date of visit: 12/19/2022      Assessment:   Linda Grover is a 67 year old male with a past medical history significant for MS, HTN, lumbar radiculopathy, s/p right TKA who presents with complaints of BLE pain.      1. Leg pain - Symptoms have been present for 3 years, describes pain as mostly burning and tingling, sometimes aching and dull as well. He underwent L3-4 AIME x 2 with Dr. Camejo, 9/1/22 and 10/20/22, which he initially found helpful for pain, but notes short-term benefit for a few weeks. Lumbar MRI from 10/13/22 demonstrates multilevel degenerative changes. Etiology of pain is likely associated with multiple factors, including underlying MS, lumbar radiculopathy, possible neuropathy.     Visit Diagnoses:  1. Lumbar radiculopathy    2. MS (multiple sclerosis) (H)    3. Neuropathic pain        Plan:  Diagnosis reviewed, treatment option addressed, and risk/benifits discussed.  Self-care instructions given.  I am recommending a multidisciplinary treatment plan to help this patient better manage their pain.                  1.  Pain Physical Therapy:  NO   Continue activity as tolerated at home.               2.  Pain Psychologist to address relaxation, behavioral change, coping style, and other factors important to improvement.  NO              3.  Diagnostic Studies:   Reviewed lumbar MRI               4.  Medication Management:   2. Increase gabapentin and titrate to goal dose:   a. 600mg with breakfast  600mg early afternoon  600mg with dinner for 3 days, then increase to;  b. 600mg w/breakfast  600mg early afternoon  900mg w/dinner for 3 days, then increase to;  c. 900 mg w/ breakfast  600 mg early afternoon  900mg w/dinner, this is goal.   d. Care driving until you know how dosage increase affects your. Avoid drinking alcohol when you take the medication with dinner.               5.  Potential procedures:     1. We will discuss spinal cord stimulator at future visit.      2. He had L3-4 AIME with Dr. Camejo in the past, found helpful with right leg pain. He will contact the clinic if interested in repeat AIME in between visits.               6.  Referrals:              7.  Follow up with JUAN DIEGO Medeiros CNP in 8-12 weeks, after you return from [place]    Visit discussion: He is considering lumbar fusion surgery but is aware that recovery can be difficult and improvement in pain is not guaranteed.     Review of Electronic Chart: Today I have also reviewed available medical information in the patient's medical record at [facility] ([facility]) and Care Everywhere (if available), including relevant provider notes, laboratory work, and imaging.     Lucy Lazo DNP, JUAN DIEGO, AGNP-C  [facility] Pain Management     -------------------------------------------------    Subjective:    Chief complaint:   Chief Complaint   Patient presents with     Follow Up     Follow-up Right leg, calf.Left thigh       Interval history:  Linda Grover is a 67 year old male last seen on 11/4/22.  They are a patient of mine seen in follow up.     Recommendations/plan at the last visit included:              1.  Pain Physical Therapy:  NO   Continue activity as tolerated at home.               2.  Pain Psychologist to address relaxation, behavioral change, coping style, and other factors  important to improvement.  NO              3.  Diagnostic Studies:  Reviewed lumbar MRI               4.  Medication Management:   3. Increase gabapentin and titrate to goal dose:   a. 300 mg with breakfast  300 early afternoon  600 with dinner for 3 days, then increase to;  b. 600 mg w/breakfast  300 early afternoon  600 w/dinner. This is goal.    c. Care driving until you know how dosage increase affects your. Avoid drinking alcohol when you take the medication with dinner.               5.  Potential procedures: We will discuss spinal cord stimulator at future visit.                6.  Referrals:              7.  Follow up with JUAN DIEGO Medeiros CNP in 4-6 weeks before the week of Venkat. Video visit is okay.     Since his last visit, Linda Grover reports:  -His pain has improved since last visit.   -RLE pain was waking him up overnight, this has improved a lot.   -LLE is still problematic again, particularly with driving and sitting upright.   -Left leg pain increases when knee is bent at 90 degree angle.   -He denies side effects from gabapentin.   -He is interested in increased dosage to see if left leg symptoms improve.   -Dr. Louis is not in favor of changing baclofen dosage at this time.   -He is still considering lumbar fusion.   -Wife Gayathri present for this visit.   -Their son is living in [place] and on his way home for the holidays, was delayed due to snow storm in [facility].       HPI/Interval history from last visit on 11/4/22:  Linda Grover is a 67 year old male presents with a chief complaint of BLE radicular pain.      The pain has been present for 3 years .    The pain is Mild Pain (2) in severity.    The pain is described as burning, tingling, aching and dull.   The pain is alleviated by meds, rest.    It is exacerbated by lifting.    Things not tried - increased dose gabapentin  The patient otherwise denies bowel or bladder incontinence, parasthesias, weakness, saddle anesthesia,  unintentional weight loss, or fever/chills/sweats.      Linda Grover has been seen at a pain clinic in the past.  Procedures with Dr. Camejo (see below).      -He has h/o MS.  -He previously saw neurologist, asked if symptoms could be related to MS.  -He has LESIs, initially found helpful but not lasting benefit.   -When driving a car and legs are bent, leg symptoms worsen.   -Procedures done recently by Dr. Camejo:   10/20/22 with Nell   PROCEDURE(S) PERFORMED:  1.  Interlaminar right L3-L4 lumbar epidural steroid injection   9/1/22  PROCEDURE(S) PERFORMED:  1.  Interlaminar lumbar epidural steroid injection (L3/4)      -He states he is interested in possible SCS therapy.   -He has not had LSB in past.   -It seems he has not engaged in a lot of conservative therapy yet for symptoms.       Pain Information:   Pain rating: averages 2/10 on a 0-10 scale.      Current Pain Treatments:    Current Pain Treatments:     Medications:               Gabapentin 300 mg TID                Other therapies:               Injections - AIME with Dr. Camejo       Current MME: 0    Review of [place] Prescription Monitoring Program (): No concern for abuse or misuse of controlled medications based on this report. Reviewed - appears appropriate.     Annual Controlled Substance Agreement/UDS due date: N/A    Past pain treatments:  LESI      Medications:  Current Outpatient Medications   Medication Sig Dispense Refill     atorvastatin (LIPITOR) 40 MG tablet Take 40 mg by mouth At Bedtime       baclofen (LIORESAL) 20 MG tablet Take 1 tablet (20 mg) by mouth 3 times daily 270 tablet 3     cholecalciferol (VITAMIN D3) 5000 UNITS CAPS capsule Take 7,000 Units by mouth daily        gabapentin (NEURONTIN) 300 MG capsule Take 900 mg with breakfast   600 mg in early afternoon   900 mg with dinner. Titrate to goal dose per clinic instructions provided at appointment. 240 capsule 1     HydrOXYzine Pamoate (VISTARIL PO) Take 50 mg by mouth 3  times daily       lisinopril (PRINIVIL/ZESTRIL) 20 MG tablet Take 20 mg by mouth At Bedtime       Multiple Vitamins-Minerals (MULTIVITAMIN ADULT) TABS Take 1 tablet by mouth daily       omeprazole (PRILOSEC) 20 MG CR capsule Take 20 mg by mouth daily       traZODone (DESYREL) 50 MG tablet Take 1-2 tablets ( mg) by mouth At Bedtime 180 tablet 3       Medical History: any changes in medical history since they were last seen? No      Objective:    Physical Exam:  There were no vitals taken for this visit.  GENERAL: Healthy, alert and no distress  EYES: Eyes grossly normal to inspection.  No discharge or erythema, or obvious scleral/conjunctival abnormalities.  RESP: No audible wheeze, cough, or visible cyanosis.  No visible retractions or increased work of breathing.    SKIN: Visible skin clear. No significant rash, abnormal pigmentation or lesions.  NEURO: Cranial nerves grossly intact.  Mentation and speech appropriate for age.  PSYCH: Mentation appears normal, affect normal/bright, judgement and insight intact, normal speech and appearance well-groomed.    Imaging:  None     BILLING TIME DOCUMENTATION:   The total TIME spent on this patient on the date of the encounter/appointment was 37 minutes.      TOTAL TIME includes:   Time spent preparing to see the patient (reviewing records and tests)   Time spent face to face (or over the phone) with the patient   Time spent ordering tests, medications, procedures and referrals   Time spent Referring and communicating with other healthcare professionals   Time spent documenting clinical information in Epic             Linda is a 67 year old who is being evaluated via a billable video visit.      How would you like to obtain your AVS? MyChart  If the video visit is dropped, the invitation should be resent by: Send to e-mail at: [email or web address]  Will anyone else be joining your video visit? No          Again, thank you for allowing me to participate in the care of  your patient.      Sincerely,    JUAN DIEGO Medeiros CNP

## 2022-12-19 NOTE — NURSING NOTE
Patient presents with:  Follow Up: Follow-up Right leg, calf.Left thigh      Mild Pain (3)         What medications are you using for pain? Gabapentin, Baclofen, Ibuprofen    (New patients only) Have you been seen by another pain clinic/ provider? no    (Return Patients only) What refills are you needing today? no

## 2022-12-19 NOTE — PATIENT INSTRUCTIONS
1.  Pain Physical Therapy:  NO   Continue activity as tolerated at home.               2.  Pain Psychologist to address relaxation, behavioral change, coping style, and other factors important to improvement.  NO              3.  Diagnostic Studies:  Reviewed lumbar MRI               4.  Medication Management:   Increase gabapentin and titrate to goal dose:   600mg with breakfast  600mg early afternoon  600mg with dinner for 3 days, then increase to;  600mg w/breakfast  600mg early afternoon  900mg w/dinner for 3 days, then increase to;  900 mg w/ breakfast  600 mg early afternoon  900mg w/dinner, this is goal.   Care driving until you know how dosage increase affects your. Avoid drinking alcohol when you take the medication with dinner.               5.  Potential procedures:     1. We will discuss spinal cord stimulator at future visit.      2. He had L3-4 AIME with Dr. Camejo in the past, found helpful with right leg pain. He will contact the clinic if interested in repeat AIME in between visits.               6.  Referrals:              7.  Follow up with JUAN DIEGO Medeiros CNP in 8-12 weeks, after you return from [place]

## 2022-12-19 NOTE — PROGRESS NOTES
Video-Visit Details    Video Start Time: 10:37 AM    Type of service:  Video Visit    Video End Time:10:59 AM    Originating Location (pt. Location): Home        Distant Location (provider location):  On-site    Platform used for Video Visit: [facility]     Date of visit: 12/19/2022      Assessment:   Linda Grover is a 67 year old male with a past medical history significant for MS, HTN, lumbar radiculopathy, s/p right TKA who presents with complaints of BLE pain.      1. Leg pain - Symptoms have been present for 3 years, describes pain as mostly burning and tingling, sometimes aching and dull as well. He underwent L3-4 AIME x 2 with Dr. Camejo, 9/1/22 and 10/20/22, which he initially found helpful for pain, but notes short-term benefit for a few weeks. Lumbar MRI from 10/13/22 demonstrates multilevel degenerative changes. Etiology of pain is likely associated with multiple factors, including underlying MS, lumbar radiculopathy, possible neuropathy.     Visit Diagnoses:  1. Lumbar radiculopathy    2. MS (multiple sclerosis) (H)    3. Neuropathic pain        Plan:  Diagnosis reviewed, treatment option addressed, and risk/benifits discussed.  Self-care instructions given.  I am recommending a multidisciplinary treatment plan to help this patient better manage their pain.                  1.  Pain Physical Therapy:  NO   Continue activity as tolerated at home.               2.  Pain Psychologist to address relaxation, behavioral change, coping style, and other factors important to improvement.  NO              3.  Diagnostic Studies:  Reviewed lumbar MRI               4.  Medication Management:   2. Increase gabapentin and titrate to goal dose:   a. 600mg with breakfast  600mg early afternoon  600mg with dinner for 3 days, then increase to;  b. 600mg w/breakfast  600mg early afternoon  900mg w/dinner for 3 days, then increase to;  c. 900 mg w/ breakfast  600 mg early afternoon   900mg w/dinner, this is goal.   d. Care driving until you know how dosage increase affects your. Avoid drinking alcohol when you take the medication with dinner.               5.  Potential procedures:     1. We will discuss spinal cord stimulator at future visit.      2. He had L3-4 AIME with Dr. Camejo in the past, found helpful with right leg pain. He will contact the clinic if interested in repeat AIME in between visits.               6.  Referrals:              7.  Follow up with JUAN DIEGO Medeiros CNP in 8-12 weeks, after you return from [place]    Visit discussion: He is considering lumbar fusion surgery but is aware that recovery can be difficult and improvement in pain is not guaranteed.     Review of Electronic Chart: Today I have also reviewed available medical information in the patient's medical record at [facility] and Care Everywhere (if available), including relevant provider notes, laboratory work, and imaging.     Lucy Lazo DNP, JUAN DIEGO, AGNP-C  [facility]     -------------------------------------------------    Subjective:    Chief complaint:   Chief Complaint   Patient presents with     Follow Up     Follow-up Right leg, calf.Left thigh       Interval history:  Linda Grover is a 67 year old male last seen on 11/4/22.  They are a patient of mine seen in follow up.     Recommendations/plan at the last visit included:              1.  Pain Physical Therapy:  NO   Continue activity as tolerated at home.               2.  Pain Psychologist to address relaxation, behavioral change, coping style, and other factors important to improvement.  NO              3.  Diagnostic Studies:  Reviewed lumbar MRI               4.  Medication Management:   3. Increase gabapentin and titrate to goal dose:   a. 300 mg with breakfast  300 early afternoon  600 with dinner for 3 days, then increase to;  b. 600 mg w/breakfast  300 early afternoon  600 w/dinner. This is goal.     c. Care driving until you know how dosage increase affects your. Avoid drinking alcohol when you take the medication with dinner.               5.  Potential procedures: We will discuss spinal cord stimulator at future visit.                6.  Referrals:              7.  Follow up with JUAN DIEGO Medeiros CNP in 4-6 weeks before the week of Christmas. Video visit is okay.     Since his last visit, Linda Grover reports:  -His pain has improved since last visit.   -RLE pain was waking him up overnight, this has improved a lot.   -LLE is still problematic again, particularly with driving and sitting upright.   -Left leg pain increases when knee is bent at 90 degree angle.   -He denies side effects from gabapentin.   -He is interested in increased dosage to see if left leg symptoms improve.   -Dr. Louis is not in favor of changing baclofen dosage at this time.   -He is still considering lumbar fusion.   -Wife Gayathri present for this visit.   -Their son is living in [place] and on his way home for the holidays, was delayed due to snow storm in [facility].       HPI/Interval history from last visit on 11/4/22:  Linda Grover is a 67 year old male presents with a chief complaint of BLE radicular pain.      The pain has been present for 3 years .    The pain is Mild Pain (2) in severity.    The pain is described as burning, tingling, aching and dull.   The pain is alleviated by meds, rest.    It is exacerbated by lifting.    Things not tried - increased dose gabapentin  The patient otherwise denies bowel or bladder incontinence, parasthesias, weakness, saddle anesthesia, unintentional weight loss, or fever/chills/sweats.      Linda Grover has been seen at a pain clinic in the past.  Procedures with Dr. Camejo (see below).      -He has h/o MS.  -He previously saw neurologist, asked if symptoms could be related to MS.  -He has LESIs, initially found helpful but not lasting benefit.   -When driving a car and legs  are bent, leg symptoms worsen.   -Procedures done recently by Dr. Camejo:   10/20/22 with Nell   PROCEDURE(S) PERFORMED:  1.  Interlaminar right L3-L4 lumbar epidural steroid injection   9/1/22  PROCEDURE(S) PERFORMED:  1.  Interlaminar lumbar epidural steroid injection (L3/4)      -He states he is interested in possible SCS therapy.   -He has not had LSB in past.   -It seems he has not engaged in a lot of conservative therapy yet for symptoms.       Pain Information:   Pain rating: averages 2/10 on a 0-10 scale.      Current Pain Treatments:    Current Pain Treatments:     Medications:               Gabapentin 300 mg TID                Other therapies:               Injections - AIME with Dr. Camejo       Current MME: 0    Review of [place] Prescription Monitoring Program (): No concern for abuse or misuse of controlled medications based on this report. Reviewed - appears appropriate.     Annual Controlled Substance Agreement/UDS due date: N/A    Past pain treatments:  LESI      Medications:  Current Outpatient Medications   Medication Sig Dispense Refill     atorvastatin (LIPITOR) 40 MG tablet Take 40 mg by mouth At Bedtime       baclofen (LIORESAL) 20 MG tablet Take 1 tablet (20 mg) by mouth 3 times daily 270 tablet 3     cholecalciferol (VITAMIN D3) 5000 UNITS CAPS capsule Take 7,000 Units by mouth daily        gabapentin (NEURONTIN) 300 MG capsule Take 900 mg with breakfast   600 mg in early afternoon   900 mg with dinner. Titrate to goal dose per clinic instructions provided at appointment. 240 capsule 1     HydrOXYzine Pamoate (VISTARIL PO) Take 50 mg by mouth 3 times daily       lisinopril (PRINIVIL/ZESTRIL) 20 MG tablet Take 20 mg by mouth At Bedtime       Multiple Vitamins-Minerals (MULTIVITAMIN ADULT) TABS Take 1 tablet by mouth daily       omeprazole (PRILOSEC) 20 MG CR capsule Take 20 mg by mouth daily       traZODone (DESYREL) 50 MG tablet Take 1-2 tablets ( mg) by mouth At Bedtime 180 tablet  3       Medical History: any changes in medical history since they were last seen? No      Objective:    Physical Exam:  There were no vitals taken for this visit.  GENERAL: Healthy, alert and no distress  EYES: Eyes grossly normal to inspection.  No discharge or erythema, or obvious scleral/conjunctival abnormalities.  RESP: No audible wheeze, cough, or visible cyanosis.  No visible retractions or increased work of breathing.    SKIN: Visible skin clear. No significant rash, abnormal pigmentation or lesions.  NEURO: Cranial nerves grossly intact.  Mentation and speech appropriate for age.  PSYCH: Mentation appears normal, affect normal/bright, judgement and insight intact, normal speech and appearance well-groomed.    Imaging:  None     BILLING TIME DOCUMENTATION:   The total TIME spent on this patient on the date of the encounter/appointment was 37 minutes.      TOTAL TIME includes:   Time spent preparing to see the patient (reviewing records and tests)   Time spent face to face (or over the phone) with the patient   Time spent ordering tests, medications, procedures and referrals   Time spent Referring and communicating with other healthcare professionals   Time spent documenting clinical information in Epic

## 2023-01-11 ENCOUNTER — TELEPHONE (OUTPATIENT)
Dept: ANESTHESIOLOGY | Facility: CLINIC | Age: 68
End: 2023-01-11
Payer: COMMERCIAL

## 2023-01-11 NOTE — TELEPHONE ENCOUNTER
[facility] Call Center    Phone Message    May a detailed message be left on voicemail: yes     Reason for Call: Pt is requesting that Lucy Lazo place an order for him to have an injection.  Thanks.

## 2023-01-13 NOTE — TELEPHONE ENCOUNTER
RN called patient and left a voicemail. Patient requesting orders for procedure. Message routed to JUAN DIEGO Medeiros CNP, for review and case request if appropriate. Left call back number [phone] if patient has further questions.    Ariella Whitt RN

## 2023-01-17 ENCOUNTER — MYC MEDICAL ADVICE (OUTPATIENT)
Dept: ANESTHESIOLOGY | Facility: CLINIC | Age: 68
End: 2023-01-17
Payer: COMMERCIAL

## 2023-01-17 DIAGNOSIS — M54.16 LUMBAR RADICULOPATHY: Primary | ICD-10-CM

## 2023-01-17 NOTE — TELEPHONE ENCOUNTER
Patient is scheduled for procedure with Dr. Camejo    Spoke with: pt    Date of Procedure: 01-19-23    Location: [facility]    Informed patient they will need an adult  yes    Pre-procedure COVID-19 Test: N/A    Additional comments: Arrival @ 12 pm    Patient is aware pre-op RN will call 2-3 days prior to procedure with arrival time and instructions. yes      Rebecca Young on 1/17/2023 at 2:28 PM

## 2023-01-17 NOTE — TELEPHONE ENCOUNTER
Writer called patient and left a voicemail in separate telephone encounter. Pre-procedure instructions sent via [employer] as well. Patient has been scheduled for 1/19 with Dr. Camejo.      Ariella Whitt RN

## 2023-01-17 NOTE — TELEPHONE ENCOUNTER
RN called patient and left a voicemail. Writer calling to discuss pre-procedure instructions for Right Lumbar 2-3 and Lumbar 3-L4 interlaminar epidural steroid injection scheduled on 1/19 with Dr. Camejo. Writer sent instructions via [employer] ([place]) for review. Writer advised a call back to discuss any questions related to instructions or if patient takes any blood thinners. Left call back number on voicemail.     Ariella Whitt RN

## 2023-01-17 NOTE — CONFIDENTIAL NOTE
Chart reviewed - I place orders for repeat Right Lumbar 2-3 and Lumbar 3-L4 interlaminar epidural steroid injection with Dr. Camejo.     Lucy Lazo, DNP, APRN, AGNP-C  [facility]

## 2023-01-18 NOTE — TELEPHONE ENCOUNTER
RN called patient back with an update. Patient is okay to transport via [employer] (without an adult) for his procedure tomorrow with Dr. Camejo. ASC Supervisor was updated and approved. However, it was advised that for any future procedures, it's mandatory that patient has an adult accompany him if he chooses to take Uber/Taxi. Patient verbalized understanding with this policy and was appreciative. Patient also confirmed he is not taking any anticoagulants.       Ariella Whitt RN

## 2023-01-18 NOTE — TELEPHONE ENCOUNTER
RN called and spoke with patient. Patient reviewed pre-procedure instructions. Patient reports he will be taking an Uber to his procedure since he does not have an adult to drive him. Writer advised patient that if he takes Uber he must have an adult accompany him, per procedure policy. Patient reports he is unable to find an adult and wondering if Dr. Camejo will approve. Writer sent message to Dr. Camejo to advise on transportation. Patient will wait for a call back with further instruction.    Ariella Whitt RN

## 2023-01-19 ENCOUNTER — HOSPITAL ENCOUNTER (OUTPATIENT)
Facility: AMBULATORY SURGERY CENTER | Age: 68
Discharge: HOME | End: 2023-01-19
Attending: ANESTHESIOLOGY | Admitting: ANESTHESIOLOGY
Payer: COMMERCIAL

## 2023-01-19 ENCOUNTER — ANCILLARY ORDERS (OUTPATIENT)
Dept: ANESTHESIOLOGY | Facility: CLINIC | Age: 68
End: 2023-01-19

## 2023-01-19 ENCOUNTER — ANCILLARY PROCEDURE (OUTPATIENT)
Dept: RADIOLOGY | Facility: AMBULATORY SURGERY CENTER | Age: 68
End: 2023-01-19
Attending: ANESTHESIOLOGY
Payer: COMMERCIAL

## 2023-01-19 VITALS
SYSTOLIC BLOOD PRESSURE: 162 MMHG | OXYGEN SATURATION: 95 % | DIASTOLIC BLOOD PRESSURE: 71 MMHG | WEIGHT: 235 LBS | RESPIRATION RATE: 14 BRPM | BODY MASS INDEX: 33.64 KG/M2 | HEART RATE: 77 BPM | HEIGHT: 70 IN

## 2023-01-19 DIAGNOSIS — M54.16 LUMBAR RADICULOPATHY: ICD-10-CM

## 2023-01-19 PROCEDURE — 62323 NJX INTERLAMINAR LMBR/SAC: CPT | Mod: GC | Performed by: ANESTHESIOLOGY

## 2023-01-19 PROCEDURE — 62323 NJX INTERLAMINAR LMBR/SAC: CPT

## 2023-01-19 RX ORDER — BUPIVACAINE HYDROCHLORIDE 2.5 MG/ML
INJECTION, SOLUTION EPIDURAL; INFILTRATION; INTRACAUDAL DAILY PRN
Status: DISCONTINUED | OUTPATIENT
Start: 2023-01-19 | End: 2023-01-19 | Stop reason: HOSPADM

## 2023-01-19 RX ORDER — LIDOCAINE HYDROCHLORIDE 10 MG/ML
INJECTION, SOLUTION EPIDURAL; INFILTRATION; INTRACAUDAL; PERINEURAL DAILY PRN
Status: DISCONTINUED | OUTPATIENT
Start: 2023-01-19 | End: 2023-01-19 | Stop reason: HOSPADM

## 2023-01-19 RX ORDER — IOPAMIDOL 408 MG/ML
INJECTION, SOLUTION INTRATHECAL DAILY PRN
Status: DISCONTINUED | OUTPATIENT
Start: 2023-01-19 | End: 2023-01-19 | Stop reason: HOSPADM

## 2023-01-19 RX ORDER — METHYLPREDNISOLONE ACETATE 40 MG/ML
INJECTION, SUSPENSION INTRA-ARTICULAR; INTRALESIONAL; INTRAMUSCULAR; SOFT TISSUE DAILY PRN
Status: DISCONTINUED | OUTPATIENT
Start: 2023-01-19 | End: 2023-01-19 | Stop reason: HOSPADM

## 2023-01-19 NOTE — OP NOTE
Patient: Linda Grover Age: 67 year old   MRN: [id number] Attending: Dr. Camejo          PAIN MEDICINE CLINIC PROCEDURE NOTE     ATTENDING CLINICIAN:    Lisseth Camejo MD     ASSISTANT CLINICIAN:  Scarlet Saleh MD     PREPROCEDURE DIAGNOSES:  1.  Lumbar radiculopathy  2.  Chronic low back pain         PROCEDURE(S) PERFORMED:  1.  Interlaminar right L3-L4 lumbar epidural steroid injection   2.  Fluoroscopic guidance for the above-named procedure(s)        ANESTHESIA:  Local.     INDICATIONS:  Linda Grover is a 67 year old male with a history of  chronic low back pain secondary to bilateral lumbosacral radiculopathy (right greater than the left).  Patient's most recent MRI reviewed.  Patient has both L3 and S1 radiculopathy.  His symptoms on the right side restricted above-knee.  On the left side he said that sometimes his entire left lower extremity gives out.  EMG showed L3 radiculopathy.  Therefore decision is made for right L3-L4 epidural steroid injection.  I told him that S1 radiculopathy pain may not be covered with the injection.  And he may need to call us if he wants to perform epidural injection for S1 radiculopathy.  The patient stated that the patient was in their usual state of health and denied recent anticoagulant use or recent infections.  Therefore, the plan is to perform above mentioned procedures.      Procedure Details:  The patient was met in the procedure room, where the patient was identified by name, medical record number and date of birth.  All of the patient s last minute questions were answered. Written informed consent was obtained and saved in the electronic medical record, after the risks, benefits, and alternatives were discussed with the patient.       A formal time-out procedure was performed, as per protocol, including patient name, title of procedure, and site of procedure, and all in the room concurred.  Routine monitors were applied.       The patient was placed in the  prone position on the procedure room table.  All pressure points were checked and comfortably padded.  Routine monitors were placed.  Vital signs were stable.     A chlorhexidine prep was completed followed by sterile draping per standard procedure.      The AP fluoroscopic view was optimized for approach at right L3-L4 interspace.  The skin over the interspace was infiltrated with 4-5 mL of 1% Lidocaine using a 25 gauge, 1.5 inch needle.  A 20-gauge 3-1/2 inch Tuohy needle was advanced under fluoroscopic guidance with right paramedial approach until it touched lamina. Mutiple AP and lateral fluoroscopic images at this time  are taken as Tuohy needle was advanced to the epidural space. The epidural space was identified, without evidence of blood, cerebrospinal fluid, or parasthesia throughout. Needle tip placement within the epidural space was further confirmed with 1-2 mL of nonionic contrast agent, with the epidural space visualized in the AP and lateral fluoroscopic view(s) with appropriate spread of the agent with fat vacuolization and no intravascular or intrathecal spread noted. Next, 8 mL of a treatment solution containing 2 mL of preservative free 0.25% bupivacaine, 1 mL of Depo-Medrol 40 mg/mL and 5 mL preservative free normal saline was administered slowly. The needle was withdrawn.        Light pressure was held at the puncture site(s) to prevent ecchymosis and oozing.  The patient's skin was cleansed, and hemostasis was confirmed.  Band-aids were applied to the needle injection site(s).       Condition:     The patient remained awake and alert throughout the procedure.  The patient tolerated the procedure well and was monitored for approximately 15 minutes afterward in the post procedure area.  There were no immediate post procedure complications noted.  The patient was then discharged to home as per protocol.       Pre-procedure pain score: 4/10  Post-procedure pain score: 0/10

## 2023-01-19 NOTE — DISCHARGE INSTRUCTIONS
"Home Care Instructions after an Epidural Steroid Pain Injection    A lumbar epidural steroid injection delivers steroid medication directly into the area that may be causing your lower back pain and/or leg pain. A cervical or thoracic epidural steroid injection delivers steroids into the epidural space surrounding spinal nerve roots to help relieve pain in the upper spine/neck.    Activity  -Rest today  -Do not work today  -Resume normal activity tomorrow  -DO NOT shower for 24 hours  -DO NOT remove bandaid for 24 hours    Pain  -You may experience soreness at the injection site for one or two days  -You may use an ice pack for 20 minutes every 2 hours for the first 24 hours  -You may use a heating pad after the first 24 hours  -You may use Tylenol (acetaminophen) every 4 hours or other pain medicines as     directed by your physician    You may experience numbness radiating into your legs or arms (depending on the procedure location). This numbness may last several hours. Until sensation returns to normal; please use caution in walking, climbing stairs, and stepping out of your vehicle, etc.    DID YOU RECEIVE SEDATION TODAY?  {YES / NO:523292::\"Yes\"}    Safety  Sedation medicine, if given, may remain active for many hours. It is important for the next 24 hours that you do not:  -Drive a car  -Operate machines or power tools  -Consume alcohol, including beer  -Sign any important papers or legal documents      Common side effects of steroids:  Not everyone will experience corticosteroid side effects. If side effects are experienced, they will gradually subside in the 7-10 day period following an injection. Most common side effects include:  -Flushed face and/or chest  -Feeling of warmth, particularly in the face but could be an overall feeling of warmth  -Increased blood sugar in diabetic patients  -Menstrual irregularities my occur. If taking hormone-based birth control an alternate method of birth control is " recommended  -Sleep disturbances and/or mood swings are possible  -Leg cramps    Please contact us if you have:  -Severe pain  -Fever more than 101.5 degrees Fahrenheit  -Signs of infection at the injection site (redness, swelling, or drainage)    FOR PAIN CENTER PATIENTS:  If you have questions, please contact the Pain Clinic at [phone] Option #1 between the hours of 7:00 am and 3:00 pm Monday through Friday. After office hours you can contact the on call provider by dialing [phone]. If you need immediate attention, we recommend that you go to a hospital emergency room or dial [phone].

## 2023-03-31 ASSESSMENT — ENCOUNTER SYMPTOMS
MEMORY LOSS: 0
SPEECH CHANGE: 0
DISTURBANCES IN COORDINATION: 0
PARALYSIS: 0
TINGLING: 1
SEIZURES: 0
DIZZINESS: 0
HEADACHES: 0
NUMBNESS: 1
LOSS OF CONSCIOUSNESS: 0
TREMORS: 0
WEAKNESS: 0

## 2023-04-03 ENCOUNTER — OFFICE VISIT (OUTPATIENT)
Dept: ANESTHESIOLOGY | Facility: CLINIC | Age: 68
End: 2023-04-03
Payer: COMMERCIAL

## 2023-04-03 VITALS — HEART RATE: 62 BPM | OXYGEN SATURATION: 98 % | DIASTOLIC BLOOD PRESSURE: 78 MMHG | SYSTOLIC BLOOD PRESSURE: 124 MMHG

## 2023-04-03 DIAGNOSIS — M54.16 LUMBAR RADICULOPATHY: Primary | ICD-10-CM

## 2023-04-03 DIAGNOSIS — G35 MS (MULTIPLE SCLEROSIS) (H): ICD-10-CM

## 2023-04-03 DIAGNOSIS — M79.2 NEUROPATHIC PAIN: ICD-10-CM

## 2023-04-03 PROCEDURE — 99214 OFFICE O/P EST MOD 30 MIN: CPT

## 2023-04-03 RX ORDER — GABAPENTIN 300 MG/1
900 CAPSULE ORAL 3 TIMES DAILY
Qty: 270 CAPSULE | Refills: 1 | Status: SHIPPED | OUTPATIENT
Start: 2023-04-03 | End: 2023-08-30

## 2023-04-03 ASSESSMENT — PAIN SCALES - GENERAL: PAINLEVEL: MILD PAIN (2)

## 2023-04-03 NOTE — NURSING NOTE
RN read through the instructions with the patient for the recommended procedure: LESI  Patient verbalized understanding to holding appropriate medication per protocol and was agreeable to NPO policy and needing a .    Anticoagulant: None reported    Recommended Follow Up: 6-8 weeks after procedure    Ariella Whitt RN

## 2023-04-03 NOTE — NURSING NOTE
Patient presents with:  Follow Up: Routine, pain in both legs      Mild Pain (2)         What medications are you using for pain? Gabapentin, baclofen    (Return Patients only) What refills are you needing today? Possibly gabapentin    Khanh Randolph, EMT

## 2023-04-03 NOTE — LETTER
Date:April 4, 2023      Provider requested that no letter be sent. Do not send.       [facility]

## 2023-04-03 NOTE — PROGRESS NOTES
[facility]     Date of visit: 4/3/2023      Assessment:   Linda Grover is a 67 year old male with a past medical history significant for MS, HTN, lumbar radiculopathy, s/p right TKA who presents with complaints of BLE pain.      1. Leg pain - Symptoms have been present for 3 years, describes pain as mostly burning and tingling, sometimes aching and dull as well. He underwent L3-4 AIME x 2 with Dr. Camejo, 9/1/22 and 10/20/22, which he initially found helpful for pain, but notes short-term benefit for a few weeks. Lumbar MRI from 10/13/22 demonstrates multilevel degenerative changes. Etiology of pain is likely associated with multiple factors, including underlying MS, lumbar radiculopathy, possible neuropathy.     Visit Diagnoses:  1. Lumbar radiculopathy    2. MS (multiple sclerosis) (H)    3. Neuropathic pain        Plan:  Diagnosis reviewed, treatment option addressed, and risk/benifits discussed.  Self-care instructions given.  I am recommending a multidisciplinary treatment plan to help this patient better manage their pain.                  1.  Pain Physical Therapy:  NO   Continue activity as tolerated at home.               2.  Pain Psychologist to address relaxation, behavioral change, coping style, and other factors important to improvement.  NO              3.  Diagnostic Studies:  Reviewed lumbar MRI               4.  Medication Management:   a. Increase gabapentin to 900 mg TID. He has been taking 900-600-900 and does notice some increased pain the afternoon, sometimes does not remember to take at the same time. We may consider further increase versus alternative neuropathic, pending efficacy of repeat LESI.               5.  Potential procedures:                           1. We will discuss spinal cord stimulator at future visit. He has decided against pursuing lumbar fusion at this time.                           2. He had L3-4 AIME with Dr. Camejo in the past, found helpful  with right leg pain. Last done 1/19/23. He reports symptoms have started to return. Of note, he appreciates more than 50% improvement in symptoms that lasts on average for 3 months. He is interested in repeating LESI, orders placed to schedule with Dr. Camejo.               6.  Referrals:              7.  Follow up with JUAN DIEGO Medeiros CNP in 6-8 weeks after repeat LESI.     Review of Electronic Chart: Today I have also reviewed available medical information in the patient's medical record at [facility] ([facility]) and Care Everywhere (if available), including relevant provider notes, laboratory work, and imaging.     Lucy Lazo, VERNELL, JUAN DIEGO, AGNP-C  [facility]     -------------------------------------------------    Subjective:    Chief complaint:   Chief Complaint   Patient presents with     Follow Up     Routine, pain in both legs       Interval history:  Linda Grover is a 67 year old male last seen on 12/19/22.  They are a patient of mine seen in follow up.     Recommendations/plan at the last visit included:              1.  Pain Physical Therapy:  NO   Continue activity as tolerated at home.               2.  Pain Psychologist to address relaxation, behavioral change, coping style, and other factors important to improvement.  NO              3.  Diagnostic Studies:  Reviewed lumbar MRI               4.  Medication Management:   2. Increase gabapentin and titrate to goal dose:   a. 600mg with breakfast  600mg early afternoon  600mg with dinner for 3 days, then increase to;  b. 600mg w/breakfast  600mg early afternoon  900mg w/dinner for 3 days, then increase to;  c. 900 mg w/ breakfast  600 mg early afternoon  900mg w/dinner, this is goal.   d. Care driving until you know how dosage increase affects your. Avoid drinking alcohol when you take the medication with dinner.               5.  Potential procedures:                           1. We will discuss spinal cord stimulator at  future visit.                            2. He had L3-4 AIME with Dr. Camejo in the past, found helpful with right leg pain. He will contact the clinic if interested in repeat AIME in between visits.               6.  Referrals:              7.  Follow up with JUAN DIEGO Medeiros CNP in 8-12 weeks, after you return from [place]     Visit discussion: He is considering lumbar fusion surgery but is aware that recovery can be difficult and improvement in pain is not guaranteed.     Since his last visit, Linda Vasquezjosechina reports:  -his pain is about the same/perhaps slightly worse than was at last visit.   -He had repeat lumbar AIME with Dr. Camejo on 1/19/23 that was helpful with his BLE radicular pain.  -He recently saw spine surgeon through [facility], states provider does not think surgery is advisable.   -He is very active, especially when in [place].   -They returned to  in early March.   -He continues gabapentin 900-600-900 mg.   -No side effects from medications.   -After LESI, he will appreciate near resolution with BLE pain for 2-3 weeks, then 50%+ improvement for anywhere from 2-4 months.   -The AIME really improves left leg symptoms, sometimes continues to have some RLE pain that stops in calf.   -      Pain Information:   Pain rating: averages 2/10 on a 0-10 scale.      Interval history from last visit on 12/19/22:  -His pain has improved since last visit.   -RLE pain was waking him up overnight, this has improved a lot.   -LLE is still problematic again, particularly with driving and sitting upright.   -Left leg pain increases when knee is bent at 90 degree angle.   -He denies side effects from gabapentin.   -He is interested in increased dosage to see if left leg symptoms improve.   -Dr. Louis is not in favor of changing baclofen dosage at this time.   -He is still considering lumbar fusion.   -Wife Gayathri present for this visit.   -Their son is living in [place] and on his way home for the holidays, was delayed due  to snow storm in [facility].         HPI/Interval history from last visit on 11/4/22:  Linda Grover is a 67 year old male presents with a chief complaint of BLE radicular pain.      The pain has been present for 3 years .    The pain is Mild Pain (2) in severity.    The pain is described as burning, tingling, aching and dull.   The pain is alleviated by meds, rest.    It is exacerbated by lifting.    Things not tried - increased dose gabapentin  The patient otherwise denies bowel or bladder incontinence, parasthesias, weakness, saddle anesthesia, unintentional weight loss, or fever/chills/sweats.      Linda Grover has been seen at a pain clinic in the past.  Procedures with Dr. Camejo (see below).      -He has h/o MS.  -He previously saw neurologist, asked if symptoms could be related to MS.  -He has LESIs, initially found helpful but not lasting benefit.   -When driving a car and legs are bent, leg symptoms worsen.   -Procedures done recently by Dr. Camejo:   10/20/22 with Nell   PROCEDURE(S) PERFORMED:  1.  Interlaminar right L3-L4 lumbar epidural steroid injection   9/1/22  PROCEDURE(S) PERFORMED:  1.  Interlaminar lumbar epidural steroid injection (L3/4)      -He states he is interested in possible SCS therapy.   -He has not had LSB in past.   -It seems he has not engaged in a lot of conservative therapy yet for symptoms.         Current Pain Treatments:    Medications:               Gabapentin 300 mg TID                Other therapies:               Injections - AIME with Dr. Camejo         Current MME: 0     Review of [place] Prescription Monitoring Program (): No concern for abuse or misuse of controlled medications based on this report. Reviewed - appears appropriate.      Annual Controlled Substance Agreement/UDS due date: N/A     Past pain   LESI  - last on 1/19/23      Medications:  Current Outpatient Medications   Medication Sig Dispense Refill     atorvastatin (LIPITOR) 40 MG tablet Take 40  mg by mouth At Bedtime       baclofen (LIORESAL) 20 MG tablet Take 1 tablet (20 mg) by mouth 3 times daily 270 tablet 3     cholecalciferol (VITAMIN D3) 5000 UNITS CAPS capsule Take 7,000 Units by mouth daily        gabapentin (NEURONTIN) 300 MG capsule Take 3 capsules (900 mg) by mouth 3 times daily 270 capsule 1     HydrOXYzine Pamoate (VISTARIL PO) Take 50 mg by mouth 3 times daily       lisinopril (PRINIVIL/ZESTRIL) 20 MG tablet Take 20 mg by mouth At Bedtime       Multiple Vitamins-Minerals (MULTIVITAMIN ADULT) TABS Take 1 tablet by mouth daily       omeprazole (PRILOSEC) 20 MG CR capsule Take 20 mg by mouth daily       traZODone (DESYREL) 50 MG tablet Take 1-2 tablets ( mg) by mouth At Bedtime 180 tablet 3       Medical History: any changes in medical history since they were last seen? No      Objective:    Physical Exam:  Blood pressure 124/78, pulse 62, SpO2 98 %.  Constitutional: Well developed, well nourished, appears stated age.  Gait: Antalgic  HEENT: Head atraumatic, normocephalic. Eyes without conjunctival injection or jaundice. Oropharynx clear. Neck supple. No obvious neck masses.  Skin: No rash, lesions, or petechiae of exposed skin.   Psychiatric/mental status: Alert, without lethargy or stupor. Speech fluent. Appropriate affect. Mood normal. Able to follow commands without difficulty.     Diagnostic Tests/Imaging/Labs:  MR LUMBAR SPINE W/O CONTRAST 10/13/2022 5:29 PM     Provided History: Low back pain; Neurologic deficit, non-traumatic; LE  numbness/paresthesia; Increasing/persistent LE numbness/paresthesia;  No known/automatically detected potential contraindications to  imaging; Lumbar radiculopathy     ICD-10: Lumbar radiculopathy     Comparison: 4/1/2021     Technique: Sagittal T1-weighted, sagittal STIR, sagittal  diffusion-weighted (with ADC map), axial T1-weighted, and 3D  volumetric axial and sagittal reconstructed T2-weighted images of the  lumbar spine were obtained without  intravenous contrast.      Findings: There are 5 lumbar-type vertebrae, counting from C2.  The  tip of the conus medullaris is at L1.  Straightening of the normal  lordotic curvature. AP alignment is well maintained.  There is  multilevel mild to moderate disc height narrowing throughout the  lumbar spine with severe narrowing at L5-S1.  Normal marrow signal.     On a level by level basis:     T12-L1: No spinal canal or neuroforaminal stenosis.     L1-2: No spinal canal or neuroforaminal stenosis.     L2-3: Small posterior disc bulge. Small superimposed protrusion the  left subarticular zone. Mild bilateral neural foraminal stenosis. No  spinal canal stenosis.     L3-4: Disc bulge and right foraminal disc protrusion. Moderate  stenosis of the right neural foramen with the disc abutting the  inferior aspect of the exiting right L3. Mild left neural foraminal  narrowing. No significant spinal canal stenosis. Findings are similar  to prior.     L4-5: Tiny posterior disc bulge. Bilateral facet arthropathy. Small  superimposed central protrusion. Mild left and mild-to-moderate right  neural foraminal stenosis. No significant spinal canal stenosis.     L5-S1: Posterior disc bulge asymmetric to the right subarticular zone.  This may also be a small superimposed protrusion. There is abutment  and slight displacement of the descending right S1 nerve roots.  Bilateral facet arthropathy. Mild bilateral neural foraminal stenosis.  No significant spinal canal stenosis.     Paraspinous tissues are within normal limits.                                                                      Impression: Multilevel lumbar spondylosis detailed above which is  similar to 4/1/2021. Small right foraminal disc protrusion at L3-4  with mild abutment of the inferior aspect of the right exiting L3. In  addition, there is small right subarticular disc protrusion at L5-S1  with mild abutment and posterior displacement of right descending  S1  nerve. Recommend clinical correlation for right S1 and L3  radiculopathy.    BILLING TIME DOCUMENTATION:   The total TIME spent on this patient on the date of the encounter/appointment was 33 minutes.      TOTAL TIME includes:   Time spent preparing to see the patient (reviewing records and tests)   Time spent face to face (or over the phone) with the patient   Time spent ordering tests, medications, procedures and referrals   Time spent Referring and communicating with other healthcare professionals   Time spent documenting clinical information in Epic

## 2023-04-03 NOTE — PATIENT INSTRUCTIONS
Medications:      Gabapentin - increase afternoon dose to 900 mg. You will be at 900 mg in am, 900 mg in pm, 900 mg at bedtime.    *Please provide the clinic with a minium of 1 week notice, on all prescription refills.         Procedures:    Call to schedule your procedure: [phone] option #2    Lumbar Epidural     Your pre-procedure instructions are below, please call our clinic if you have any questions.        Recommended Follow up:      Follow up 6-8 weeks after procedure.         Please call [phone] to schedule your clinic appointment if you don't already have an appointment scheduled.    Procedure Information related to COVID-19     Please call [phone] option #2 to schedule, reschedule, or cancel your procedure appointment.   Phones are answered Monday - Friday from 08:00 - 4:30pm.  Leave a voicemail with your name, birth date, and phone number if no one is available to take your call.        You no longer need to test for COVID- 19 prior to your procedure/surgery, unless your physician specifically requests that you test. If you experience COVID symptoms or have tested positive for COVID-19 within 14 days of your scheduled surgery or procedure, please update our office right away and your procedure may have to be postponed.       The procedure center staff will call you several days before the procedure to review important information that you will need to know for the day of the procedure.     Please contact the clinic if you have further questions about this information [phone].        Information related to Scheduling and Pre-Procedure Instructions:    If you must reschedule your procedure more than two times, you must follow up in clinic before rescheduling again.    Preparing for your procedure    CAUTION - FAILURE TO FOLLOW THESE PRE-PROCEDURE INSTRUCTIONS WILL RESULT IN YOUR PROCEDURE BEING RESCHEDULED.    Your Procedure: Lumbar Epidural Steroid Injection          You must have  a  take you home after your procedure. Transportation by taxi or para-transit is okay as long as you have a responsible adult accompany you. You must provide your 's full name and contact number at time of check in.     Fasting Protocol Please have nothing to eat or drink 1 hour prior to arrival.   Medications If you take any medications, DO NOT STOP. Take your medications as usual the day of your procedure with a sip of water AT LEAST 2 HOURS PRIOR TO ARRIVAL.    Antibiotics If you are currently taking antibiotics, you must complete the entire dose 7 days prior to your scheduled procedure. You must be clear of any signs or symptoms of infection. If you begin antibiotics, please contact our clinic for instructions.     Fever, Chills, or Rash If you experience a fever of higher than 100 degrees, chills, rash, or open wounds during the one week before your procedure, please call the clinic to see if you may proceed with your procedure.      Medication Hold List  **Patients under Cardiology/Neurology care should consult their provider prior to the pain procedure to verify pre-procedure medication instructions. The information below contains general guidelines.**    Blood Thinners If you are taking daily ASPIRIN, PLAVIX, OR OTHER BLOOD THINNERS SUCH AS COUMADIN/WARFARIN, we will need your prescribing doctor to sign a release permitting you to stop these medications. Once approved by your prescribing doctor - STOP ALL BLOOD THINNERS BASED ON THE TIME TABLE BELOW PRIOR TO YOUR PROCEDURE. If you have been instructed to stop WARFARIN(COUMADIN), you must have an INR lab drawn the day before your procedure. . Your INR must be within normal limits before we can perform your injection. MEDICATIONS CAN BE RESTARTED AFTER YOUR PROCEDURE.    14 DAY HOLD  Ticlid (ticlopidine)    10 DAY HOLD  Effient (Prasugel)    3 DAY HOLD  Xarelto (rivaroxaban) 7 DAY HOLD  Anacin, Bufferin, Ecotrin, Excedrin, Aggrenox  (Aspirin)  Brilinta (ticagrelor)  Coumadin (Warfarin)  Pradexa (Dabigatran)  Elmiron (Pentosan)  Plavix (Clopidogrel Bisulfate)  Pletal (Cilostazol)    24 HOUR HOLD  Lovenox (enoxaparin)  Agrylin (Anagrelide)        Non-steroidal Anti-inflammatories (NSAIDs) DO NOT TAKE any non-steroidal anti-inflammatory medications (NSAIDs) listed on the table below. MEDICATIONS CAN BE RESTARTED AFTER YOUR PROCEDURE. Celebrex is OK to take and does not need to be discontinued.     Medications to stop:  3 DAY HOLD  Advil, Motrin (Ibuprofen)  Arthrotec (diciofenac sodium/misoprostol)  Clinoril (Sulindac)  Indocin (Indomethacin)  Lodine (Etodolac)  Toradol (Ketorolac)  Vicoprofen (Hydrocodone and Ibuprofen)  Voltaren (Diclofenac)  Apixaban (Eliquis)    14 DAY HOLD  Daypro (Oxaprozin)  Feldene (Piroxicam)   7 DAY HOLD  Aleve (Naproxen sodium)  Darvon compound (contains aspirin)  Naprosyn (Naproxen)  Norgesic Forte (contains aspirin)  Mobic (Meloxicam)  Oruvall (Ketoprofen)  Percodan (contains aspirin)  Relafen (Nabumetone)  Salsalate  Trilisate  Vitamin E (more than 400 mg per day)  Any medication containing aspirin                To speak with a nurse, schedule/reschedule/cancel a clinic appointment, or request a medication refill call: [phone]    You can also reach us by [employer]: [email or web address]

## 2023-04-04 ENCOUNTER — TELEPHONE (OUTPATIENT)
Dept: ANESTHESIOLOGY | Facility: CLINIC | Age: 68
End: 2023-04-04
Payer: COMMERCIAL

## 2023-04-04 NOTE — TELEPHONE ENCOUNTER
Patient is scheduled with Dr. Camejo   Spoke with: the patient   Date of Procedure: 04/20   Location: [facility]   Informed patient they will need an adult : yes   Additional comments: n/a    Patient is aware pre-op RN will call 2-3 days prior to procedure with arrival time and instructions     Jarocho Emmanuel on 4/4/2023 at 8:25 AM

## 2023-04-20 ENCOUNTER — ANCILLARY PROCEDURE (OUTPATIENT)
Dept: RADIOLOGY | Facility: AMBULATORY SURGERY CENTER | Age: 68
End: 2023-04-20
Attending: ANESTHESIOLOGY
Payer: COMMERCIAL

## 2023-04-20 ENCOUNTER — ANCILLARY ORDERS (OUTPATIENT)
Dept: ANESTHESIOLOGY | Facility: CLINIC | Age: 68
End: 2023-04-20

## 2023-04-20 ENCOUNTER — HOSPITAL ENCOUNTER (OUTPATIENT)
Facility: AMBULATORY SURGERY CENTER | Age: 68
Discharge: HOME | End: 2023-04-20
Attending: ANESTHESIOLOGY | Admitting: ANESTHESIOLOGY
Payer: COMMERCIAL

## 2023-04-20 VITALS
SYSTOLIC BLOOD PRESSURE: 146 MMHG | RESPIRATION RATE: 16 BRPM | OXYGEN SATURATION: 96 % | DIASTOLIC BLOOD PRESSURE: 77 MMHG | HEART RATE: 52 BPM

## 2023-04-20 DIAGNOSIS — R52 PAIN: ICD-10-CM

## 2023-04-20 PROCEDURE — 62323 NJX INTERLAMINAR LMBR/SAC: CPT

## 2023-04-20 PROCEDURE — 62323 NJX INTERLAMINAR LMBR/SAC: CPT | Mod: GC | Performed by: ANESTHESIOLOGY

## 2023-04-20 RX ORDER — METHYLPREDNISOLONE ACETATE 40 MG/ML
INJECTION, SUSPENSION INTRA-ARTICULAR; INTRALESIONAL; INTRAMUSCULAR; SOFT TISSUE DAILY PRN
Status: DISCONTINUED | OUTPATIENT
Start: 2023-04-20 | End: 2023-04-20 | Stop reason: HOSPADM

## 2023-04-20 RX ORDER — IOPAMIDOL 408 MG/ML
INJECTION, SOLUTION INTRATHECAL DAILY PRN
Status: DISCONTINUED | OUTPATIENT
Start: 2023-04-20 | End: 2023-04-20 | Stop reason: HOSPADM

## 2023-04-20 RX ORDER — BUPIVACAINE HYDROCHLORIDE 2.5 MG/ML
INJECTION, SOLUTION EPIDURAL; INFILTRATION; INTRACAUDAL DAILY PRN
Status: DISCONTINUED | OUTPATIENT
Start: 2023-04-20 | End: 2023-04-20 | Stop reason: HOSPADM

## 2023-04-20 RX ORDER — LIDOCAINE HYDROCHLORIDE 10 MG/ML
INJECTION, SOLUTION EPIDURAL; INFILTRATION; INTRACAUDAL; PERINEURAL DAILY PRN
Status: DISCONTINUED | OUTPATIENT
Start: 2023-04-20 | End: 2023-04-20 | Stop reason: HOSPADM

## 2023-04-20 NOTE — OP NOTE
Patient: Linda Grover Age: 67 year old   MRN: [id number] Attending: Dr. Camejo          PAIN MEDICINE CLINIC PROCEDURE NOTE     ATTENDING CLINICIAN:    Lisseth Camejo MD     ASSISTANT CLINICIAN:  Bc Dalal MD     PREPROCEDURE DIAGNOSES:  1.  Lumbar radiculopathy  2.  Chronic low back pain         PROCEDURE(S) PERFORMED:  1.  Interlaminar right L3-L4 lumbar epidural steroid injection   2.  Fluoroscopic guidance for the above-named procedure(s)        ANESTHESIA:  Local.     INDICATIONS:  Linda Grover is a 67 year old male with a history of  chronic low back pain secondary to bilateral lumbosacral radiculopathy (right greater than the left).  Patient's most recent MRI reviewed.  Patient has both L3 and S1 radiculopathy.  His symptoms on the right side restricted above-knee.  On the left side he said that sometimes his entire left lower extremity gives out.  EMG showed L3 radiculopathy.  Therefore decision is made for right L3-L4 epidural steroid injection. He had good pain relief with prior injections.  The patient stated that the patient was in their usual state of health and denied recent anticoagulant use or recent infections.  Therefore, the plan is to perform above mentioned procedures.      Procedure Details:  The patient was met in the procedure room, where the patient was identified by name, medical record number and date of birth.  All of the patient s last minute questions were answered. Written informed consent was obtained and saved in the electronic medical record, after the risks, benefits, and alternatives were discussed with the patient.       A formal time-out procedure was performed, as per protocol, including patient name, title of procedure, and site of procedure, and all in the room concurred.  Routine monitors were applied.       The patient was placed in the prone position on the procedure room table.  All pressure points were checked and comfortably padded.  Routine monitors were  placed.  Vital signs were stable.     A chlorhexidine prep was completed followed by sterile draping per standard procedure.      The AP fluoroscopic view was optimized for approach at right L3-L4 interspace.  The skin over the interspace was infiltrated with 4-5 mL of 1% Lidocaine using a 25 gauge, 1.5 inch needle.  A 20-gauge 3-1/2 inch Tuohy needle was advanced under fluoroscopic guidance with right paramedial approach until it touched lamina. Mutiple AP and lateral fluoroscopic images at this time  are taken as Tuohy needle was advanced to the epidural space. The epidural space was identified, without evidence of blood, cerebrospinal fluid, or parasthesia throughout. Needle tip placement within the epidural space was further confirmed with 1-2 mL of nonionic contrast agent, with the epidural space visualized in the AP and lateral fluoroscopic view(s) with appropriate spread of the agent with fat vacuolization and no intravascular or intrathecal spread noted. Next, 8 mL of a treatment solution containing 2 mL of preservative free 0.25% bupivacaine, 1 mL of Depo-Medrol 40 mg/mL and 5 mL preservative free normal saline was administered slowly. The needle was withdrawn.        Light pressure was held at the puncture site(s) to prevent ecchymosis and oozing.  The patient's skin was cleansed, and hemostasis was confirmed.  Band-aids were applied to the needle injection site(s).       Condition:     The patient remained awake and alert throughout the procedure.  The patient tolerated the procedure well and was monitored for approximately 15 minutes afterward in the post procedure area.  There were no immediate post procedure complications noted.  The patient was then discharged to home as per protocol.       Pre-procedure pain score: 3/10  Post-procedure pain score: 0/10

## 2023-04-20 NOTE — DISCHARGE INSTRUCTIONS
Home Care Instructions after an Epidural Steroid Pain Injection    A lumbar epidural steroid injection delivers steroid medication directly into the area that may be causing your lower back pain and/or leg pain. A cervical or thoracic epidural steroid injection delivers steroids into the epidural space surrounding spinal nerve roots to help relieve pain in the upper spine/neck.    Activity  -Rest today  -Do not work today  -Resume normal activity tomorrow  -DO NOT shower for 24 hours  -DO NOT remove bandaid for 24 hours    Pain  -You may experience soreness at the injection site for one or two days  -You may use an ice pack for 20 minutes every 2 hours for the first 24 hours  -You may use a heating pad after the first 24 hours  -You may use Tylenol (acetaminophen) every 4 hours or other pain medicines as     directed by your physician    You may experience numbness radiating into your legs or arms (depending on the procedure location). This numbness may last several hours. Until sensation returns to normal; please use caution in walking, climbing stairs, and stepping out of your vehicle, etc.      Common side effects of steroids:  Not everyone will experience corticosteroid side effects. If side effects are experienced, they will gradually subside in the 7-10 day period following an injection. Most common side effects include:  -Flushed face and/or chest  -Feeling of warmth, particularly in the face but could be an overall feeling of warmth  -Increased blood sugar in diabetic patients  -Menstrual irregularities my occur. If taking hormone-based birth control an alternate method of birth control is recommended  -Sleep disturbances and/or mood swings are possible  -Leg cramps    Please contact us if you have:  -Severe pain  -Fever more than 101.5 degrees Fahrenheit  -Signs of infection at the injection site (redness, swelling, or drainage)    FOR PAIN CENTER PATIENTS:  If you have questions, please contact the Pain  Clinic at [phone] Option #1 between the hours of 7:00 am and 3:00 pm Monday through Friday. After office hours you can contact the on call provider by dialing [phone]. If you need immediate attention, we recommend that you go to a hospital emergency room or dial [phone].

## 2023-06-20 ENCOUNTER — ANCILLARY PROCEDURE (OUTPATIENT)
Dept: MRI IMAGING | Facility: CLINIC | Age: 68
End: 2023-06-20
Attending: PSYCHIATRY & NEUROLOGY
Payer: COMMERCIAL

## 2023-06-20 DIAGNOSIS — G35 MS (MULTIPLE SCLEROSIS) (H): ICD-10-CM

## 2023-06-20 PROCEDURE — 72157 MRI CHEST SPINE W/O & W/DYE: CPT | Mod: GC | Performed by: RADIOLOGY

## 2023-06-20 PROCEDURE — A9585 GADOBUTROL INJECTION: HCPCS | Performed by: RADIOLOGY

## 2023-06-20 PROCEDURE — 72156 MRI NECK SPINE W/O & W/DYE: CPT | Mod: GC | Performed by: STUDENT IN AN ORGANIZED HEALTH CARE EDUCATION/TRAINING PROGRAM

## 2023-06-20 PROCEDURE — 70553 MRI BRAIN STEM W/O & W/DYE: CPT | Mod: GC | Performed by: RADIOLOGY

## 2023-06-20 RX ORDER — GADOBUTROL 604.72 MG/ML
10 INJECTION INTRAVENOUS ONCE
Status: COMPLETED | OUTPATIENT
Start: 2023-06-20 | End: 2023-06-20

## 2023-06-20 RX ADMIN — GADOBUTROL 10 ML: 604.72 INJECTION INTRAVENOUS at 12:30

## 2023-06-28 ENCOUNTER — OFFICE VISIT (OUTPATIENT)
Dept: NEUROLOGY | Facility: CLINIC | Age: 68
End: 2023-06-28
Attending: PSYCHIATRY & NEUROLOGY
Payer: COMMERCIAL

## 2023-06-28 VITALS
OXYGEN SATURATION: 96 % | HEART RATE: 58 BPM | WEIGHT: 248.2 LBS | SYSTOLIC BLOOD PRESSURE: 122 MMHG | DIASTOLIC BLOOD PRESSURE: 73 MMHG | BODY MASS INDEX: 35.61 KG/M2

## 2023-06-28 DIAGNOSIS — M79.2 NEUROPATHIC PAIN: ICD-10-CM

## 2023-06-28 DIAGNOSIS — M54.16 LUMBAR RADICULOPATHY: ICD-10-CM

## 2023-06-28 DIAGNOSIS — R25.2 SPASTICITY: ICD-10-CM

## 2023-06-28 DIAGNOSIS — G35 MS (MULTIPLE SCLEROSIS) (H): Primary | ICD-10-CM

## 2023-06-28 PROCEDURE — 99214 OFFICE O/P EST MOD 30 MIN: CPT | Performed by: PSYCHIATRY & NEUROLOGY

## 2023-06-28 PROCEDURE — G0463 HOSPITAL OUTPT CLINIC VISIT: HCPCS | Performed by: PSYCHIATRY & NEUROLOGY

## 2023-06-28 ASSESSMENT — PAIN SCALES - GENERAL: PAINLEVEL: NO PAIN (0)

## 2023-06-28 NOTE — LETTER
6/28/2023       RE: Linda Grover  [id number]  [place] [address]     Dear Colleague,    Thank you for referring your patient, Linda Grover, to the [facility] MULTIPLE SCLEROSIS CLINIC [place] [facility]. Please see a copy of my visit note below.    Date of Service: 6/28/2023    [facility] Neurology   MS Clinic Follow-up     Subjective: 67-year-old man with a history of hypertension, prostate cancer status post excision in October 2020, psoriasis, and multiple sclerosis who presents for evaluation.    He is accompanied by his wife, Gayathri, but provides history independently.    No new symptoms    He continues to struggle with back pain.  He also has pain in the left knee.  Was seen by an orthopedic surgeon at [facility].  Surgery was not advised for the lower back.  Physical therapy was advised.    He continues to have some sensitivity to cold in his hands.  It can also be quite painful to lay on his one side with his hand clenched.    He has stiffness in his legs that happens when walking or when standing for too long.  He also have burning in the legs with standing for prolonged period of time.  He will occasionally catch his toe when walking on uneven surfaces.  He has to be mindful of where he stops.  He has not does not report any significant falls.    He continues to take gabapentin, but is prescribed 900 mg 3 times per day.  He is cautious about the midday dose because if he takes a full dose at once he will experience more grogginess.  He continues to take baclofen as well.    Disease onset: age 55, paresthesias in UE with hand weakness, was on enbrel for psoriasis  Last relapse: unk    DMD hx:   Copaxone 2010 - 2016, d/c progression of disease though not clear what clinical symptoms were present  tysabri 2016 - 2017, d/c JCV seroconversion   lemtrada 2017 & 11/2018, now undergoing post treatment monitoring       No Known  Allergies    Current Outpatient Medications   Medication    atorvastatin (LIPITOR) 40 MG tablet    baclofen (LIORESAL) 20 MG tablet    cholecalciferol (VITAMIN D3) 5000 UNITS CAPS capsule    gabapentin (NEURONTIN) 300 MG capsule    HydrOXYzine Pamoate (VISTARIL PO)    lisinopril (PRINIVIL/ZESTRIL) 20 MG tablet    Multiple Vitamins-Minerals (MULTIVITAMIN ADULT) TABS    omeprazole (PRILOSEC) 20 MG CR capsule    traZODone (DESYREL) 50 MG tablet     No current facility-administered medications for this visit.        Past medical, surgical, social and family history was personally reviewed. Pertinent details noted above.     Physical Examination:   /73 (BP Location: Right arm, Patient Position: Sitting, Cuff Size: Adult Regular)   Pulse 58   Wt 112.6 kg (248 lb 3.2 oz)   SpO2 96%   BMI 35.61 kg/m      General: no acute distress  Cranial nerves:   VFFC  PERRL w/no RAPD  EOM full w/no CHARLIE   Face symmetric  Hearing intact  No dysarthria   Motor:   Tone is mildly increased in the LE  Bulk is normal     R L  Deltoid  5 5  Biceps  5 5  Triceps 5 5  Wrist ext 5 5  Finger ext 5 5  Finger abd 5 5    Hip flexion 5 5  Knee flexion 5 5  Knee ext 5 5  Ankle d/f 5 5    Reflexes: absent in the right patella, normal on the left, babinski present on the left  Sensory: vibration is mildly reduced in the ankles  Romberg is absent  Coordination: no ataxia or dysmetria  Gait: Gait is mildly antalgic, tandem gait is intact, difficulty standing one foot for more than a few seconds    Tests/Imaging:   CSF >5 ocb, IgG ind 1.07    MAGDALENA virus Ab reported positive  Vitamin D 70    MRI brain   2015 - few periventricular lesions, deep white matter ovoid lesion in the left periatrial region, multiple non specific punctate white matter lesions, gd-   4/2021 - no new lesions  6/2023 - no new lesions    MRI cervical spine   2015 - dorsal cord lesion at c1, gd-   4/2021 - no new lesions  6/2023 - no new lesions    MRI thoracic spine   2015 -  negative for ms lesions, gd-   4/2021 - no new lesions  6/2023 - no new lesions    Assessment: 67 year old man with multiple sclerosis who is s/p lemtrada and philipp radiologically and clinically stable.  We discussed today how additional disease modifying therapy is not likely at this point.  I would recommend repeating MRI in 2025.  If that is stable he would be done with radiologic surveillance.    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  He will work with orthopedics to address this.    For his neuropathic pain he will continue with gabapentin.  I recommended that he try taking 2 in the morning, too late morning, 2 in the afternoon and 3 at bedtime rather than taking 3 3 times a day.  Continue to take baclofen 20 mg 3 times per day.    We discussed the importance of fall prevention and weight loss.    Plan:     - baclofen 20 mg tid   -Gabapentin as per above  - Follow-up in 1 year    Note was completed with the assistance of Dragon Fluency software which can often result in accidental word substitutions.     A total of 30 minutes on the date of service were spent in the care of this patient.   Farzana Louis MD on 6/28/2023 at 10:50 AM              Again, thank you for allowing me to participate in the care of your patient.      Sincerely,    Farzana Louis MD

## 2023-06-28 NOTE — NURSING NOTE
Chief Complaint   Patient presents with     MS     RECHECK     Ms follow up      Vitals were taken and medications were reconciled.   You Bernardo, EMT  10:22 AM

## 2023-06-28 NOTE — PROGRESS NOTES
Date of Service: 6/28/2023    [facility] Neurology   MS Clinic Follow-up     Subjective: 67-year-old man with a history of hypertension, prostate cancer status post excision in October 2020, psoriasis, and multiple sclerosis who presents for evaluation.    He is accompanied by his wife, Gayathri, but provides history independently.    No new symptoms    He continues to struggle with back pain.  He also has pain in the left knee.  Was seen by an orthopedic surgeon at [facility].  Surgery was not advised for the lower back.  Physical therapy was advised.    He continues to have some sensitivity to cold in his hands.  It can also be quite painful to lay on his one side with his hand clenched.    He has stiffness in his legs that happens when walking or when standing for too long.  He also have burning in the legs with standing for prolonged period of time.  He will occasionally catch his toe when walking on uneven surfaces.  He has to be mindful of where he stops.  He has not does not report any significant falls.    He continues to take gabapentin, but is prescribed 900 mg 3 times per day.  He is cautious about the midday dose because if he takes a full dose at once he will experience more grogginess.  He continues to take baclofen as well.    Disease onset: age 55, paresthesias in UE with hand weakness, was on enbrel for psoriasis  Last relapse: unk    DMD hx:   Copaxone 2010 - 2016, d/c progression of disease though not clear what clinical symptoms were present  tysabri 2016 - 2017, d/c JCV seroconversion   lemtrada 2017 & 11/2018, now undergoing post treatment monitoring       No Known Allergies    Current Outpatient Medications   Medication     atorvastatin (LIPITOR) 40 MG tablet     baclofen (LIORESAL) 20 MG tablet     cholecalciferol (VITAMIN D3) 5000 UNITS CAPS capsule     gabapentin (NEURONTIN) 300 MG capsule     HydrOXYzine Pamoate (VISTARIL PO)     lisinopril (PRINIVIL/ZESTRIL) 20 MG tablet     Multiple  Vitamins-Minerals (MULTIVITAMIN ADULT) TABS     omeprazole (PRILOSEC) 20 MG CR capsule     traZODone (DESYREL) 50 MG tablet     No current facility-administered medications for this visit.        Past medical, surgical, social and family history was personally reviewed. Pertinent details noted above.     Physical Examination:   /73 (BP Location: Right arm, Patient Position: Sitting, Cuff Size: Adult Regular)   Pulse 58   Wt 112.6 kg (248 lb 3.2 oz)   SpO2 96%   BMI 35.61 kg/m      General: no acute distress  Cranial nerves:   VFFC  PERRL w/no RAPD  EOM full w/no CHARLIE   Face symmetric  Hearing intact  No dysarthria   Motor:   Tone is mildly increased in the LE  Bulk is normal     R L  Deltoid  5 5  Biceps  5 5  Triceps 5 5  Wrist ext 5 5  Finger ext 5 5  Finger abd 5 5    Hip flexion 5 5  Knee flexion 5 5  Knee ext 5 5  Ankle d/f 5 5    Reflexes: absent in the right patella, normal on the left, babinski present on the left  Sensory: vibration is mildly reduced in the ankles  Romberg is absent  Coordination: no ataxia or dysmetria  Gait: Gait is mildly antalgic, tandem gait is intact, difficulty standing one foot for more than a few seconds    Tests/Imaging:   CSF >5 ocb, IgG ind 1.07    MAGDALENA virus Ab reported positive  Vitamin D 70    MRI brain   2015 - few periventricular lesions, deep white matter ovoid lesion in the left periatrial region, multiple non specific punctate white matter lesions, gd-   4/2021 - no new lesions  6/2023 - no new lesions    MRI cervical spine   2015 - dorsal cord lesion at c1, gd-   4/2021 - no new lesions  6/2023 - no new lesions    MRI thoracic spine   2015 - negative for ms lesions, gd-   4/2021 - no new lesions  6/2023 - no new lesions    Assessment: 67 year old man with multiple sclerosis who is s/p lemtrada and philipp radiologically and clinically stable.  We discussed today how additional disease modifying therapy is not likely at this point.  I would recommend repeating MRI  in 2025.  If that is stable he would be done with radiologic surveillance.    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  He will work with orthopedics to address this.    For his neuropathic pain he will continue with gabapentin.  I recommended that he try taking 2 in the morning, too late morning, 2 in the afternoon and 3 at bedtime rather than taking 3 3 times a day.  Continue to take baclofen 20 mg 3 times per day.    We discussed the importance of fall prevention and weight loss.    Plan:     - baclofen 20 mg tid   -Gabapentin as per above  - Follow-up in 1 year    Note was completed with the assistance of Dragon Fluency software which can often result in accidental word substitutions.     A total of 30 minutes on the date of service were spent in the care of this patient.   Farzana Louis MD on 6/28/2023 at 10:50 AM

## 2023-06-28 NOTE — PATIENT INSTRUCTIONS
Your MRI was stable   No need for an MS medication at this point     Stay active - but take caution to prevent falls   Stretch your legs out for 10-15 minutes twice per day   See primary care or ortho for your left knee (pain could be lower back and your knee)     Gabapentin take 2 in the morning, 2 late morning, 2 afternoon, 3 at bedtime  Baclofen 1 with morning dose, 1 with afternoon dose, and 1 at bedtime    Follow up in 1 year

## 2023-07-15 ENCOUNTER — HEALTH MAINTENANCE LETTER (OUTPATIENT)
Age: 68
End: 2023-07-15

## 2023-08-17 DIAGNOSIS — F51.01 PRIMARY INSOMNIA: ICD-10-CM

## 2023-08-18 RX ORDER — TRAZODONE HYDROCHLORIDE 50 MG/1
50-100 TABLET, FILM COATED ORAL AT BEDTIME
Qty: 180 TABLET | Refills: 3 | Status: SHIPPED | OUTPATIENT
Start: 2023-08-18 | End: 2024-07-01

## 2023-08-18 NOTE — TELEPHONE ENCOUNTER
Received refill request for trazodone from [facility]; Patient was last seen in June 2023 and has follow up appointment in June 2024 with Dr Louis. Refilled per MS refill protocol.    Felisa Daly RN

## 2023-08-29 DIAGNOSIS — G35 MS (MULTIPLE SCLEROSIS) (H): ICD-10-CM

## 2023-08-29 DIAGNOSIS — M79.2 NEUROPATHIC PAIN: ICD-10-CM

## 2023-08-29 NOTE — TELEPHONE ENCOUNTER
Refill request    Medication: gabapentin (NEURONTIN) 300 MG capsule     Sig: Take 3 capsules (900 mg) by mouth 3 times daily     Dispensed: 270  Refills: 1  Last refilled on 4/3/23    Last clinic appointment: 4/3/23  Next clinic appointment: Not scheduled    Preferred pharmacy:    [address] [phone]     Refill request routed to the provider to review.     Lolis Mojica LPN

## 2023-08-30 RX ORDER — GABAPENTIN 300 MG/1
900 CAPSULE ORAL 3 TIMES DAILY
Qty: 270 CAPSULE | Refills: 0 | Status: SHIPPED | OUTPATIENT
Start: 2023-08-30 | End: 2023-09-21

## 2023-08-30 NOTE — TELEPHONE ENCOUNTER
Chart reviewed - Request appears appropriate. Refilled for 30 day supply.     Lucy Lazo DNP, APRN, AGNP-C  [facility]

## 2023-09-12 ENCOUNTER — TELEPHONE (OUTPATIENT)
Dept: ORTHOPEDICS | Facility: CLINIC | Age: 68
End: 2023-09-12
Payer: COMMERCIAL

## 2023-09-12 DIAGNOSIS — M54.16 LUMBAR RADICULOPATHY: Primary | ICD-10-CM

## 2023-09-12 NOTE — TELEPHONE ENCOUNTER
Patient is scheduled for surgery with Dr. Camejo    Spoke with: patient    Date of Surgery: 09/21/23    Location: [facility]    Informed patient they will need an adult  yes    Additional comments: patient is aware of date and time of the procedure.        Vania Ca MA on 9/12/2023 at 10:34 AM

## 2023-09-12 NOTE — TELEPHONE ENCOUNTER
RN reviewed patient chart. Pre procedure instructions were reviewed with patient.    Odalis Aguilar RNCC

## 2023-09-21 ENCOUNTER — ANCILLARY ORDERS (OUTPATIENT)
Dept: ANESTHESIOLOGY | Facility: CLINIC | Age: 68
End: 2023-09-21

## 2023-09-21 ENCOUNTER — HOSPITAL ENCOUNTER (OUTPATIENT)
Facility: AMBULATORY SURGERY CENTER | Age: 68
Discharge: HOME | End: 2023-09-21
Attending: ANESTHESIOLOGY | Admitting: ANESTHESIOLOGY
Payer: COMMERCIAL

## 2023-09-21 ENCOUNTER — ANCILLARY PROCEDURE (OUTPATIENT)
Dept: RADIOLOGY | Facility: AMBULATORY SURGERY CENTER | Age: 68
End: 2023-09-21
Attending: ANESTHESIOLOGY
Payer: COMMERCIAL

## 2023-09-21 VITALS
RESPIRATION RATE: 17 BRPM | OXYGEN SATURATION: 97 % | HEART RATE: 58 BPM | DIASTOLIC BLOOD PRESSURE: 73 MMHG | TEMPERATURE: 97.5 F | SYSTOLIC BLOOD PRESSURE: 139 MMHG

## 2023-09-21 DIAGNOSIS — G35 MS (MULTIPLE SCLEROSIS) (H): ICD-10-CM

## 2023-09-21 DIAGNOSIS — M79.2 NEUROPATHIC PAIN: ICD-10-CM

## 2023-09-21 DIAGNOSIS — M54.16 LUMBAR RADICULOPATHY: Primary | ICD-10-CM

## 2023-09-21 DIAGNOSIS — M54.16 LUMBAR RADICULOPATHY: ICD-10-CM

## 2023-09-21 PROCEDURE — 62323 NJX INTERLAMINAR LMBR/SAC: CPT

## 2023-09-21 RX ORDER — METHYLPREDNISOLONE ACETATE 40 MG/ML
INJECTION, SUSPENSION INTRA-ARTICULAR; INTRALESIONAL; INTRAMUSCULAR; SOFT TISSUE PRN
Status: DISCONTINUED | OUTPATIENT
Start: 2023-09-21 | End: 2023-09-21 | Stop reason: HOSPADM

## 2023-09-21 RX ORDER — LIDOCAINE HYDROCHLORIDE 10 MG/ML
INJECTION, SOLUTION EPIDURAL; INFILTRATION; INTRACAUDAL; PERINEURAL PRN
Status: DISCONTINUED | OUTPATIENT
Start: 2023-09-21 | End: 2023-09-21 | Stop reason: HOSPADM

## 2023-09-21 RX ORDER — BUPIVACAINE HYDROCHLORIDE 2.5 MG/ML
INJECTION, SOLUTION EPIDURAL; INFILTRATION; INTRACAUDAL PRN
Status: DISCONTINUED | OUTPATIENT
Start: 2023-09-21 | End: 2023-09-21 | Stop reason: HOSPADM

## 2023-09-21 RX ORDER — IOPAMIDOL 408 MG/ML
INJECTION, SOLUTION INTRATHECAL PRN
Status: DISCONTINUED | OUTPATIENT
Start: 2023-09-21 | End: 2023-09-21 | Stop reason: HOSPADM

## 2023-09-21 RX ORDER — GABAPENTIN 300 MG/1
900 CAPSULE ORAL 3 TIMES DAILY
Qty: 270 CAPSULE | Refills: 0 | Status: SHIPPED | OUTPATIENT
Start: 2023-09-21 | End: 2023-10-25

## 2023-09-21 NOTE — TELEPHONE ENCOUNTER
Received fax from pharmacy requesting refill(s) for     gabapentin (NEURONTIN) 300 MG capsule     Last refilled on 8/31/2023.    Pt last seen on 4/3/2023.  Next appt scheduled for NONE.    E-prescribe to:    [facility] - [place], [place] - [address]     Will facilitate refill.

## 2023-09-21 NOTE — OP NOTE
Patient: Linda Grover Age: 68 year old   MRN: [id number] Attending: Dr. Camejo          PAIN MEDICINE CLINIC PROCEDURE NOTE     ATTENDING CLINICIAN:    Lisseth Camejo MD     ASSISTANT CLINICIAN:  Froilan Landin MD      PREPROCEDURE DIAGNOSES:  1.  Lumbar radiculopathy  2.  Chronic low back pain         PROCEDURE(S) PERFORMED:  1.  Interlaminar right L3-L4 lumbar epidural steroid injection   2.  Fluoroscopic guidance for the above-named procedure(s)        ANESTHESIA:  Local.     INDICATIONS:  Linda Grover is a 67 year old male with a history of  chronic low back pain secondary to bilateral lumbosacral radiculopathy (right greater than the left).  Patient's most recent MRI reviewed.  Patient has both L3 and S1 radiculopathy.  His symptoms on the right side restricted above-knee.  On the left side he said that sometimes his entire left lower extremity gives out.  EMG showed L3 radiculopathy.  Therefore decision is made for right L3-L4 epidural steroid injection. He had good pain relief with prior injections.  The patient stated that the patient was in their usual state of health and denied recent anticoagulant use or recent infections.  Therefore, the plan is to perform above mentioned procedures.      Procedure Details:  The patient was met in the procedure room, where the patient was identified by name, medical record number and date of birth.  All of the patient s last minute questions were answered. Written informed consent was obtained and saved in the electronic medical record, after the risks, benefits, and alternatives were discussed with the patient.       A formal time-out procedure was performed, as per protocol, including patient name, title of procedure, and site of procedure, and all in the room concurred.  Routine monitors were applied.       The patient was placed in the prone position on the procedure room table.  All pressure points were checked and comfortably padded.  Routine monitors  were placed.  Vital signs were stable.     A chlorhexidine prep was completed followed by sterile draping per standard procedure.      The AP fluoroscopic view was optimized for approach at right L3-L4 interspace.  The skin over the interspace was infiltrated with 4-5 mL of 1% Lidocaine using a 25 gauge, 1.5 inch needle.  A 20-gauge 3-1/2 inch Tuohy needle was advanced under fluoroscopic guidance with right paramedial approach until it touched lamina. Mutiple AP and lateral fluoroscopic images at this time  are taken as Tuohy needle was advanced to the epidural space. The epidural space was identified, without evidence of blood, cerebrospinal fluid, or parasthesia throughout. Needle tip placement within the epidural space was further confirmed with 1-2 mL of nonionic contrast agent, with the epidural space visualized in the AP and lateral fluoroscopic view(s) with appropriate spread of the agent with fat vacuolization and no intravascular or intrathecal spread noted. Next, 8 mL of a treatment solution containing 2 mL of preservative free 0.25% bupivacaine, 1 mL of Depo-Medrol 40 mg/mL and 5 mL preservative free normal saline was administered slowly. The needle was withdrawn.        Light pressure was held at the puncture site(s) to prevent ecchymosis and oozing.  The patient's skin was cleansed, and hemostasis was confirmed.  Band-aids were applied to the needle injection site(s).       Condition:     The patient remained awake and alert throughout the procedure.  He developed bilateral leg weakness, numbness, tingling and bladder incontinence after the procedure.  Symptoms gradually recovered over the next 1 hour.  He has a history of MS which probably aggravated with epidural steroid injection.  There is also possibility of accidental dural puncture since he never had similar symptoms in the prior epidural injections.  The patient was then discharged to home as per protocol.       Pre-procedure pain score:  10/10  Post-procedure pain score: 0/10

## 2023-09-21 NOTE — DISCHARGE INSTRUCTIONS
Home Care Instructions after an Epidural Steroid Pain Injection    A lumbar epidural steroid injection delivers steroid medication directly into the area that may be causing your lower back pain and/or leg pain. A cervical or thoracic epidural steroid injection delivers steroids into the epidural space surrounding spinal nerve roots to help relieve pain in the upper spine/neck.    Activity  -Rest today  -Do not work today  -Resume normal activity tomorrow  -DO NOT shower for 24 hours  -DO NOT remove bandaid for 24 hours    Pain  -You may experience soreness at the injection site for one or two days  -You may use an ice pack for 20 minutes every 2 hours for the first 24 hours  -You may use a heating pad after the first 24 hours  -You may use Tylenol (acetaminophen) every 4 hours or other pain medicines as     directed by your physician    You may experience numbness radiating into your legs or arms (depending on the procedure location). This numbness may last several hours. Until sensation returns to normal; please use caution in walking, climbing stairs, and stepping out of your vehicle, etc.    Common side effects of steroids:  Not everyone will experience corticosteroid side effects. If side effects are experienced, they will gradually subside in the 7-10 day period following an injection. Most common side effects include:  -Flushed face and/or chest  -Feeling of warmth, particularly in the face but could be an overall feeling of warmth  -Increased blood sugar in diabetic patients  -Menstrual irregularities my occur. If taking hormone-based birth control an alternate method of birth control is recommended  -Sleep disturbances and/or mood swings are possible  -Leg cramps    Please contact us if you have:  -Severe pain  -Fever more than 101.5 degrees Fahrenheit  -Signs of infection at the injection site (redness, swelling, or drainage)    FOR PAIN CENTER PATIENTS:  If you have questions, please contact the Pain  Clinic at [phone] Option #1 between the hours of 7:00 am and 3:00 pm Monday through Friday. After office hours you can contact the on call provider by dialing [phone]. If you need immediate attention, we recommend that you go to a hospital emergency room or dial 102.

## 2023-09-21 NOTE — TELEPHONE ENCOUNTER
Signed Prescriptions:                        Disp   Refills    gabapentin (NEURONTIN) 300 MG capsule      270 ca*0        Sig: Take 3 capsules (900 mg) by mouth 3 times daily           Please schedule follow up for refills.  Authorizing Provider: RDAHA CASON    Covering for provider who is out of the office.      Radha Cason MD  [facility]

## 2023-10-25 DIAGNOSIS — G35 MS (MULTIPLE SCLEROSIS) (H): ICD-10-CM

## 2023-10-25 DIAGNOSIS — M79.2 NEUROPATHIC PAIN: ICD-10-CM

## 2023-10-25 NOTE — TELEPHONE ENCOUNTER
Received fax from pharmacy requesting refill(s) for      gabapentin (NEURONTIN) 300 MG capsule      Last refilled on 9/21/23     Pt last seen on 4/3/2023.  Next appt scheduled for NONE.     E-prescribe to:     [facility] - [place], [place] - [address]     Will facilitate refill.

## 2023-10-30 RX ORDER — GABAPENTIN 300 MG/1
900 CAPSULE ORAL 3 TIMES DAILY
Qty: 270 CAPSULE | Refills: 0 | Status: SHIPPED | OUTPATIENT
Start: 2023-10-30 | End: 2023-11-17

## 2023-10-30 NOTE — TELEPHONE ENCOUNTER
Chart reviewed - Request appears appropriate. Refilled for 90 day supply.     Lucy Lazo DNP, APRN, AGNP-C  [facility]

## 2023-11-14 ASSESSMENT — ANXIETY QUESTIONNAIRES
1. FEELING NERVOUS, ANXIOUS, OR ON EDGE: NOT AT ALL
6. BECOMING EASILY ANNOYED OR IRRITABLE: NOT AT ALL
2. NOT BEING ABLE TO STOP OR CONTROL WORRYING: NOT AT ALL
7. FEELING AFRAID AS IF SOMETHING AWFUL MIGHT HAPPEN: NOT AT ALL
GAD7 TOTAL SCORE: 0
GAD7 TOTAL SCORE: 0
5. BEING SO RESTLESS THAT IT IS HARD TO SIT STILL: NOT AT ALL
3. WORRYING TOO MUCH ABOUT DIFFERENT THINGS: NOT AT ALL
4. TROUBLE RELAXING: NOT AT ALL
IF YOU CHECKED OFF ANY PROBLEMS ON THIS QUESTIONNAIRE, HOW DIFFICULT HAVE THESE PROBLEMS MADE IT FOR YOU TO DO YOUR WORK, TAKE CARE OF THINGS AT HOME, OR GET ALONG WITH OTHER PEOPLE: NOT DIFFICULT AT ALL

## 2023-11-14 ASSESSMENT — PAIN SCALES - PAIN ENJOYMENT GENERAL ACTIVITY SCALE (PEG)
INTERFERED_ENJOYMENT_LIFE: 2
PEG_TOTALSCORE: 1.67
INTERFERED_GENERAL_ACTIVITY: 1
AVG_PAIN_PASTWEEK: 2

## 2023-11-16 ENCOUNTER — TELEPHONE (OUTPATIENT)
Dept: ANESTHESIOLOGY | Facility: CLINIC | Age: 68
End: 2023-11-16
Payer: COMMERCIAL

## 2023-11-16 NOTE — TELEPHONE ENCOUNTER
[facility] Call Center    Phone Message    May a detailed message be left on voicemail: yes     Reason for Call: Other: Patient called requesting to change appointment with JUAN DIEGO Medeiros CNP to a video visit tomorrow 11/17/2023. Please review.     Action Taken: Message routed to:  [facility] ([facility] Pain    Travel Screening: Not Applicable

## 2023-11-16 NOTE — TELEPHONE ENCOUNTER
RN left voicemail advising patient to return call in regard to his visit with JUAN DIEGO Medeiros CNP tomorrow 11/17/23 and his request to switch to a video visit. Writer informed if he is able to come to clinic that would be best, but if unable then ok to switch to video. Left call back number.    Odalis Aguilar RNCC

## 2023-11-17 ENCOUNTER — OFFICE VISIT (OUTPATIENT)
Dept: ANESTHESIOLOGY | Facility: CLINIC | Age: 68
End: 2023-11-17
Payer: COMMERCIAL

## 2023-11-17 VITALS
HEART RATE: 74 BPM | BODY MASS INDEX: 34.72 KG/M2 | DIASTOLIC BLOOD PRESSURE: 67 MMHG | WEIGHT: 248 LBS | OXYGEN SATURATION: 96 % | HEIGHT: 71 IN | SYSTOLIC BLOOD PRESSURE: 122 MMHG

## 2023-11-17 DIAGNOSIS — M54.16 LUMBAR RADICULOPATHY: Primary | ICD-10-CM

## 2023-11-17 DIAGNOSIS — G35 MS (MULTIPLE SCLEROSIS) (H): ICD-10-CM

## 2023-11-17 DIAGNOSIS — M79.2 NEUROPATHIC PAIN: ICD-10-CM

## 2023-11-17 PROCEDURE — 99214 OFFICE O/P EST MOD 30 MIN: CPT

## 2023-11-17 RX ORDER — GABAPENTIN 300 MG/1
900 CAPSULE ORAL 3 TIMES DAILY
Qty: 810 CAPSULE | Refills: 1 | Status: SHIPPED | OUTPATIENT
Start: 2023-11-17 | End: 2024-09-23

## 2023-11-17 ASSESSMENT — PAIN SCALES - GENERAL: PAINLEVEL: MILD PAIN (2)

## 2023-11-17 NOTE — LETTER
11/17/2023       RE: Linda Grover  [id number]  [facility] [id number]       Dear Colleague,    Thank you for referring your patient, Linda Grover, to the [facility] FOR COM[facility]HENSIVE PAIN MANAGEMENT [place] at [facility]. Please see a copy of my visit note below.    [facility] [place] [facility]     Date of visit: 11/17/2023      Assessment:   Linda Grover is a 68 year old male with a past medical history significant for MS, HTN, lumbar radiculopathy, s/p right TKA who presents with complaints of BLE pain.      Leg pain - Symptoms have been present for 3 years, describes pain as mostly burning and tingling, sometimes aching and dull as well. He underwent L3-4 AIME x 2 with Dr. Camejo, 9/1/22 and 10/20/22, which he initially found helpful for pain, but notes short-term benefit for a few weeks. Lumbar MRI from 10/13/22 demonstrates multilevel degenerative changes. Etiology of pain is likely associated with multiple factors, including underlying MS, lumbar radiculopathy, possible neuropathy.     Assigned to [facility] nursing team.     Visit Diagnoses:  1. Lumbar radiculopathy    2. MS (multiple sclerosis) (H)    3. Neuropathic pain        Plan:  Diagnosis reviewed, treatment option addressed, and risk/benifits discussed.  Self-care instructions given.  I am recommending a multidisciplinary treatment plan to help this patient better manage their pain.                  1.  Pain Physical Therapy:  NO   Continue activity as tolerated at home.               2.  Pain Psychologist to address relaxation, behavioral change, coping style, and other factors important to improvement.  NO              3.  Diagnostic Studies:  None               4.  Medication Management:   Continue gabapentin to 900 mg three times daily, -[phone] (currently how he is taking). He notes increased pain symptoms if he misses afternoon dose. No side effects.  Refilled today for 90 day supply with one additional refill.               5.  Potential procedures:                           1. We will discuss spinal cord stimulator at future visit. He has decided against pursuing lumbar fusion at this time.                           2. He had L3-4 AIME with Dr. Camejo last on 9/21/23. He appreciates more than 50% improvement in symptoms that lasts on average for 3 months. Advised he may repeat anytime after 12/21/23 and can contact clinic to request orders.               6.  Referrals:              7.  Follow up with JUAN DIEGO Medeiros CNP in 6 months around 5/17/23 or sooner if needed       Review of Electronic Chart: Today I have also reviewed available medical information in the patient's medical record at [facility] ([facility]) and Care Everywhere (if available), including relevant provider notes, laboratory work, and imaging.     Lucy Lazo DNP, JUAN DIEGO, AGNP-C  [facility]     -------------------------------------------------    Subjective:    Chief complaint:   Chief Complaint   Patient presents with    Follow Up     Follow-up Lower Back Pain-Medication Check       Interval history:  Linda Grover is a 68 year old male last seen on 4/3/23.  They are a patient of mine seen in follow up.     Recommendations/plan at the last visit included:              1.  Pain Physical Therapy:  NO   Continue activity as tolerated at home.               2.  Pain Psychologist to address relaxation, behavioral change, coping style, and other factors important to improvement.  NO              3.  Diagnostic Studies:  Reviewed lumbar MRI               4.  Medication Management:   Increase gabapentin to 900 mg TID. He has been taking 900-600-900 and does notice some increased pain the afternoon, sometimes does not remember to take at the same time. We may consider further increase versus alternative neuropathic, pending efficacy of repeat LESI.               5.   Potential procedures:                           1. We will discuss spinal cord stimulator at future visit. He has decided against pursuing lumbar fusion at this time.                           2. He had L3-4 AIME with Dr. Camejo in the past, found helpful with right leg pain. Last done 1/19/23. He reports symptoms have started to return. Of note, he appreciates more than 50% improvement in symptoms that lasts on average for 3 months. He is interested in repeating LESI, orders placed to schedule with Dr. Camejo.               6.  Referrals:              7.  Follow up with JUAN DIEGO Medeiros CNP in 6-8 weeks after repeat LESI.     Since his last visit, Linda Grover reports:  -He had right L3-4 AIME with Dr. Camejo on 9/21/23.  -He reports LESI was okay, but he ended having complications afterwards.   -He states he lost urinary control and leg weakness, states Dr. Camejo told him the learner injected in the wrong area and caused these adverse effects.   -He reports he did get benefit from the injection.   -He continues to take gabapentin, current dosage ordered 900 mg TID. He does take [phone] and find helpful, advised he could trial taking this way or 900 TID.   -He states he has had period of time recently where he has missed afternoon dose of gabapentin and noted changes in pain levels.   -His afternoon gabapentin dosage was increased at last visit from 600 mg to 900 mg, thinks this has been helpful.   -No gabapentin side effects.   -He states he has been dealing with LLE lower leg pain, states he saw ortho and they told him pain was associated to knee OA, had an injection.   -He states current plan is working, pain is stable overall.   Pain Information:   Pain rating: averages 2/10 on a 0-10 scale.      Interval history from last visit on 4/3/23:  -his pain is about the same/perhaps slightly worse than was at last visit.   -He had repeat lumbar AIME with Dr. Camejo on 1/19/23 that was helpful with his BLE  radicular pain.  -He recently saw spine surgeon through [facility], states provider does not think surgery is advisable.   -He is very active, especially when in [place].   -They returned to  in early March.   -He continues gabapentin 900-600-900 mg.   -No side effects from medications.   -After LESI, he will appreciate near resolution with BLE pain for 2-3 weeks, then 50%+ improvement for anywhere from 2-4 months.   -The AIME really improves left leg symptoms, sometimes continues to have some RLE pain that stops in calf.   Pain Information:              Pain rating: averages 2/10 on a 0-10 scale.        Interval history from last visit on 12/19/22:  -His pain has improved since last visit.   -RLE pain was waking him up overnight, this has improved a lot.   -LLE is still problematic again, particularly with driving and sitting upright.   -Left leg pain increases when knee is bent at 90 degree angle.   -He denies side effects from gabapentin.   -He is interested in increased dosage to see if left leg symptoms improve.   -Dr. Louis is not in favor of changing baclofen dosage at this time.   -He is still considering lumbar fusion.   -Wife Gayathri present for this visit.   -Their son is living in [place] and on his way home for the holidays, was delayed due to snow storm in [facility].         HPI/Interval history from last visit on 11/4/22:  Linda Grover is a 67 year old male presents with a chief complaint of BLE radicular pain.      The pain has been present for 3 years .    The pain is Mild Pain (2) in severity.    The pain is described as burning, tingling, aching and dull.   The pain is alleviated by meds, rest.    It is exacerbated by lifting.    Things not tried - increased dose gabapentin  The patient otherwise denies bowel or bladder incontinence, parasthesias, weakness, saddle anesthesia, unintentional weight loss, or fever/chills/sweats.      Linda Grover has been seen at a pain clinic in the past.   Procedures with Dr. Camejo (see below).      -He has h/o MS.  -He previously saw neurologist, asked if symptoms could be related to MS.  -He has LESIs, initially found helpful but not lasting benefit.   -When driving a car and legs are bent, leg symptoms worsen.   -Procedures done recently by Dr. Camejo:   10/20/22 with Nell   PROCEDURE(S) PERFORMED:  1.  Interlaminar right L3-L4 lumbar epidural steroid injection   9/1/22  PROCEDURE(S) PERFORMED:  1.  Interlaminar lumbar epidural steroid injection (L3/4)      -He states he is interested in possible SCS therapy.   -He has not had LSB in past.   -It seems he has not engaged in a lot of conservative therapy yet for symptoms.            Current Pain Treatments:     Medications:               Gabapentin [phone] (ordered 900 TID, advised he may trial taking either way)                Other therapies:               Injections - AIME with Dr. Camejo         Current MME: 0     Review of [place] Prescription Monitoring Program (): No concern for abuse or misuse of controlled medications based on this report. Reviewed - appears appropriate.      Annual Controlled Substance Agreement/UDS due date: N/A     Past pain   LESI  - last on 9/21/23    Medications:  Current Outpatient Medications   Medication Sig Dispense Refill    atorvastatin (LIPITOR) 40 MG tablet Take 40 mg by mouth At Bedtime      baclofen (LIORESAL) 20 MG tablet Take 1 tablet (20 mg) by mouth 3 times daily 270 tablet 3    cholecalciferol (VITAMIN D3) 5000 UNITS CAPS capsule Take 7,000 Units by mouth daily       gabapentin (NEURONTIN) 300 MG capsule Take 3 capsules (900 mg) by mouth 3 times daily 810 capsule 1    HydrOXYzine Pamoate (VISTARIL PO) Take 50 mg by mouth 3 times daily      lisinopril (PRINIVIL/ZESTRIL) 20 MG tablet Take 20 mg by mouth At Bedtime      Multiple Vitamins-Minerals (MULTIVITAMIN ADULT) TABS Take 1 tablet by mouth daily      omeprazole (PRILOSEC) 20 MG CR capsule Take 20 mg by mouth  "daily      traZODone (DESYREL) 50 MG tablet Take 1-2 tablets ( mg) by mouth At Bedtime 180 tablet 3       Medical History: any changes in medical history since they were last seen? No      Objective:    Physical Exam:  Blood pressure 122/67, pulse 74, height 1.803 m (5' 11\"), weight 112.5 kg (248 lb), SpO2 96%.  Constitutional: Well developed, well nourished, appears stated age.  Gait: Intact   HEENT: Head atraumatic, normocephalic. Eyes without conjunctival injection or jaundice. Oropharynx clear. Neck supple. No obvious neck masses.  Skin: No rash, lesions, or petechiae of exposed skin.   Psychiatric/mental status: Alert, without lethargy or stupor. Speech fluent. Appropriate affect. Mood normal. Able to follow commands without difficulty.     Diagnostic Tests/Imaging/Labs:  CMP 5/16/23 - hepatic function intact, GFR >60    BILLING TIME DOCUMENTATION:   The total TIME spent on this patient on the date of the encounter/appointment was 28 minutes.      TOTAL TIME includes:   Time spent preparing to see the patient (reviewing records and tests)   Time spent face to face (or over the phone) with the patient   Time spent ordering tests, medications, procedures and referrals   Time spent Referring and communicating with other healthcare professionals   Time spent documenting clinical information in Epic             Again, thank you for allowing me to participate in the care of your patient.      Sincerely,    JUAN DIEGO Medeiros CNP    "

## 2023-11-17 NOTE — PATIENT INSTRUCTIONS
1.  Pain Physical Therapy:  NO   Continue activity as tolerated at home.               2.  Pain Psychologist to address relaxation, behavioral change, coping style, and other factors important to improvement.  NO              3.  Diagnostic Studies:  None               4.  Medication Management:   Continue gabapentin to 900 mg three times daily, -[phone] (currently how he is taking). He notes increased pain symptoms if he misses afternoon dose. No side effects. Refilled today for 90 day supply with one additional refill.               5.  Potential procedures:                           1. We will discuss spinal cord stimulator at future visit. He has decided against pursuing lumbar fusion at this time.                           2. He had L3-4 AIME with Dr. Camejo last on 9/21/23. He appreciates more than 50% improvement in symptoms that lasts on average for 3 months. Advised he may repeat anytime after 12/21/23 and can contact clinic to request orders.               6.  Referrals:              7.  Follow up with JUAN DIEGO Medeiros CNP in 6 months around 5/17/23 or sooner if needed

## 2023-11-17 NOTE — NURSING NOTE
Patient presents with:  Follow Up: Follow-up Lower Back Pain-Medication Check      Mild Pain (2)         What medications are you using for pain? Gabapentin    (New patients only) Have you been seen by another pain clinic/ provider? no    (Return Patients only) What refills are you needing today? no

## 2023-11-17 NOTE — PROGRESS NOTES
[facility]     Date of visit: 11/17/2023      Assessment:   Linda Grover is a 68 year old male with a past medical history significant for MS, HTN, lumbar radiculopathy, s/p right TKA who presents with complaints of BLE pain.      Leg pain - Symptoms have been present for 3 years, describes pain as mostly burning and tingling, sometimes aching and dull as well. He underwent L3-4 AIME x 2 with Dr. Camejo, 9/1/22 and 10/20/22, which he initially found helpful for pain, but notes short-term benefit for a few weeks. Lumbar MRI from 10/13/22 demonstrates multilevel degenerative changes. Etiology of pain is likely associated with multiple factors, including underlying MS, lumbar radiculopathy, possible neuropathy.     Assigned to [facility] nursing team.     Visit Diagnoses:  1. Lumbar radiculopathy    2. MS (multiple sclerosis) (H)    3. Neuropathic pain        Plan:  Diagnosis reviewed, treatment option addressed, and risk/benifits discussed.  Self-care instructions given.  I am recommending a multidisciplinary treatment plan to help this patient better manage their pain.                  1.  Pain Physical Therapy:  NO   Continue activity as tolerated at home.               2.  Pain Psychologist to address relaxation, behavioral change, coping style, and other factors important to improvement.  NO              3.  Diagnostic Studies:  None               4.  Medication Management:   Continue gabapentin to 900 mg three times daily, -[phone] (currently how he is taking). He notes increased pain symptoms if he misses afternoon dose. No side effects. Refilled today for 90 day supply with one additional refill.               5.  Potential procedures:                           1. We will discuss spinal cord stimulator at future visit. He has decided against pursuing lumbar fusion at this time.                           2. He had L3-4 AIME with Dr. Camejo last on 9/21/23. He appreciates more than 50%  improvement in symptoms that lasts on average for 3 months. Advised he may repeat anytime after 12/21/23 and can contact clinic to request orders.               6.  Referrals:              7.  Follow up with JUAN DIEGO Medeiros CNP in 6 months around 5/17/23 or sooner if needed       Review of Electronic Chart: Today I have also reviewed available medical information in the patient's medical record at [facility] ([facility]) and Care Everywhere (if available), including relevant provider notes, laboratory work, and imaging.     Lucy Lazo DNP, JUAN DIEGO, AGNP-C  [facility]     -------------------------------------------------    Subjective:    Chief complaint:   Chief Complaint   Patient presents with    Follow Up     Follow-up Lower Back Pain-Medication Check       Interval history:  Linda Grover is a 68 year old male last seen on 4/3/23.  They are a patient of mine seen in follow up.     Recommendations/plan at the last visit included:              1.  Pain Physical Therapy:  NO   Continue activity as tolerated at home.               2.  Pain Psychologist to address relaxation, behavioral change, coping style, and other factors important to improvement.  NO              3.  Diagnostic Studies:  Reviewed lumbar MRI               4.  Medication Management:   Increase gabapentin to 900 mg TID. He has been taking 900-600-900 and does notice some increased pain the afternoon, sometimes does not remember to take at the same time. We may consider further increase versus alternative neuropathic, pending efficacy of repeat LESI.               5.  Potential procedures:                           1. We will discuss spinal cord stimulator at future visit. He has decided against pursuing lumbar fusion at this time.                           2. He had L3-4 AIME with Dr. Camejo in the past, found helpful with right leg pain. Last done 1/19/23. He reports symptoms have started to return. Of note, he  appreciates more than 50% improvement in symptoms that lasts on average for 3 months. He is interested in repeating LESI, orders placed to schedule with Dr. Camejo.               6.  Referrals:              7.  Follow up with JUAN DIEGO Medeiros CNP in 6-8 weeks after repeat LESI.     Since his last visit, Linda Vasquezjosechina reports:  -He had right L3-4 AIME with Dr. Camejo on 9/21/23.  -He reports LESI was okay, but he ended having complications afterwards.   -He states he lost urinary control and leg weakness, states Dr. Camejo told him the learner injected in the wrong area and caused these adverse effects.   -He reports he did get benefit from the injection.   -He continues to take gabapentin, current dosage ordered 900 mg TID. He does take [phone] and find helpful, advised he could trial taking this way or 900 TID.   -He states he has had period of time recently where he has missed afternoon dose of gabapentin and noted changes in pain levels.   -His afternoon gabapentin dosage was increased at last visit from 600 mg to 900 mg, thinks this has been helpful.   -No gabapentin side effects.   -He states he has been dealing with LLE lower leg pain, states he saw ortho and they told him pain was associated to knee OA, had an injection.   -He states current plan is working, pain is stable overall.   Pain Information:   Pain rating: averages 2/10 on a 0-10 scale.      Interval history from last visit on 4/3/23:  -his pain is about the same/perhaps slightly worse than was at last visit.   -He had repeat lumbar AIME with Dr. Camejo on 1/19/23 that was helpful with his BLE radicular pain.  -He recently saw spine surgeon through [facility], states provider does not think surgery is advisable.   -He is very active, especially when in [place].   -They returned to  in early March.   -He continues gabapentin 900-600-900 mg.   -No side effects from medications.   -After LESI, he will appreciate near resolution with BLE pain for  2-3 weeks, then 50%+ improvement for anywhere from 2-4 months.   -The AIME really improves left leg symptoms, sometimes continues to have some RLE pain that stops in calf.   Pain Information:              Pain rating: averages 2/10 on a 0-10 scale.        Interval history from last visit on 12/19/22:  -His pain has improved since last visit.   -RLE pain was waking him up overnight, this has improved a lot.   -LLE is still problematic again, particularly with driving and sitting upright.   -Left leg pain increases when knee is bent at 90 degree angle.   -He denies side effects from gabapentin.   -He is interested in increased dosage to see if left leg symptoms improve.   -Dr. Louis is not in favor of changing baclofen dosage at this time.   -He is still considering lumbar fusion.   -Wife Gayathri present for this visit.   -Their son is living in [place] and on his way home for the holidays, was delayed due to snow storm in [facility].         HPI/Interval history from last visit on 11/4/22:  Linda Grover is a 67 year old male presents with a chief complaint of BLE radicular pain.      The pain has been present for 3 years .    The pain is Mild Pain (2) in severity.    The pain is described as burning, tingling, aching and dull.   The pain is alleviated by meds, rest.    It is exacerbated by lifting.    Things not tried - increased dose gabapentin  The patient otherwise denies bowel or bladder incontinence, parasthesias, weakness, saddle anesthesia, unintentional weight loss, or fever/chills/sweats.      Linda Grover has been seen at a pain clinic in the past.  Procedures with Dr. Camejo (see below).      -He has h/o MS.  -He previously saw neurologist, asked if symptoms could be related to MS.  -He has LESIs, initially found helpful but not lasting benefit.   -When driving a car and legs are bent, leg symptoms worsen.   -Procedures done recently by Dr. Camejo:   10/20/22 with Nell   PROCEDURE(S) PERFORMED:  1.   "Interlaminar right L3-L4 lumbar epidural steroid injection   9/1/22  PROCEDURE(S) PERFORMED:  1.  Interlaminar lumbar epidural steroid injection (L3/4)      -He states he is interested in possible SCS therapy.   -He has not had LSB in past.   -It seems he has not engaged in a lot of conservative therapy yet for symptoms.            Current Pain Treatments:     Medications:               Gabapentin [phone] (ordered 900 TID, advised he may trial taking either way)                Other therapies:               Injections - AIME with Dr. Camejo         Current MME: 0     Review of [place] Prescription Monitoring Program (): No concern for abuse or misuse of controlled medications based on this report. Reviewed - appears appropriate.      Annual Controlled Substance Agreement/UDS due date: N/A     Past pain   LESI  - last on 9/21/23    Medications:  Current Outpatient Medications   Medication Sig Dispense Refill    atorvastatin (LIPITOR) 40 MG tablet Take 40 mg by mouth At Bedtime      baclofen (LIORESAL) 20 MG tablet Take 1 tablet (20 mg) by mouth 3 times daily 270 tablet 3    cholecalciferol (VITAMIN D3) 5000 UNITS CAPS capsule Take 7,000 Units by mouth daily       gabapentin (NEURONTIN) 300 MG capsule Take 3 capsules (900 mg) by mouth 3 times daily 810 capsule 1    HydrOXYzine Pamoate (VISTARIL PO) Take 50 mg by mouth 3 times daily      lisinopril (PRINIVIL/ZESTRIL) 20 MG tablet Take 20 mg by mouth At Bedtime      Multiple Vitamins-Minerals (MULTIVITAMIN ADULT) TABS Take 1 tablet by mouth daily      omeprazole (PRILOSEC) 20 MG CR capsule Take 20 mg by mouth daily      traZODone (DESYREL) 50 MG tablet Take 1-2 tablets ( mg) by mouth At Bedtime 180 tablet 3       Medical History: any changes in medical history since they were last seen? No      Objective:    Physical Exam:  Blood pressure 122/67, pulse 74, height 1.803 m (5' 11\"), weight 112.5 kg (248 lb), SpO2 96%.  Constitutional: Well developed, well " nourished, appears stated age.  Gait: Intact   HEENT: Head atraumatic, normocephalic. Eyes without conjunctival injection or jaundice. Oropharynx clear. Neck supple. No obvious neck masses.  Skin: No rash, lesions, or petechiae of exposed skin.   Psychiatric/mental status: Alert, without lethargy or stupor. Speech fluent. Appropriate affect. Mood normal. Able to follow commands without difficulty.     Diagnostic Tests/Imaging/Labs:  CMP 5/16/23 - hepatic function intact, GFR >60    BILLING TIME DOCUMENTATION:   The total TIME spent on this patient on the date of the encounter/appointment was 28 minutes.      TOTAL TIME includes:   Time spent preparing to see the patient (reviewing records and tests)   Time spent face to face (or over the phone) with the patient   Time spent ordering tests, medications, procedures and referrals   Time spent Referring and communicating with other healthcare professionals   Time spent documenting clinical information in Epic

## 2023-12-07 ENCOUNTER — TELEPHONE (OUTPATIENT)
Dept: ANESTHESIOLOGY | Facility: CLINIC | Age: 68
End: 2023-12-07
Payer: COMMERCIAL

## 2023-12-07 NOTE — TELEPHONE ENCOUNTER
Please call the Pain Management Clinic at [phone] to schedule a follow up appointment with Lucy Lazo.

## 2024-01-12 DIAGNOSIS — R25.2 SPASTICITY: ICD-10-CM

## 2024-01-12 DIAGNOSIS — G35 MS (MULTIPLE SCLEROSIS) (H): ICD-10-CM

## 2024-01-12 RX ORDER — BACLOFEN 20 MG/1
20 TABLET ORAL 3 TIMES DAILY
Qty: 270 TABLET | Refills: 3 | Status: SHIPPED | OUTPATIENT
Start: 2024-01-12 | End: 2024-06-26

## 2024-01-12 NOTE — TELEPHONE ENCOUNTER
Received refill request for baclofen from [facility]; Patient was last seen in June 2023 and has follow up appointment in June 2024 with Dr Louis. Refilled per MS refill protocol.    Felisa Daly RN

## 2024-05-06 ENCOUNTER — OFFICE VISIT (OUTPATIENT)
Dept: ANESTHESIOLOGY | Facility: CLINIC | Age: 69
End: 2024-05-06
Payer: COMMERCIAL

## 2024-05-06 ENCOUNTER — TELEPHONE (OUTPATIENT)
Dept: ANESTHESIOLOGY | Facility: CLINIC | Age: 69
End: 2024-05-06

## 2024-05-06 VITALS — SYSTOLIC BLOOD PRESSURE: 116 MMHG | OXYGEN SATURATION: 99 % | DIASTOLIC BLOOD PRESSURE: 65 MMHG | HEART RATE: 85 BPM

## 2024-05-06 DIAGNOSIS — M54.16 LUMBAR RADICULOPATHY: Primary | ICD-10-CM

## 2024-05-06 DIAGNOSIS — M79.2 NEUROPATHIC PAIN: ICD-10-CM

## 2024-05-06 DIAGNOSIS — G35 MS (MULTIPLE SCLEROSIS) (H): ICD-10-CM

## 2024-05-06 PROCEDURE — 99214 OFFICE O/P EST MOD 30 MIN: CPT

## 2024-05-06 ASSESSMENT — PAIN SCALES - PAIN ENJOYMENT GENERAL ACTIVITY SCALE (PEG)
AVG_PAIN_PASTWEEK: 4
PEG_TOTALSCORE: 3.33
INTERFERED_GENERAL_ACTIVITY: 3
INTERFERED_ENJOYMENT_LIFE: 3

## 2024-05-06 ASSESSMENT — PAIN SCALES - GENERAL: PAINLEVEL: MILD PAIN (3)

## 2024-05-06 NOTE — CONFIDENTIAL NOTE
RN reviewed pre-procedure instructions with patient at office visit 5/9/24.     Ariella Whitt RN

## 2024-05-06 NOTE — NURSING NOTE
Chief Complaint   Patient presents with    RECHECK       Pain medications: Gabapentin, Baclofen    Leslee Wilkerson, EMT

## 2024-05-06 NOTE — PROGRESS NOTES
[facility] Pain Management     Date of visit: 5/6/2024      Assessment:   Linda Grover is a 68 year old male with a past medical history significant for MS, HTN, lumbar radiculopathy, s/p right TKA who presents with complaints of BLE pain.      Leg pain - Symptoms have been present for 3 years, describes pain as mostly burning and tingling, sometimes aching and dull as well. He underwent L3-4 AIME x 2 with Dr. Camejo, 9/1/22 and 10/20/22, which he initially found helpful for pain, but notes short-term benefit for a few weeks. Lumbar MRI from 10/13/22 demonstrates multilevel degenerative changes. Etiology of pain is likely associated with multiple factors, including underlying MS, lumbar radiculopathy, possible neuropathy.      [place] [facility] [place]     Visit Diagnoses:  1. Lumbar radiculopathy    2. Neuropathic pain    3. MS (multiple sclerosis) (H)        Plan:  Diagnosis reviewed, treatment option addressed, and risk/benifits discussed.  Self-care instructions given.  I am recommending a multidisciplinary treatment plan to help this patient better manage their pain.                  1.  Pain Physical Therapy:  NO   Continue activity as tolerated at home.               2.  Pain Psychologist to address relaxation, behavioral change, coping style, and other factors important to improvement.  NO              3.  Diagnostic Studies:  None               4.  Medication Management:   Continue gabapentin to 900 mg three times daily, -[phone] (currently how he is taking). He notes increased pain symptoms if he misses afternoon dose. No side effects.   Baclofen 20 mg three times daily - MS clinic provider managing.               5.  Potential procedures:                           1. We may discuss spinal cord stimulator at future visit. He has decided against pursuing lumbar fusion at this time.                           2. He had L3-4 AIME with Dr. Camejo last on 9/21/23. He appreciates more than  50% improvement in symptoms that lasts on average for 3 months. Orders placed to schedule repeat injection now. Advised he may contact clinic to request orders for scheduling another repeat injection x 1 (at least 3 months apart from prior injection) in between 6 month follow up visits.               6.  Referrals: None               7.  Follow up with JUAN DIEGO Medeiros CNP in 6 months or sooner if needed.       Review of Electronic Chart: Today I have also reviewed available medical information in the patient's medical record at [facility] ([facility]) and Care Everywhere (if available), including relevant provider notes, laboratory work, and imaging.     Lucy Lazo DNP, JUAN DIEGO, AGNP-C  [facility] Pain Management     -------------------------------------------------    Subjective:    Chief complaint:   Chief Complaint   Patient presents with    RECHECK       Interval history:  Linda Grover is a 68 year old male last seen on 11/17/23.  They are a patient of mine seen in follow up.     Recommendations/plan at the last visit included:              1.  Pain Physical Therapy:  NO   Continue activity as tolerated at home.               2.  Pain Psychologist to address relaxation, behavioral change, coping style, and other factors important to improvement.  NO              3.  Diagnostic Studies:  None               4.  Medication Management:   Continue gabapentin to 900 mg three times daily, -[phone] (currently how he is taking). He notes increased pain symptoms if he misses afternoon dose. No side effects. Refilled today for 90 day supply with one additional refill.               5.  Potential procedures:                           1. We will discuss spinal cord stimulator at future visit. He has decided against pursuing lumbar fusion at this time.                           2. He had L3-4 AIME with Dr. Camejo last on 9/21/23. He appreciates more than 50% improvement in symptoms that lasts on average  for 3 months. Advised he may repeat anytime after 12/21/23 and can contact clinic to request orders.               6.  Referrals:              7.  Follow up with JUAN DIEGO Medeiros CNP in 6 months around 5/17/23 or sooner if needed     Since his last visit, Linda Grover reports:  -Overall, pain is about the same as prior visit, no significant changes or new symptoms.   -His last LESI was 9/21/23, orders placed at last visit for repeat injection but he did not schedule.   -He is having recurrent anterior thigh pain/cramping.   -He is interested in repeat injection, leaves for [place] next week and would like to be able to get in for injection before then if possible.  -His tx plan is working.   -He reports reducing gabapentin dose to 900 mg in morning, then 600 mg in afternoon and evening when pain was not as severe.   -He denies side effects, notes enhanced pain benefit with higher dose.     Pain Information:   Pain rating: averages 4/10 on a 0-10 scale.      Interval history from last visit on 11/17/23:  -He had right L3-4 AIME with Dr. Camejo on 9/21/23.  -He reports LESI was okay, but he ended having complications afterwards.   -He states he lost urinary control and leg weakness, states Dr. Camejo told him the learner injected in the wrong area and caused these adverse effects.   -He reports he did get benefit from the injection.   -He continues to take gabapentin, current dosage ordered 900 mg TID. He does take [phone] and find helpful, advised he could trial taking this way or 900 TID.   -He states he has had period of time recently where he has missed afternoon dose of gabapentin and noted changes in pain levels.   -His afternoon gabapentin dosage was increased at last visit from 600 mg to 900 mg, thinks this has been helpful.   -No gabapentin side effects.   -He states he has been dealing with LLE lower leg pain, states he saw ortho and they told him pain was associated to knee OA, had an injection.    -He states current plan is working, pain is stable overall.   Pain Information:              Pain rating: averages 2/10 on a 0-10 scale.        Interval history from last visit on 4/3/23:  -his pain is about the same/perhaps slightly worse than was at last visit.   -He had repeat lumbar AIME with Dr. Camejo on 1/19/23 that was helpful with his BLE radicular pain.  -He recently saw spine surgeon through [facility], states provider does not think surgery is advisable.   -He is very active, especially when in [place].   -They returned to  in early March.   -He continues gabapentin 900-600-900 mg.   -No side effects from medications.   -After LESI, he will appreciate near resolution with BLE pain for 2-3 weeks, then 50%+ improvement for anywhere from 2-4 months.   -The AIME really improves left leg symptoms, sometimes continues to have some RLE pain that stops in calf.   Pain Information:              Pain rating: averages 2/10 on a 0-10 scale.        Interval history from last visit on 12/19/22:  -His pain has improved since last visit.   -RLE pain was waking him up overnight, this has improved a lot.   -LLE is still problematic again, particularly with driving and sitting upright.   -Left leg pain increases when knee is bent at 90 degree angle.   -He denies side effects from gabapentin.   -He is interested in increased dosage to see if left leg symptoms improve.   -Dr. Louis is not in favor of changing baclofen dosage at this time.   -He is still considering lumbar fusion.   -Wife Gayathri present for this visit.   -Their son is living in [place] and on his way home for the holidays, was delayed due to snow storm in [facility].         HPI/Interval history from last visit on 11/4/22:  Linda Grover is a 67 year old male presents with a chief complaint of BLE radicular pain.      The pain has been present for 3 years .    The pain is Mild Pain (2) in severity.    The pain is described as burning, tingling, aching and dull.    The pain is alleviated by meds, rest.    It is exacerbated by lifting.    Things not tried - increased dose gabapentin  The patient otherwise denies bowel or bladder incontinence, parasthesias, weakness, saddle anesthesia, unintentional weight loss, or fever/chills/sweats.      Linda Grover has been seen at a pain clinic in the past.  Procedures with Dr. Camejo (see below).      -He has h/o MS.  -He previously saw neurologist, asked if symptoms could be related to MS.  -He has LESIs, initially found helpful but not lasting benefit.   -When driving a car and legs are bent, leg symptoms worsen.   -Procedures done recently by Dr. Camejo:   10/20/22 with Nell   PROCEDURE(S) PERFORMED:  1.  Interlaminar right L3-L4 lumbar epidural steroid injection   9/1/22  PROCEDURE(S) PERFORMED:  1.  Interlaminar lumbar epidural steroid injection (L3/4)      -He states he is interested in possible SCS therapy.   -He has not had LSB in past.   -It seems he has not engaged in a lot of conservative therapy yet for symptoms.            Current Pain Treatments:     Medications:               Gabapentin [phone] (ordered 900 TID, advised he may trial taking either way)   Baclofen 20 mg TID (MS clinic managing)                Other therapies:               Injections - AIME with Dr. Camejo         Current MME: 0     Review of [place] Prescription Monitoring Program (): No concern for abuse or misuse of controlled medications based on this report. Reviewed - appears appropriate.      Annual Controlled Substance Agreement/UDS due date: N/A     Past pain   LESI  - last on 9/21/23      Medications:  Current Outpatient Medications   Medication Sig Dispense Refill    atorvastatin (LIPITOR) 40 MG tablet Take 40 mg by mouth At Bedtime      baclofen (LIORESAL) 20 MG tablet Take 1 tablet (20 mg) by mouth 3 times daily 270 tablet 3    cholecalciferol (VITAMIN D3) 5000 UNITS CAPS capsule Take 7,000 Units by mouth daily       gabapentin  (NEURONTIN) 300 MG capsule Take 3 capsules (900 mg) by mouth 3 times daily 810 capsule 1    HydrOXYzine Pamoate (VISTARIL PO) Take 50 mg by mouth 3 times daily      lisinopril (PRINIVIL/ZESTRIL) 20 MG tablet Take 20 mg by mouth At Bedtime      Multiple Vitamins-Minerals (MULTIVITAMIN ADULT) TABS Take 1 tablet by mouth daily      omeprazole (PRILOSEC) 20 MG CR capsule Take 20 mg by mouth daily      traZODone (DESYREL) 50 MG tablet Take 1-2 tablets ( mg) by mouth At Bedtime 180 tablet 3       Medical History: any changes in medical history since they were last seen?  See chart for updates since LOV with me, though does not appear to have significant changes.       Objective:    Physical Exam:  Blood pressure 116/65, pulse 85, SpO2 99%.  Constitutional: Well developed, well nourished, appears stated age.  Gait: Intact   HEENT: Head atraumatic, normocephalic. Eyes without conjunctival injection or jaundice. Oropharynx clear. Neck supple. No obvious neck masses.  Skin: No rash, lesions, or petechiae of exposed skin.   Psychiatric/mental status: Alert, without lethargy or stupor. Speech fluent. Appropriate affect. Mood normal. Able to follow commands without difficulty.     Diagnostic Tests/Imaging/Labs:  BMP on 1/25/24 - GFR >60 (NL per lab ranges)    BILLING TIME DOCUMENTATION:   The total TIME spent on this patient on the date of the encounter/appointment was 15 minutes.      TOTAL TIME includes:   Time spent preparing to see the patient (reviewing records and tests)   Time spent face to face (or over the phone) with the patient   Time spent ordering tests, medications, procedures and referrals   Time spent Referring and communicating with other healthcare professionals   Time spent documenting clinical information in Epic

## 2024-05-06 NOTE — LETTER
5/6/2024       RE: Linda Grover  [address]  [facility] [address]     Dear Colleague,    Thank you for referring your patient, Linda Grover, to the [facility] CLINIC FOR COMPREHENSIVE PAIN MANAGEMENT [place] at [facility]. Please see a copy of my visit note below.    [facility]     Date of visit: 5/6/2024      Assessment:   Linda Grover is a 68 year old male with a past medical history significant for MS, HTN, lumbar radiculopathy, s/p right TKA who presents with complaints of BLE pain.      Leg pain - Symptoms have been present for 3 years, describes pain as mostly burning and tingling, sometimes aching and dull as well. He underwent L3-4 AIME x 2 with Dr. Camejo, 9/1/22 and 10/20/22, which he initially found helpful for pain, but notes short-term benefit for a few weeks. Lumbar MRI from 10/13/22 demonstrates multilevel degenerative changes. Etiology of pain is likely associated with multiple factors, including underlying MS, lumbar radiculopathy, possible neuropathy.      Assigned to [facility] nursing team.     Visit Diagnoses:  1. Lumbar radiculopathy    2. Neuropathic pain    3. MS (multiple sclerosis) (H)        Plan:  Diagnosis reviewed, treatment option addressed, and risk/benifits discussed.  Self-care instructions given.  I am recommending a multidisciplinary treatment plan to help this patient better manage their pain.                  1.  Pain Physical Therapy:  NO   Continue activity as tolerated at home.               2.  Pain Psychologist to address relaxation, behavioral change, coping style, and other factors important to improvement.  NO              3.  Diagnostic Studies:  None               4.  Medication Management:   Continue gabapentin to 900 mg three times daily, -[phone] (currently how he is taking). He notes increased pain symptoms if he misses afternoon dose. No side effects.    Baclofen 20 mg three times daily - MS clinic provider managing.               5.  Potential procedures:                           1. We may discuss spinal cord stimulator at future visit. He has decided against pursuing lumbar fusion at this time.                           2. He had L3-4 AIME with Dr. Camejo last on 9/21/23. He appreciates more than 50% improvement in symptoms that lasts on average for 3 months. Orders placed to schedule repeat injection now. Advised he may contact clinic to request orders for scheduling another repeat injection x 1 (at least 3 months apart from prior injection) in between 6 month follow up visits.               6.  Referrals: None               7.  Follow up with JUAN DIEGO Medeiros CNP in 6 months or sooner if needed.       Review of Electronic Chart: Today I have also reviewed available medical information in the patient's medical record at [facility] ([facility]) and Care Everywhere (if available), including relevant provider notes, laboratory work, and imaging.     Lucy Lazo DNP, JUAN DIEGO, AGNP-C  [facility]     -------------------------------------------------    Subjective:    Chief complaint:   Chief Complaint   Patient presents with    RECHECK       Interval history:  Linda Grover is a 68 year old male last seen on 11/17/23.  They are a patient of mine seen in follow up.     Recommendations/plan at the last visit included:              1.  Pain Physical Therapy:  NO   Continue activity as tolerated at home.               2.  Pain Psychologist to address relaxation, behavioral change, coping style, and other factors important to improvement.  NO              3.  Diagnostic Studies:  None               4.  Medication Management:   Continue gabapentin to 900 mg three times daily, -[phone] (currently how he is taking). He notes increased pain symptoms if he misses afternoon dose. No side effects. Refilled today for 90 day supply with one  additional refill.               5.  Potential procedures:                           1. We will discuss spinal cord stimulator at future visit. He has decided against pursuing lumbar fusion at this time.                           2. He had L3-4 AIME with Dr. Camejo last on 9/21/23. He appreciates more than 50% improvement in symptoms that lasts on average for 3 months. Advised he may repeat anytime after 12/21/23 and can contact clinic to request orders.               6.  Referrals:              7.  Follow up with JUAN DIEGO Medeiros CNP in 6 months around 5/17/23 or sooner if needed     Since his last visit, Linda Grover reports:  -Overall, pain is about the same as prior visit, no significant changes or new symptoms.   -His last LESI was 9/21/23, orders placed at last visit for repeat injection but he did not schedule.   -He is having recurrent anterior thigh pain/cramping.   -He is interested in repeat injection, leaves for [place] next week and would like to be able to get in for injection before then if possible.  -His tx plan is working.   -He reports reducing gabapentin dose to 900 mg in morning, then 600 mg in afternoon and evening when pain was not as severe.   -He denies side effects, notes enhanced pain benefit with higher dose.     Pain Information:   Pain rating: averages 4/10 on a 0-10 scale.      Interval history from last visit on 11/17/23:  -He had right L3-4 AIME with Dr. Camejo on 9/21/23.  -He reports LESI was okay, but he ended having complications afterwards.   -He states he lost urinary control and leg weakness, states Dr. Camejo told him the learner injected in the wrong area and caused these adverse effects.   -He reports he did get benefit from the injection.   -He continues to take gabapentin, current dosage ordered 900 mg TID. He does take [phone] and find helpful, advised he could trial taking this way or 900 TID.   -He states he has had period of time recently where he has missed  afternoon dose of gabapentin and noted changes in pain levels.   -His afternoon gabapentin dosage was increased at last visit from 600 mg to 900 mg, thinks this has been helpful.   -No gabapentin side effects.   -He states he has been dealing with LLE lower leg pain, states he saw ortho and they told him pain was associated to knee OA, had an injection.   -He states current plan is working, pain is stable overall.   Pain Information:              Pain rating: averages 2/10 on a 0-10 scale.        Interval history from last visit on 4/3/23:  -his pain is about the same/perhaps slightly worse than was at last visit.   -He had repeat lumbar AIME with Dr. Camejo on 1/19/23 that was helpful with his BLE radicular pain.  -He recently saw spine surgeon through [facility], states provider does not think surgery is advisable.   -He is very active, especially when in [place].   -They returned to  in early March.   -He continues gabapentin 900-600-900 mg.   -No side effects from medications.   -After LESI, he will appreciate near resolution with BLE pain for 2-3 weeks, then 50%+ improvement for anywhere from 2-4 months.   -The AIME really improves left leg symptoms, sometimes continues to have some RLE pain that stops in calf.   Pain Information:              Pain rating: averages 2/10 on a 0-10 scale.        Interval history from last visit on 12/19/22:  -His pain has improved since last visit.   -RLE pain was waking him up overnight, this has improved a lot.   -LLE is still problematic again, particularly with driving and sitting upright.   -Left leg pain increases when knee is bent at 90 degree angle.   -He denies side effects from gabapentin.   -He is interested in increased dosage to see if left leg symptoms improve.   -Dr. Louis is not in favor of changing baclofen dosage at this time.   -He is still considering lumbar fusion.   -Wife Gayathri present for this visit.   -Their son is living in [place] and on his way home for the  holidays, was delayed due to snow storm in [facility].         HPI/Interval history from last visit on 11/4/22:  Linda Grover is a 67 year old male presents with a chief complaint of BLE radicular pain.      The pain has been present for 3 years .    The pain is Mild Pain (2) in severity.    The pain is described as burning, tingling, aching and dull.   The pain is alleviated by meds, rest.    It is exacerbated by lifting.    Things not tried - increased dose gabapentin  The patient otherwise denies bowel or bladder incontinence, parasthesias, weakness, saddle anesthesia, unintentional weight loss, or fever/chills/sweats.      Linda Grover has been seen at a pain clinic in the past.  Procedures with Dr. Camejo (see below).      -He has h/o MS.  -He previously saw neurologist, asked if symptoms could be related to MS.  -He has LESIs, initially found helpful but not lasting benefit.   -When driving a car and legs are bent, leg symptoms worsen.   -Procedures done recently by Dr. Camejo:   10/20/22 with Nell   PROCEDURE(S) PERFORMED:  1.  Interlaminar right L3-L4 lumbar epidural steroid injection   9/1/22  PROCEDURE(S) PERFORMED:  1.  Interlaminar lumbar epidural steroid injection (L3/4)      -He states he is interested in possible SCS therapy.   -He has not had LSB in past.   -It seems he has not engaged in a lot of conservative therapy yet for symptoms.            Current Pain Treatments:     Medications:               Gabapentin [phone] (ordered 900 TID, advised he may trial taking either way)   Baclofen 20 mg TID (MS clinic managing)                Other therapies:               Injections - AIME with Dr. Camejo         Current MME: 0     Review of [place] Prescription Monitoring Program (): No concern for abuse or misuse of controlled medications based on this report. Reviewed - appears appropriate.      Annual Controlled Substance Agreement/UDS due date: N/A     Past pain   LESI  - last on  9/21/23      Medications:  Current Outpatient Medications   Medication Sig Dispense Refill    atorvastatin (LIPITOR) 40 MG tablet Take 40 mg by mouth At Bedtime      baclofen (LIORESAL) 20 MG tablet Take 1 tablet (20 mg) by mouth 3 times daily 270 tablet 3    cholecalciferol (VITAMIN D3) 5000 UNITS CAPS capsule Take 7,000 Units by mouth daily       gabapentin (NEURONTIN) 300 MG capsule Take 3 capsules (900 mg) by mouth 3 times daily 810 capsule 1    HydrOXYzine Pamoate (VISTARIL PO) Take 50 mg by mouth 3 times daily      lisinopril (PRINIVIL/ZESTRIL) 20 MG tablet Take 20 mg by mouth At Bedtime      Multiple Vitamins-Minerals (MULTIVITAMIN ADULT) TABS Take 1 tablet by mouth daily      omeprazole (PRILOSEC) 20 MG CR capsule Take 20 mg by mouth daily      traZODone (DESYREL) 50 MG tablet Take 1-2 tablets ( mg) by mouth At Bedtime 180 tablet 3       Medical History: any changes in medical history since they were last seen?  See chart for updates since LOV with me, though does not appear to have significant changes.       Objective:    Physical Exam:  Blood pressure 116/65, pulse 85, SpO2 99%.  Constitutional: Well developed, well nourished, appears stated age.  Gait: Intact   HEENT: Head atraumatic, normocephalic. Eyes without conjunctival injection or jaundice. Oropharynx clear. Neck supple. No obvious neck masses.  Skin: No rash, lesions, or petechiae of exposed skin.   Psychiatric/mental status: Alert, without lethargy or stupor. Speech fluent. Appropriate affect. Mood normal. Able to follow commands without difficulty.     Diagnostic Tests/Imaging/Labs:  BMP on 1/25/24 - GFR >60 (NL per lab ranges)    BILLING TIME DOCUMENTATION:   The total TIME spent on this patient on the date of the encounter/appointment was 15 minutes.      TOTAL TIME includes:   Time spent preparing to see the patient (reviewing records and tests)   Time spent face to face (or over the phone) with the patient   Time spent ordering tests,  medications, procedures and referrals   Time spent Referring and communicating with other healthcare professionals   Time spent documenting clinical information in Epic           Again, thank you for allowing me to participate in the care of your patient.      Sincerely,    JUAN DIEGO Medeiros CNP

## 2024-05-06 NOTE — PATIENT INSTRUCTIONS
Procedures:    Call to schedule your procedure: [phone] option #2    Right  Lumbar 3-4 interlaminar epidural steroid injection     Your pre-procedure instructions are below, please call our clinic if you have any questions.      Recommended Follow up:      Follow up in 6 months    Please call [phone] to schedule your clinic appointment if you don't already have an appointment scheduled.        To speak with a nurse, schedule/reschedule/cancel a clinic appointment, or request a medication refill call: [phone]    You can also reach us by [employer]: [email or web address]     Procedure Information:     Please call [phone] option #2 to schedule, reschedule, or cancel your procedure appointment.   Phones are answered Monday - Friday from 08:00 - 4:30pm.  Leave a voicemail with your name, birth date, and phone number if no one is available to take your call.        You no longer need to test for COVID- 19 prior to your procedure/surgery, unless your physician specifically requests that you test. If you experience COVID symptoms or have tested positive for COVID-19 within 14 days of your scheduled surgery or procedure, please update our office right away and your procedure may have to be postponed.       The procedure center staff will call you several days before the procedure to review important information that you will need to know for the day of the procedure.     Please contact the clinic if you have further questions about this information [phone].        Information related to Scheduling and Pre-Procedure Instructions:    If you must reschedule your procedure more than two times, you must follow up in clinic before rescheduling again.    Preparing for your procedure    CAUTION - FAILURE TO FOLLOW THESE PRE-PROCEDURE INSTRUCTIONS WILL RESULT IN YOUR PROCEDURE BEING RESCHEDULED.    Your Procedure: Right  Lumbar 3-4 interlaminar epidural steroid injection (Completed)              You must have a  take you home after your procedure. Transportation by taxi or para-transit is okay as long as you have a responsible adult accompany you. You must provide your 's full name and contact number at time of check in.     Fasting Protocol Please have nothing to eat or drink 1 hour prior to arrival.   Medications If you take any medications, DO NOT STOP. Take your medications as usual the day of your procedure with a sip of water AT LEAST 2 HOURS PRIOR TO ARRIVAL.    Antibiotics If you are currently taking antibiotics, you must complete the entire dose 7 days prior to your scheduled procedure. You must be clear of any signs or symptoms of infection. If you begin antibiotics, please contact our clinic for instructions.     Fever, Chills, or Rash If you experience a fever of higher than 100 degrees, chills, rash, or open wounds during the one week before your procedure, please call the clinic to see if you may proceed with your procedure.      Medication Hold List  **Patients under Cardiology/Neurology care should consult their provider prior to the pain procedure to verify pre-procedure medication instructions. The information below contains general guidelines.**    Blood Thinners If you are taking daily ASPIRIN, PLAVIX, OR OTHER BLOOD THINNERS SUCH AS COUMADIN/WARFARIN, we will need your prescribing doctor to sign a release permitting you to stop these medications. Once approved by your prescribing doctor - STOP ALL BLOOD THINNERS BASED ON THE TIME TABLE BELOW PRIOR TO YOUR PROCEDURE. If you have been instructed to stop WARFARIN(COUMADIN), you must have an INR lab drawn the day before your procedure. Your INR must be within normal limits before we can perform your injection. MEDICATIONS CAN BE RESTARTED AFTER YOUR PROCEDURE.    24 HOUR HOLD  Lovenox (enoxaparin)  Agrylin (Anagrelide)    3 DAY HOLD  Xarelto (rivaroxaban)    5 DAY HOLD  Coumadin (Warfarin)  Brilinta (ticagrelor) 7 DAY  HOLD  Anacin, Bufferin, Ecotrin, Excedrin, Aggrenox (Aspirin)  Pradexa (Dabigatran)  Elmiron (Pentosan)  Plavix (Clopidogrel Bisulfate)  Pletal (Cilostazol)    10 DAY HOLD  Effient (Prasugel)    14 DAY HOLD  Ticlid (ticlopidine)        Non-steroidal Anti-inflammatories (NSAIDs) DO NOT TAKE any non-steroidal anti-inflammatory medications (NSAIDs) listed on the table below. MEDICATIONS CAN BE RESTARTED AFTER YOUR PROCEDURE. Celebrex is OK to take and does not need to be discontinued.     Medications to stop:  1 DAY HOLD  Advil, Motrin (Ibuprofen)  Voltaren (Diclofenac)  Toradol (Ketorolac)    3 DAY HOLD  Arthrotec (diciofenac sodium/misoprostol)  Clinoril (Sulindac)  Indocin (Indomethacin)  Lodine (Etodolac)  Vicoprofen (Hydrocodone and Ibuprofen)  Apixaban (Eliquis)    4 DAY HOLD  Mobic (Meloxicam)  Naprosyn (Naproxen)   7 DAY HOLD  Aleve (Naproxen sodium)  Darvon compound (contains aspirin)  Norgesic Forte (contains aspirin)  Oruvall (Ketoprofen)  Percodan (contains aspirin)  Relafen (Nabumetone)  Salsalate  Trilisate  Vitamin E (more than 400 mg per day)  Any medication containing aspirin    14 DAY HOLD  Daypro (Oxaprozin)  Feldene (Piroxicam)            To speak with a nurse, schedule/reschedule/cancel a clinic appointment, or request a medication refill call: [phone]    You can also reach us by [employer]: [email or web address]

## 2024-05-06 NOTE — TELEPHONE ENCOUNTER
Called patient to schedule procedure with Dr. Camejo    Date of Procedure: 5/9/24    Arrival time given: Yes: Arrival Time 2:30pm       Procedure Location: [facility]     Verified Location with Patient:  Yes  Address provided to the patient     Pre-op H&P Required:  No: Local Anesthesia        Post-Op/Follow Up Appt:  Not Indicated in Request      Informed patient they will need a  to drive them home:  Yes    Patients : Wife (Gayathri)     Patient is aware that pre-op RN from the procedure center will call 2-3 days prior to scheduled procedure to confirm arrival time and review any instructions:  Yes       Additional Comments: N/A        Vania Ca MA on 5/6/2024 at 12:15 PM      P: [phone]*

## 2024-05-06 NOTE — NURSING NOTE
RN read through the instructions with the patient for the recommended procedure: Right  Lumbar 3-4 interlaminar epidural steroid injection (Completed)   Patient verbalized understanding to holding appropriate medication per protocol and was agreeable to NPO policy and needing a .    Anticoagulant: None reported    Recommended Follow Up:  6 months    Ariella Whitt RN

## 2024-05-09 ENCOUNTER — ANCILLARY PROCEDURE (OUTPATIENT)
Dept: RADIOLOGY | Facility: AMBULATORY SURGERY CENTER | Age: 69
End: 2024-05-09
Attending: ANESTHESIOLOGY
Payer: COMMERCIAL

## 2024-05-09 ENCOUNTER — HOSPITAL ENCOUNTER (OUTPATIENT)
Facility: AMBULATORY SURGERY CENTER | Age: 69
Discharge: HOME | End: 2024-05-09
Attending: ANESTHESIOLOGY | Admitting: ANESTHESIOLOGY
Payer: COMMERCIAL

## 2024-05-09 VITALS
OXYGEN SATURATION: 96 % | RESPIRATION RATE: 16 BRPM | DIASTOLIC BLOOD PRESSURE: 71 MMHG | SYSTOLIC BLOOD PRESSURE: 144 MMHG

## 2024-05-09 DIAGNOSIS — M54.16 LUMBAR RADICULOPATHY: ICD-10-CM

## 2024-05-09 PROCEDURE — 62323 NJX INTERLAMINAR LMBR/SAC: CPT

## 2024-05-09 RX ORDER — BUPIVACAINE HYDROCHLORIDE 2.5 MG/ML
INJECTION, SOLUTION EPIDURAL; INFILTRATION; INTRACAUDAL PRN
Status: DISCONTINUED | OUTPATIENT
Start: 2024-05-09 | End: 2024-05-09 | Stop reason: HOSPADM

## 2024-05-09 RX ORDER — IOPAMIDOL 408 MG/ML
INJECTION, SOLUTION INTRATHECAL PRN
Status: DISCONTINUED | OUTPATIENT
Start: 2024-05-09 | End: 2024-05-09 | Stop reason: HOSPADM

## 2024-05-09 RX ORDER — METHYLPREDNISOLONE ACETATE 40 MG/ML
INJECTION, SUSPENSION INTRA-ARTICULAR; INTRALESIONAL; INTRAMUSCULAR; SOFT TISSUE PRN
Status: DISCONTINUED | OUTPATIENT
Start: 2024-05-09 | End: 2024-05-09 | Stop reason: HOSPADM

## 2024-05-09 RX ORDER — LIDOCAINE HYDROCHLORIDE 10 MG/ML
INJECTION, SOLUTION EPIDURAL; INFILTRATION; INTRACAUDAL; PERINEURAL PRN
Status: DISCONTINUED | OUTPATIENT
Start: 2024-05-09 | End: 2024-05-09 | Stop reason: HOSPADM

## 2024-05-09 NOTE — DISCHARGE INSTRUCTIONS
Home Care Instructions after an Epidural Steroid Pain Injection    A lumbar epidural steroid injection delivers steroid medication directly into the area that may be causing your lower back pain and/or leg pain. A cervical or thoracic epidural steroid injection delivers steroids into the epidural space surrounding spinal nerve roots to help relieve pain in the upper spine/neck.    Activity  -Rest today  -Do not work today  -Resume normal activity tomorrow  -DO NOT shower for 24 hours  -DO NOT remove bandaid for 24 hours    Pain  -You may experience soreness at the injection site for one or two days  -You may use an ice pack for 20 minutes every 2 hours for the first 24 hours  -You may use a heating pad after the first 24 hours  -You may use Tylenol (acetaminophen) every 4 hours or other pain medicines as     directed by your physician    You may experience numbness radiating into your legs or arms (depending on the procedure location). This numbness may last several hours. Until sensation returns to normal; please use caution in walking, climbing stairs, and stepping out of your vehicle, etc.    Common side effects of steroids:  Not everyone will experience corticosteroid side effects. If side effects are experienced, they will gradually subside in the 7-10 day period following an injection. Most common side effects include:  -Flushed face and/or chest  -Feeling of warmth, particularly in the face but could be an overall feeling of warmth  -Increased blood sugar in diabetic patients  -Menstrual irregularities my occur. If taking hormone-based birth control an alternate method of birth control is recommended  -Sleep disturbances and/or mood swings are possible  -Leg cramps    Please contact us if you have:  -Severe pain  -Fever more than 101.5 degrees Fahrenheit  -Signs of infection at the injection site (redness, swelling, or drainage)    FOR PAIN CENTER PATIENTS:  If you have questions, please contact the Pain  Clinic at [phone] Option #1 between the hours of 7:00 am and 3:00 pm Monday through Friday. After office hours you can contact the on call provider by dialing [phone]. If you need immediate attention, we recommend that you go to a hospital emergency room or dial [phone].

## 2024-05-10 ENCOUNTER — TELEPHONE (OUTPATIENT)
Dept: NEUROLOGY | Facility: CLINIC | Age: 69
End: 2024-05-10
Payer: COMMERCIAL

## 2024-05-10 NOTE — OP NOTE
Patient: Linda Grover Age: 68 year old   MRN: [id number] Attending: Dr. Camejo          PAIN MEDICINE CLINIC PROCEDURE NOTE     ATTENDING CLINICIAN:    Lisseth Camejo MD     ASSISTANT CLINICIAN:  ALESHA Rajan      PREPROCEDURE DIAGNOSES:  1.  Lumbar radiculopathy  2.  Chronic low back pain         PROCEDURE(S) PERFORMED:  1.  Interlaminar right L3-L4 lumbar epidural steroid injection   2.  Fluoroscopic guidance for the above-named procedure(s)        ANESTHESIA:  Local.     INDICATIONS:  Linda Grover is a 68 year old male with a history of  chronic low back pain secondary to bilateral lumbosacral radiculopathy (right greater than the left).  Patient's most recent MRI reviewed.  Patient has both L3 and S1 radiculopathy.  His symptoms on the right side restricted above-knee.  On the left side he said that sometimes his entire left lower extremity gives out.  EMG showed L3 radiculopathy.  Therefore decision is made for right L3-L4 epidural steroid injection. He had good pain relief with prior injections.  The patient stated that the patient was in their usual state of health and denied recent anticoagulant use or recent infections.  Therefore, the plan is to perform above mentioned procedures.      Procedure Details:  The patient was met in the procedure room, where the patient was identified by name, medical record number and date of birth.  All of the patient s last minute questions were answered. Written informed consent was obtained and saved in the electronic medical record, after the risks, benefits, and alternatives were discussed with the patient.       A formal time-out procedure was performed, as per protocol, including patient name, title of procedure, and site of procedure, and all in the room concurred.  Routine monitors were applied.       The patient was placed in the prone position on the procedure room table.  All pressure points were checked and comfortably padded.  Routine monitors were  placed.  Vital signs were stable.     A chlorhexidine prep was completed followed by sterile draping per standard procedure.      The AP fluoroscopic view was optimized for approach at right L3-L4 interspace.  The skin over the interspace was infiltrated with 4-5 mL of 1% Lidocaine using a 25 gauge, 1.5 inch needle.  A 20-gauge 3-1/2 inch Tuohy needle was advanced under fluoroscopic guidance with right paramedial approach until it touched lamina. Mutiple AP and lateral fluoroscopic images at this time  are taken as Tuohy needle was advanced to the epidural space. The epidural space was identified, without evidence of blood, cerebrospinal fluid, or parasthesia throughout. Needle tip placement within the epidural space was further confirmed with 1-2 mL of nonionic contrast agent, with the epidural space visualized in the AP and lateral fluoroscopic view(s) with appropriate spread of the agent with fat vacuolization and no intravascular or intrathecal spread noted. Next, 8 mL of a treatment solution containing 2 mL of preservative free 0.25% bupivacaine, 1 mL of Depo-Medrol 40 mg/mL and 5 mL preservative free normal saline was administered slowly. The needle was withdrawn.        Light pressure was held at the puncture site(s) to prevent ecchymosis and oozing.  The patient's skin was cleansed, and hemostasis was confirmed.  Band-aids were applied to the needle injection site(s).       Condition:     The patient remained awake and alert throughout the procedure.  The patient tolerated the procedure well and was monitored for approximately 15 minutes afterward in the post procedure area.  There were no immediate post procedure complications noted.  The patient was then discharged to home as per protocol.       Pre-procedure pain score: 5/10  Post-procedure pain score: 0/10

## 2024-05-10 NOTE — TELEPHONE ENCOUNTER
Left Voicemail (1st Attempt) and Sent Mychart (1st Attempt) for the patient to call back and schedule the following:    Appointment type: Reschedule June Appt  Provider: Dr. Louis  Return date: next avail  Specialty phone number: [phone]  Additional appointment(s) needed: N/A  Additonal Notes: Reschedules

## 2024-06-26 ENCOUNTER — OFFICE VISIT (OUTPATIENT)
Dept: NEUROLOGY | Facility: CLINIC | Age: 69
End: 2024-06-26
Attending: PSYCHIATRY & NEUROLOGY
Payer: COMMERCIAL

## 2024-06-26 VITALS
BODY MASS INDEX: 35.06 KG/M2 | HEART RATE: 74 BPM | OXYGEN SATURATION: 93 % | WEIGHT: 251.4 LBS | SYSTOLIC BLOOD PRESSURE: 133 MMHG | DIASTOLIC BLOOD PRESSURE: 73 MMHG

## 2024-06-26 DIAGNOSIS — M79.2 NEUROPATHIC PAIN: ICD-10-CM

## 2024-06-26 DIAGNOSIS — G35 MS (MULTIPLE SCLEROSIS) (H): Primary | ICD-10-CM

## 2024-06-26 DIAGNOSIS — M54.16 LUMBAR RADICULOPATHY: ICD-10-CM

## 2024-06-26 DIAGNOSIS — R25.2 SPASTICITY: ICD-10-CM

## 2024-06-26 PROCEDURE — G0463 HOSPITAL OUTPT CLINIC VISIT: HCPCS | Performed by: PSYCHIATRY & NEUROLOGY

## 2024-06-26 PROCEDURE — 99214 OFFICE O/P EST MOD 30 MIN: CPT | Performed by: PSYCHIATRY & NEUROLOGY

## 2024-06-26 RX ORDER — BACLOFEN 20 MG/1
TABLET ORAL
Qty: 300 TABLET | Refills: 3 | Status: SHIPPED | OUTPATIENT
Start: 2024-06-26

## 2024-06-26 RX ORDER — GABAPENTIN 300 MG/1
900 CAPSULE ORAL DAILY PRN
Qty: 60 CAPSULE | Refills: 1 | Status: SHIPPED | OUTPATIENT
Start: 2024-06-26

## 2024-06-26 ASSESSMENT — PAIN SCALES - GENERAL: PAINLEVEL: NO PAIN (0)

## 2024-06-26 NOTE — PROGRESS NOTES
Date of Service: 6/26/2024    [facility] Neurology   MS Clinic Follow-up     Subjective: 68-year-old man with a history of hypertension, prostate cancer status post excision in October 2020, psoriasis, and multiple sclerosis who presents for evaluation.    He is accompanied by his wife, Gayathri, but provides history independently.    Overall he reports that he is doing pretty well.  He has not noticed any discrete new symptoms.  He does continue to struggle with tendency for of his hands to get cold.  He has numbness and tingling and stiffness in his hands.  His legs also get stiff.  He has a tendency to catch his right foot on uneven ground.    He did struggle with muscle tightness.  He travel to [place].  They did a lot of driving.  After a couple of long drives when he would get up he was in significant pain in the entire right leg.  However he does demonstrate the right anterior aspect of the leg.  He states that this pain is new and is distinct from what he has experienced in the past.  When this occurred he was not able to sit down, he could only lay down.  He ended up taking extra doses of baclofen and gabapentin with relief.    In general, he is taking baclofen 20 mg 3 times per day.  He is taking gabapentin 900 mg 3 times per day.  He seems to be tolerating this well.  He is working with pain management and has received epidural steroid injections which have been helpful for the shooting pains.    He was able to hike up to 12 miles.  He has had 3-4 minor falls that have predominantly occurred on uneven ground.    Disease onset: age 55, paresthesias in UE with hand weakness, was on enbrel for psoriasis  Last relapse: unk    DMD hx:   Copaxone 2010 - 2016, d/c progression of disease though not clear what clinical symptoms were present  tysabri 2016 - 2017, d/c JCV seroconversion   lemtrada 2017 & 11/2018, now undergoing post treatment monitoring       No Known Allergies    Current Outpatient Medications    Medication Sig Dispense Refill    atorvastatin (LIPITOR) 40 MG tablet Take 40 mg by mouth At Bedtime      baclofen (LIORESAL) 20 MG tablet Take 1 tablet (20 mg) by mouth 3 times daily 270 tablet 3    cholecalciferol (VITAMIN D3) 5000 UNITS CAPS capsule Take 7,000 Units by mouth daily       gabapentin (NEURONTIN) 300 MG capsule Take 3 capsules (900 mg) by mouth 3 times daily 810 capsule 1    HydrOXYzine Pamoate (VISTARIL PO) Take 50 mg by mouth 3 times daily      lisinopril (PRINIVIL/ZESTRIL) 20 MG tablet Take 20 mg by mouth At Bedtime      Multiple Vitamins-Minerals (MULTIVITAMIN ADULT) TABS Take 1 tablet by mouth daily      omeprazole (PRILOSEC) 20 MG CR capsule Take 20 mg by mouth daily      traZODone (DESYREL) 50 MG tablet Take 1-2 tablets ( mg) by mouth At Bedtime 180 tablet 3     No current facility-administered medications for this visit.        Past medical, surgical, social and family history was personally reviewed. Pertinent details noted above.     Physical Examination:   /73 (BP Location: Left arm, Patient Position: Sitting, Cuff Size: Adult Regular)   Pulse 74   Wt 114 kg (251 lb 6.4 oz)   SpO2 93%   BMI 35.06 kg/m      General: no acute distress  Cranial nerves:   VFFC  PERRL w/no RAPD  EOM full w/no CHARLIE   Face symmetric  Hearing intact  No dysarthria   Motor:   Tone is mildly increased in the LE  Bulk is normal     R L  Deltoid  5 5  Biceps  5 5  Triceps 5 5  Wrist ext 5 5  Finger ext 5 5  Finger abd 5 5    Hip flexion 5- 5  Knee flexion 5 5  Knee ext 5 5  Ankle d/f 5 5    Reflexes: absent in the right patella, normal on the left, babinski present on the left  Sensory: vibration is mildly reduced in the toes, JPS intact  Romberg is absent  Coordination: no ataxia or dysmetria  Gait: slightly wide based with good stride, tandem gait is intact, stand on one foot for 4-5 sec    Tests/Imaging:   CSF >5 ocb, IgG ind 1.07    MAGDALENA virus Ab reported positive  Vitamin D 70    MRI brain   2015 -  few periventricular lesions, deep white matter ovoid lesion in the left periatrial region, multiple non specific punctate white matter lesions, gd-   4/2021 - no new lesions  6/2023 - no new lesions    MRI cervical spine   2015 - dorsal cord lesion at c1, gd-   4/2021 - no new lesions  6/2023 - no new lesions    MRI thoracic spine   2015 - negative for ms lesions, gd-   4/2021 - no new lesions  6/2023 - no new lesions    Assessment: 68 year old man with multiple sclerosis who is s/p lemtrada who remains clinically stable.  He is due for radiologic surveillance in 1 year.    The right leg pain could have been worsening of a radiculopathy, though he states that this pain was distinct from his typical radicular pain.  The alternative is that the prolonged sitting causes an increase in spasticity related to multiple sclerosis.  Regardless, he did get benefit from gabapentin and baclofen.  He was encouraged to stretch prior to long car rides in the future.  I gave him the liberty to take 1 additional gabapentin and baclofen as needed should this occur again.    He was encouraged to keep up with his back exercises.    Plan:     -Baclofen 20 mg tid +20 mg daily as needed  -Gabapentin as per pain medicine, though dose of 900 mg daily as needed was provided today  - MRI in 1 year  - Follow-up after MRI    Note was completed with the assistance of Dragon Fluency software which can often result in accidental word substitutions.     A total of 30 minutes on the date of service were spent in the care of this patient.   Farzana Louis MD on 6/26/2024 at 11:43 AM

## 2024-06-26 NOTE — PATIENT INSTRUCTIONS
Your exam is stable     Continue baclofen - can take 1 extra as needed  Same with gabapentin     Keep up with the lower back exercises    Mri in 1 year  Brain to be done on 7T MRI   Can do spinal cord at [facility]     Follow up in 1 year

## 2024-06-26 NOTE — NURSING NOTE
Chief Complaint   Patient presents with    MS    RECHECK     Ms follow up      Vitals were taken and medications were reconciled.   You Bernardo, EMT  11:35 AM

## 2024-06-26 NOTE — LETTER
6/26/2024       RE: Linda Grover  [address]  [place] [address]     Dear Colleague,    Thank you for referring your patient, Linda Grover, to the [facility] [place] [facility]. Please see a copy of my visit note below.    Date of Service: 6/26/2024    [facility] Neurology   MS Clinic Follow-up     Subjective: 68-year-old man with a history of hypertension, prostate cancer status post excision in October 2020, psoriasis, and multiple sclerosis who presents for evaluation.    He is accompanied by his wife, Gayathri, but provides history independently.    Overall he reports that he is doing pretty well.  He has not noticed any discrete new symptoms.  He does continue to struggle with tendency for of his hands to get cold.  He has numbness and tingling and stiffness in his hands.  His legs also get stiff.  He has a tendency to catch his right foot on uneven ground.    He did struggle with muscle tightness.  He travel to [place].  They did a lot of driving.  After a couple of long drives when he would get up he was in significant pain in the entire right leg.  However he does demonstrate the right anterior aspect of the leg.  He states that this pain is new and is distinct from what he has experienced in the past.  When this occurred he was not able to sit down, he could only lay down.  He ended up taking extra doses of baclofen and gabapentin with relief.    In general, he is taking baclofen 20 mg 3 times per day.  He is taking gabapentin 900 mg 3 times per day.  He seems to be tolerating this well.  He is working with pain management and has received epidural steroid injections which have been helpful for the shooting pains.    He was able to hike up to 12 miles.  He has had 3-4 minor falls that have predominantly occurred on uneven ground.    Disease onset: age 55, paresthesias in UE with hand weakness, was on  enbrel for psoriasis  Last relapse: unk    DMD hx:   Copaxone 2010 - 2016, d/c progression of disease though not clear what clinical symptoms were present  tysabri 2016 - 2017, d/c JCV seroconversion   lemtrada 2017 & 11/2018, now undergoing post treatment monitoring       No Known Allergies    Current Outpatient Medications   Medication Sig Dispense Refill    atorvastatin (LIPITOR) 40 MG tablet Take 40 mg by mouth At Bedtime      baclofen (LIORESAL) 20 MG tablet Take 1 tablet (20 mg) by mouth 3 times daily 270 tablet 3    cholecalciferol (VITAMIN D3) 5000 UNITS CAPS capsule Take 7,000 Units by mouth daily       gabapentin (NEURONTIN) 300 MG capsule Take 3 capsules (900 mg) by mouth 3 times daily 810 capsule 1    HydrOXYzine Pamoate (VISTARIL PO) Take 50 mg by mouth 3 times daily      lisinopril (PRINIVIL/ZESTRIL) 20 MG tablet Take 20 mg by mouth At Bedtime      Multiple Vitamins-Minerals (MULTIVITAMIN ADULT) TABS Take 1 tablet by mouth daily      omeprazole (PRILOSEC) 20 MG CR capsule Take 20 mg by mouth daily      traZODone (DESYREL) 50 MG tablet Take 1-2 tablets ( mg) by mouth At Bedtime 180 tablet 3     No current facility-administered medications for this visit.        Past medical, surgical, social and family history was personally reviewed. Pertinent details noted above.     Physical Examination:   /73 (BP Location: Left arm, Patient Position: Sitting, Cuff Size: Adult Regular)   Pulse 74   Wt 114 kg (251 lb 6.4 oz)   SpO2 93%   BMI 35.06 kg/m      General: no acute distress  Cranial nerves:   VFFC  PERRL w/no RAPD  EOM full w/no CHARLIE   Face symmetric  Hearing intact  No dysarthria   Motor:   Tone is mildly increased in the LE  Bulk is normal     R L  Deltoid  5 5  Biceps  5 5  Triceps 5 5  Wrist ext 5 5  Finger ext 5 5  Finger abd 5 5    Hip flexion 5- 5  Knee flexion 5 5  Knee ext 5 5  Ankle d/f 5 5    Reflexes: absent in the right patella, normal on the left, babinski present on the  left  Sensory: vibration is mildly reduced in the toes, JPS intact  Romberg is absent  Coordination: no ataxia or dysmetria  Gait: slightly wide based with good stride, tandem gait is intact, stand on one foot for 4-5 sec    Tests/Imaging:   CSF >5 ocb, IgG ind 1.07    MAGDALENA virus Ab reported positive  Vitamin D 70    MRI brain   2015 - few periventricular lesions, deep white matter ovoid lesion in the left periatrial region, multiple non specific punctate white matter lesions, gd-   4/2021 - no new lesions  6/2023 - no new lesions    MRI cervical spine   2015 - dorsal cord lesion at c1, gd-   4/2021 - no new lesions  6/2023 - no new lesions    MRI thoracic spine   2015 - negative for ms lesions, gd-   4/2021 - no new lesions  6/2023 - no new lesions    Assessment: 68 year old man with multiple sclerosis who is s/p lemtrada who remains clinically stable.  He is due for radiologic surveillance in 1 year.    The right leg pain could have been worsening of a radiculopathy, though he states that this pain was distinct from his typical radicular pain.  The alternative is that the prolonged sitting causes an increase in spasticity related to multiple sclerosis.  Regardless, he did get benefit from gabapentin and baclofen.  He was encouraged to stretch prior to long car rides in the future.  I gave him the liberty to take 1 additional gabapentin and baclofen as needed should this occur again.    He was encouraged to keep up with his back exercises.    Plan:     -Baclofen 20 mg tid +20 mg daily as needed  -Gabapentin as per pain medicine, though dose of 900 mg daily as needed was provided today  - MRI in 1 year  - Follow-up after MRI    Note was completed with the assistance of Dragon Fluency software which can often result in accidental word substitutions.     A total of 30 minutes on the date of service were spent in the care of this patient.   Farzana Louis MD on 6/26/2024 at 11:43 AM              Again, thank you  for allowing me to participate in the care of your patient.      Sincerely,    Farzana Louis MD

## 2024-07-01 DIAGNOSIS — F51.01 PRIMARY INSOMNIA: ICD-10-CM

## 2024-07-01 RX ORDER — TRAZODONE HYDROCHLORIDE 50 MG/1
50-100 TABLET, FILM COATED ORAL AT BEDTIME
Qty: 180 TABLET | Refills: 3 | Status: SHIPPED | OUTPATIENT
Start: 2024-07-01

## 2024-07-01 NOTE — TELEPHONE ENCOUNTER
Received refill request for trazodone from [facility]; Patient was last seen in June 2024 and has follow up appointment in June 2025 with Dr Louis. Refilled per MS refill protocol.    Felisa Daly RN

## 2024-09-22 ENCOUNTER — MYC MEDICAL ADVICE (OUTPATIENT)
Dept: NEUROLOGY | Facility: CLINIC | Age: 69
End: 2024-09-22
Payer: COMMERCIAL

## 2024-09-22 DIAGNOSIS — G35 MULTIPLE SCLEROSIS (H): ICD-10-CM

## 2024-09-22 DIAGNOSIS — M79.2 NEUROPATHIC PAIN: Primary | ICD-10-CM

## 2024-09-23 DIAGNOSIS — G35 MS (MULTIPLE SCLEROSIS) (H): ICD-10-CM

## 2024-09-23 DIAGNOSIS — M79.2 NEUROPATHIC PAIN: ICD-10-CM

## 2024-09-23 NOTE — TELEPHONE ENCOUNTER
Refill request    Message from the patient:  I understand that [facility] is seeking your approval to fill my Gabapentin 300mg reorder. Could you please contact them ?   I am nearly completely out and we leave for a trip to [facility] in 4 days.   I did send a note to Dr. Louis as well asking for an emergency prescription for Gabapentin at [facility].  Hope that does not mess things up with insurance. Arg!!!  Mostly worried that I keep this right leg pain in check during our trip.    Medication: gabapentin (NEURONTIN) 300 MG capsule    Sig: Take 3 capsules (900 mg) by mouth daily as needed for neuropathic pain     Dispensed: 60  Refills: 1    Last prescribed to patient: 6/26/24    Last clinic appointment: 5/6/24  Next clinic appointment: none listed    Preferred pharmacy:    [facility] - [place], [place] - [address]    Refill request routed to the provider to review.     9/24/24-patient message: Any chance you can change that to [facility]? I am leaving for [facility] in 2 days. There is not enough time for [facility] to get the Prescription to me!

## 2024-09-24 RX ORDER — GABAPENTIN 300 MG/1
900 CAPSULE ORAL 3 TIMES DAILY
Qty: 810 CAPSULE | Refills: 0 | Status: SHIPPED | OUTPATIENT
Start: 2024-09-24

## 2024-09-24 RX ORDER — GABAPENTIN 300 MG/1
900 CAPSULE ORAL 3 TIMES DAILY
Qty: 126 CAPSULE | Refills: 0 | Status: SHIPPED | OUTPATIENT
Start: 2024-09-24 | End: 2024-10-08

## 2024-09-24 NOTE — TELEPHONE ENCOUNTER
Pt requesting temporary gabapentin prescription, as he is leaving for [facility] in 2 days. Gabapentin has been managed by pain clinic. Request routed to Dr Louis.     Last visit 6/26/24  Follow-up with Dr Louis scheduled for 6/20/25    Felisa Daly RN

## 2024-09-24 NOTE — TELEPHONE ENCOUNTER
Chart reviewed - Request appears appropriate. Refilled for 90 day supply.     Lucy Lazo DNP, APRN, AGNP-C  [facility]

## 2025-01-20 DIAGNOSIS — M79.2 NEUROPATHIC PAIN: ICD-10-CM

## 2025-01-20 DIAGNOSIS — G35 MS (MULTIPLE SCLEROSIS) (H): ICD-10-CM

## 2025-01-20 RX ORDER — GABAPENTIN 300 MG/1
900 CAPSULE ORAL 3 TIMES DAILY
Qty: 810 CAPSULE | Refills: 0 | Status: SHIPPED | OUTPATIENT
Start: 2025-01-20

## 2025-01-20 NOTE — TELEPHONE ENCOUNTER
Refill request    Message from the patient:  Good cold chilly Afternoon,    In packing for our journey to [place], I quickly discovered that my Gabapentin supply is nearly depleted. It also appears that my pharmacy at [facility] requires a new prescription from you.   Would it be possible to secure a 90 day supply as soon as possible?   We leave on Saturday for sun and warmth.   Thank You  Linda Grover     Medication: gabapentin (NEURONTIN) 300 MG capsule     Sig: Take 3 capsules (900 mg) by mouth 3 times daily.     Dispensed: 810  Refills: 0    Last prescribed to patient: 9/24/24     Last clinic appointment: 5/6/24  Next clinic appointment: none listed    Preferred pharmacy:    [id number] - [place], [place] - [address]      Refill request routed to the provider to review.

## 2025-03-02 ENCOUNTER — HEALTH MAINTENANCE LETTER (OUTPATIENT)
Age: 70
End: 2025-03-02

## 2025-05-27 DIAGNOSIS — F51.01 PRIMARY INSOMNIA: ICD-10-CM

## 2025-05-28 ENCOUNTER — RESULTS FOLLOW-UP (OUTPATIENT)
Dept: NEUROLOGY | Facility: CLINIC | Age: 70
End: 2025-05-28

## 2025-05-28 RX ORDER — TRAZODONE HYDROCHLORIDE 50 MG/1
50-100 TABLET ORAL AT BEDTIME
Qty: 180 TABLET | Refills: 0 | Status: SHIPPED | OUTPATIENT
Start: 2025-05-28

## 2025-05-28 NOTE — TELEPHONE ENCOUNTER
Received refill request for trazodone from [facility]; Patient was last seen in June 2024 and has follow up appointment in June 2025 with Dr Louis. Refilled per MS refill protocol.    Felisa Daly RN

## 2025-06-16 ENCOUNTER — HOSPITAL ENCOUNTER (OUTPATIENT)
Dept: MRI IMAGING | Facility: CLINIC | Age: 70
Discharge: HOME | End: 2025-06-16
Attending: PSYCHIATRY & NEUROLOGY | Admitting: PSYCHIATRY & NEUROLOGY
Payer: COMMERCIAL

## 2025-06-16 DIAGNOSIS — G35 MS (MULTIPLE SCLEROSIS) (H): ICD-10-CM

## 2025-06-16 PROCEDURE — A9585 GADOBUTROL INJECTION: HCPCS | Performed by: PSYCHIATRY & NEUROLOGY

## 2025-06-16 PROCEDURE — 72157 MRI CHEST SPINE W/O & W/DYE: CPT

## 2025-06-16 PROCEDURE — 255N000002 HC RX 255 OP 636: Performed by: PSYCHIATRY & NEUROLOGY

## 2025-06-16 PROCEDURE — 72156 MRI NECK SPINE W/O & W/DYE: CPT

## 2025-06-16 RX ORDER — GADOBUTROL 604.72 MG/ML
11 INJECTION INTRAVENOUS ONCE
Status: COMPLETED | OUTPATIENT
Start: 2025-06-16 | End: 2025-06-16

## 2025-06-16 RX ADMIN — GADOBUTROL 11 ML: 604.72 INJECTION INTRAVENOUS at 11:27

## 2025-07-02 DIAGNOSIS — M79.2 NEUROPATHIC PAIN: ICD-10-CM

## 2025-07-02 DIAGNOSIS — G35 MS (MULTIPLE SCLEROSIS) (H): ICD-10-CM

## 2025-07-02 NOTE — TELEPHONE ENCOUNTER
Refill request      Medication:  Gabapentin 300 MG Oral Capsule     Sig: TAKE 3 CAPSULES BY MOUTH THREE TIMES DAILY     Dispensed: 810 capsules  Refills: 0    Last prescribed to patient: 1/20/25- sold to patient on 1/22/25 per       Last clinic appointment: 5/6/24  Next clinic appointment: Not scheduled- will send message to Clinic Coordinators to schedule a follow up as patient is overdue    Last Drug Screen Collected: Not on file  Controlled Substance Agreement signed: Not on file      Preferred pharmacy:    [facility] - [facility] [address]     Refill request routed to the provider to review.     Ariella Whitt RN

## 2025-07-03 RX ORDER — GABAPENTIN 300 MG/1
900 CAPSULE ORAL 3 TIMES DAILY
Qty: 810 CAPSULE | Refills: 0 | Status: SHIPPED | OUTPATIENT
Start: 2025-07-03

## 2025-07-03 NOTE — TELEPHONE ENCOUNTER
Request appears appropriate, refill approved for 90 day supply +0 additional fill(s).     Lucy Lazo DNP, APRN, AGNP-C  [facility]

## 2025-07-14 ENCOUNTER — LAB (OUTPATIENT)
Dept: LAB | Facility: CLINIC | Age: 70
End: 2025-07-14
Payer: COMMERCIAL

## 2025-07-14 DIAGNOSIS — E55.9 VITAMIN D DEFICIENCY: ICD-10-CM

## 2025-07-14 PROCEDURE — 82306 VITAMIN D 25 HYDROXY: CPT

## 2025-07-14 PROCEDURE — 36415 COLL VENOUS BLD VENIPUNCTURE: CPT

## 2025-07-15 LAB — VIT D+METAB SERPL-MCNC: 51 NG/ML (ref 20–50)

## 2025-08-05 ENCOUNTER — OFFICE VISIT (OUTPATIENT)
Dept: ANESTHESIOLOGY | Facility: CLINIC | Age: 70
End: 2025-08-05
Payer: COMMERCIAL

## 2025-08-05 VITALS
SYSTOLIC BLOOD PRESSURE: 110 MMHG | OXYGEN SATURATION: 95 % | HEART RATE: 65 BPM | DIASTOLIC BLOOD PRESSURE: 54 MMHG | RESPIRATION RATE: 16 BRPM

## 2025-08-05 DIAGNOSIS — Z79.899 ENCOUNTER FOR MEDICATION MANAGEMENT: ICD-10-CM

## 2025-08-05 DIAGNOSIS — G35 MS (MULTIPLE SCLEROSIS) (H): ICD-10-CM

## 2025-08-05 DIAGNOSIS — M54.16 LUMBAR RADICULOPATHY: Primary | ICD-10-CM

## 2025-08-05 DIAGNOSIS — M79.2 NEUROPATHIC PAIN: ICD-10-CM

## 2025-08-05 PROCEDURE — 99214 OFFICE O/P EST MOD 30 MIN: CPT

## 2025-08-05 PROCEDURE — 3078F DIAST BP <80 MM HG: CPT

## 2025-08-05 PROCEDURE — 3074F SYST BP LT 130 MM HG: CPT

## 2025-08-05 RX ORDER — GABAPENTIN 300 MG/1
900 CAPSULE ORAL 3 TIMES DAILY
Qty: 810 CAPSULE | Refills: 1 | Status: SHIPPED | OUTPATIENT
Start: 2025-08-05

## 2025-08-05 RX ORDER — METFORMIN HYDROCHLORIDE 500 MG/1
500 TABLET, EXTENDED RELEASE ORAL
COMMUNITY
Start: 2025-05-08

## 2025-08-05 ASSESSMENT — PAIN SCALES - PAIN ENJOYMENT GENERAL ACTIVITY SCALE (PEG)
INTERFERED_ENJOYMENT_LIFE: 3
PEG_TOTALSCORE: 3
AVG_PAIN_PASTWEEK: 3
INTERFERED_GENERAL_ACTIVITY: 3

## 2025-08-06 ENCOUNTER — TELEPHONE (OUTPATIENT)
Dept: ANESTHESIOLOGY | Facility: CLINIC | Age: 70
End: 2025-08-06
Payer: COMMERCIAL

## 2025-08-07 ENCOUNTER — TELEPHONE (OUTPATIENT)
Dept: ANESTHESIOLOGY | Facility: CLINIC | Age: 70
End: 2025-08-07
Payer: COMMERCIAL

## 2025-08-20 DIAGNOSIS — F51.01 PRIMARY INSOMNIA: ICD-10-CM

## 2025-08-20 RX ORDER — TRAZODONE HYDROCHLORIDE 50 MG/1
50-100 TABLET ORAL AT BEDTIME
Qty: 180 TABLET | Refills: 3 | Status: SHIPPED | OUTPATIENT
Start: 2025-08-20

## 2025-08-28 ENCOUNTER — ANCILLARY PROCEDURE (OUTPATIENT)
Dept: RADIOLOGY | Facility: AMBULATORY SURGERY CENTER | Age: 70
End: 2025-08-28
Attending: ANESTHESIOLOGY
Payer: COMMERCIAL

## 2025-08-28 DIAGNOSIS — R52 PAIN: ICD-10-CM

## (undated) DEVICE — GLOVE PROTEXIS POWDER FREE SMT 7.0  2D72PT70X

## (undated) DEVICE — GOWN XLG DISP 9545

## (undated) DEVICE — SUCTION IRR STRYKERFLOW II W/TIP 250-070-520

## (undated) DEVICE — PREP CHLORAPREP W/ORANGE TINT 10.5ML 260715

## (undated) DEVICE — DECANTER VIAL 2006S

## (undated) DEVICE — DRAIN JACKSON PRATT 15FR ROUND SIL LF JP-2229

## (undated) DEVICE — GLOVE ESTEEM POWDER FREE SMT 7.0  2D72PT70

## (undated) DEVICE — ENDO POUCH GOLD 10MM ECATCH 173050G

## (undated) DEVICE — GLOVE PROTEXIS POWDER FREE SMT 7.5  2D72PT75X

## (undated) DEVICE — PREP CHLORAPREP 26ML TINTED ORANGE  260815

## (undated) DEVICE — SURGICEL HEMOSTAT 3X4" NUKNIT 1943

## (undated) DEVICE — SYR 10ML LL W/O NDL

## (undated) DEVICE — BAG CLEAR TRASH 1.3M 39X33" P4040C

## (undated) DEVICE — ESU ELEC BLADE 2.75" COATED/INSULATED E1455

## (undated) DEVICE — TRAY PAIN INJECTION 97A 640

## (undated) DEVICE — TUBING SUCTION 12"X1/4" N612

## (undated) DEVICE — PREP POVIDONE IODINE SOLUTION 10% 120ML

## (undated) DEVICE — SOL NACL 0.9% IRRIG 3000ML BAG 2B7477

## (undated) DEVICE — GLOVE PROTEXIS POWDER FREE SMT 8.0  2D72PT80X

## (undated) DEVICE — Device

## (undated) DEVICE — SUCTION CANISTER MEDIVAC LINER 3000ML W/LID 65651-530

## (undated) DEVICE — SU VICRYL 4-0 P-3 18" UND J494G

## (undated) DEVICE — SOL NACL 0.9% IRRIG 1000ML BOTTLE 2F7124

## (undated) DEVICE — SU ETHILON 2-0 PS 18" 585H

## (undated) DEVICE — ESU PENCIL W/HOLSTER E2350H

## (undated) DEVICE — GLOVE PROTEXIS POWDER FREE 7.5 ORTHOPEDIC 2D73ET75

## (undated) DEVICE — GLOVE PROTEXIS POWDER FREE 8.0 ORTHOPEDIC 2D73ET80

## (undated) DEVICE — GLOVE ESTEEM POWDER FREE SMT 6.5  2D72PT65

## (undated) DEVICE — PREP SKIN SCRUB TRAY 4461A

## (undated) DEVICE — GLOVE PROTEXIS BLUE W/NEU-THERA 8.5  2D73EB85

## (undated) DEVICE — LINEN FULL SHEET 5511

## (undated) DEVICE — LINEN POUCH DBL 5427

## (undated) DEVICE — ENDO CANNULA 05MM VERSAONE UNIVERSAL UNVCA5STF

## (undated) DEVICE — SYR 07ML EPIDURAL LOSS OF RESISTANCE PULSATOR 4905

## (undated) DEVICE — ESU CORD MONOPOLAR 10'  E0510

## (undated) DEVICE — PREP POVIDONE IODINE SCRUB 7.5% 120ML

## (undated) DEVICE — NDL 27GA 1.25" 305136

## (undated) DEVICE — LINEN TOWEL PACK X5 5464

## (undated) DEVICE — SYR 50ML LL W/O NDL 309653

## (undated) DEVICE — LINEN TOWEL PACK X10 5473

## (undated) DEVICE — SUCTION TIP YANKAUER W/O VENT K86

## (undated) DEVICE — PACK MINOR CUSTOM RIDGES SBA32RMRMA

## (undated) DEVICE — GLOVE PROTEXIS BLUE W/NEU-THERA 7.0  2D73EB70

## (undated) DEVICE — SU VICRYL 0 CT-2 CR 8X18" J727D

## (undated) DEVICE — LINEN HALF SHEET 5512

## (undated) DEVICE — GLOVE PROTEXIS POWDER FREE 7.0 ORTHOPEDIC 2D73ET70

## (undated) DEVICE — ESU GROUND PAD ADULT W/CORD E7507

## (undated) DEVICE — TRAY EPIDURAL NDL TUOHY PERIFIX 18GA 3.5" W/CATH 332200

## (undated) DEVICE — DRAPE LAP W/ARMBOARD 29410

## (undated) DEVICE — SUCTION CANISTER STRAW 65652-008

## (undated) DEVICE — GLOVE PROTEXIS POWDER FREE 6.5 ORTHOPEDIC 2D73ET65

## (undated) DEVICE — ENDO TROCAR BLUNT 100MM W/THRD ANCHOR BLUNTPORT BPT12STS

## (undated) DEVICE — ENDO TROCAR 05MM VERSAONE BLADELESS W/STD FIX CAN NONB5STF

## (undated) DEVICE — GLOVE BIOGEL PI ULTRATOUCH G SZ 7.0 42170

## (undated) DEVICE — DRAIN JACKSON PRATT RESERVOIR 100ML SU130-1305

## (undated) RX ORDER — NEOSTIGMINE METHYLSULFATE 1 MG/ML
VIAL (ML) INJECTION
Status: DISPENSED
Start: 2018-02-15

## (undated) RX ORDER — DEXAMETHASONE SODIUM PHOSPHATE 4 MG/ML
INJECTION, SOLUTION INTRA-ARTICULAR; INTRALESIONAL; INTRAMUSCULAR; INTRAVENOUS; SOFT TISSUE
Status: DISPENSED
Start: 2018-02-15

## (undated) RX ORDER — CEFAZOLIN SODIUM 2 G/100ML
INJECTION, SOLUTION INTRAVENOUS
Status: DISPENSED
Start: 2018-02-15

## (undated) RX ORDER — LIDOCAINE HYDROCHLORIDE 10 MG/ML
INJECTION, SOLUTION EPIDURAL; INFILTRATION; INTRACAUDAL; PERINEURAL
Status: DISPENSED
Start: 2017-04-15

## (undated) RX ORDER — PHENYLEPHRINE HCL IN 0.9% NACL 1 MG/10 ML
SYRINGE (ML) INTRAVENOUS
Status: DISPENSED
Start: 2018-02-15

## (undated) RX ORDER — PROPOFOL 10 MG/ML
INJECTION, EMULSION INTRAVENOUS
Status: DISPENSED
Start: 2018-02-15

## (undated) RX ORDER — LIDOCAINE HYDROCHLORIDE 10 MG/ML
INJECTION, SOLUTION EPIDURAL; INFILTRATION; INTRACAUDAL; PERINEURAL
Status: DISPENSED
Start: 2018-02-15

## (undated) RX ORDER — HYDROMORPHONE HYDROCHLORIDE 1 MG/ML
INJECTION, SOLUTION INTRAMUSCULAR; INTRAVENOUS; SUBCUTANEOUS
Status: DISPENSED
Start: 2018-02-15

## (undated) RX ORDER — GLYCOPYRROLATE 0.2 MG/ML
INJECTION INTRAMUSCULAR; INTRAVENOUS
Status: DISPENSED
Start: 2018-02-15

## (undated) RX ORDER — BUPIVACAINE HYDROCHLORIDE AND EPINEPHRINE 5; 5 MG/ML; UG/ML
INJECTION, SOLUTION EPIDURAL; INTRACAUDAL; PERINEURAL
Status: DISPENSED
Start: 2018-02-15

## (undated) RX ORDER — DIAZEPAM 5 MG
TABLET ORAL
Status: DISPENSED
Start: 2022-09-01

## (undated) RX ORDER — FENTANYL CITRATE 50 UG/ML
INJECTION, SOLUTION INTRAMUSCULAR; INTRAVENOUS
Status: DISPENSED
Start: 2018-02-15

## (undated) RX ORDER — ONDANSETRON 2 MG/ML
INJECTION INTRAMUSCULAR; INTRAVENOUS
Status: DISPENSED
Start: 2018-02-15